# Patient Record
Sex: FEMALE | Race: WHITE | NOT HISPANIC OR LATINO | Employment: OTHER | URBAN - METROPOLITAN AREA
[De-identification: names, ages, dates, MRNs, and addresses within clinical notes are randomized per-mention and may not be internally consistent; named-entity substitution may affect disease eponyms.]

---

## 2019-12-05 ENCOUNTER — OFFICE VISIT (OUTPATIENT)
Dept: FAMILY MEDICINE | Facility: CLINIC | Age: 74
End: 2019-12-05
Payer: COMMERCIAL

## 2019-12-05 VITALS
TEMPERATURE: 98 F | SYSTOLIC BLOOD PRESSURE: 130 MMHG | OXYGEN SATURATION: 98 % | DIASTOLIC BLOOD PRESSURE: 86 MMHG | HEART RATE: 52 BPM | HEIGHT: 67 IN | WEIGHT: 147.81 LBS | BODY MASS INDEX: 23.2 KG/M2

## 2019-12-05 DIAGNOSIS — Z13.820 SCREENING FOR OSTEOPOROSIS: ICD-10-CM

## 2019-12-05 DIAGNOSIS — T84.012D FAILED TOTAL RIGHT KNEE REPLACEMENT, SUBSEQUENT ENCOUNTER: ICD-10-CM

## 2019-12-05 DIAGNOSIS — Z23 IMMUNIZATION DUE: Primary | ICD-10-CM

## 2019-12-05 DIAGNOSIS — R73.9 HYPERGLYCEMIA: ICD-10-CM

## 2019-12-05 DIAGNOSIS — I71.40 ABDOMINAL AORTIC ANEURYSM (AAA) WITHOUT RUPTURE: ICD-10-CM

## 2019-12-05 DIAGNOSIS — F02.80 DEMENTIA ASSOCIATED WITH OTHER UNDERLYING DISEASE WITHOUT BEHAVIORAL DISTURBANCE: ICD-10-CM

## 2019-12-05 DIAGNOSIS — I63.322 CEREBROVASCULAR ACCIDENT (CVA) DUE TO THROMBOSIS OF LEFT ANTERIOR CEREBRAL ARTERY: ICD-10-CM

## 2019-12-05 PROBLEM — T84.012A FAILED TOTAL RIGHT KNEE REPLACEMENT: Status: ACTIVE | Noted: 2019-12-05

## 2019-12-05 PROBLEM — I63.9 STROKE: Status: ACTIVE | Noted: 2019-12-05

## 2019-12-05 PROBLEM — F03.90 DEMENTIA: Status: ACTIVE | Noted: 2019-12-05

## 2019-12-05 PROCEDURE — 1170F FXNL STATUS ASSESSED: CPT | Mod: S$GLB,,, | Performed by: FAMILY MEDICINE

## 2019-12-05 PROCEDURE — 1126F PR PAIN SEVERITY QUANTIFIED, NO PAIN PRESENT: ICD-10-PCS | Mod: S$GLB,,, | Performed by: FAMILY MEDICINE

## 2019-12-05 PROCEDURE — 1170F PR FUNCTIONAL STATUS ASSESSED: ICD-10-PCS | Mod: S$GLB,,, | Performed by: FAMILY MEDICINE

## 2019-12-05 PROCEDURE — 1159F PR MEDICATION LIST DOCUMENTED IN MEDICAL RECORD: ICD-10-PCS | Mod: S$GLB,,, | Performed by: FAMILY MEDICINE

## 2019-12-05 PROCEDURE — 99204 PR OFFICE/OUTPT VISIT, NEW, LEVL IV, 45-59 MIN: ICD-10-PCS | Mod: S$GLB,,, | Performed by: FAMILY MEDICINE

## 2019-12-05 PROCEDURE — 1126F AMNT PAIN NOTED NONE PRSNT: CPT | Mod: S$GLB,,, | Performed by: FAMILY MEDICINE

## 2019-12-05 PROCEDURE — 1159F MED LIST DOCD IN RCRD: CPT | Mod: S$GLB,,, | Performed by: FAMILY MEDICINE

## 2019-12-05 PROCEDURE — 99204 OFFICE O/P NEW MOD 45 MIN: CPT | Mod: S$GLB,,, | Performed by: FAMILY MEDICINE

## 2019-12-05 RX ORDER — PANTOPRAZOLE SODIUM 40 MG/1
TABLET, DELAYED RELEASE ORAL
Refills: 2 | COMMUNITY
Start: 2019-11-25 | End: 2020-05-21

## 2019-12-05 RX ORDER — LOSARTAN POTASSIUM 25 MG/1
TABLET ORAL
Refills: 5 | COMMUNITY
Start: 2019-10-07 | End: 2020-04-13 | Stop reason: SDUPTHER

## 2019-12-05 RX ORDER — DONEPEZIL HYDROCHLORIDE 10 MG/1
TABLET, FILM COATED ORAL
Refills: 1 | COMMUNITY
Start: 2019-10-24 | End: 2020-09-09 | Stop reason: SDUPTHER

## 2019-12-05 RX ORDER — LAMOTRIGINE 150 MG/1
100 TABLET ORAL
Refills: 3 | COMMUNITY
Start: 2019-11-04 | End: 2020-09-25

## 2019-12-05 RX ORDER — CLOPIDOGREL BISULFATE 75 MG/1
TABLET ORAL
Refills: 2 | COMMUNITY
Start: 2019-10-28 | End: 2020-05-06 | Stop reason: SDUPTHER

## 2019-12-05 RX ORDER — LEVOTHYROXINE SODIUM 75 UG/1
TABLET ORAL
Refills: 3 | COMMUNITY
Start: 2019-11-01 | End: 2020-04-29

## 2019-12-05 RX ORDER — LOSARTAN POTASSIUM 50 MG/1
TABLET ORAL
Refills: 1 | COMMUNITY
Start: 2019-11-18 | End: 2020-05-17

## 2019-12-05 NOTE — PROGRESS NOTES
Subjective:       Patient ID: Alondra Pacheco is a 74 y.o. female.    Chief Complaint: Establish Care (patient of Dr. Mejia )      HPI    Allergies and Medications:   Review of patient's allergies indicates:  No Known Allergies  Current Outpatient Medications   Medication Sig Dispense Refill    atorvastatin (LIPITOR) 40 MG tablet TAKE 1 TABLET BY MOUTH ONCE DAILY 90 tablet 0    clopidogrel (PLAVIX) 75 mg tablet TK 1 T PO QD  2    donepezil (ARICEPT) 10 MG tablet TK 1 T PO QD  1    fenofibrate micronized (ANTARA) 130 MG capsule Take 130 mg by mouth before breakfast.      lamoTRIgine (LAMICTAL) 150 MG Tab TK 1 T PO BID  3    levothyroxine (SYNTHROID) 75 MCG tablet TK 1 T PO  D  3    losartan (COZAAR) 50 MG tablet TK 1 T PO QD  1    metoprolol tartrate (LOPRESSOR) 25 MG tablet TAKE 1 TABLET BY MOUTH TWICE DAILY 60 tablet 0    multivitamin capsule Take 1 capsule by mouth once daily.      pantoprazole (PROTONIX) 40 MG tablet TK 1 T PO  D  2    alendronate (FOSAMAX) 70 MG tablet Take 70 mg by mouth every 7 days.      amitriptyline (ELAVIL) 25 MG tablet Take 25 mg by mouth nightly as needed.      aspirin (ECOTRIN) 81 MG EC tablet Take 81 mg by mouth once daily.      co-enzyme Q-10 30 mg capsule Take 30 mg by mouth 3 (three) times daily.      fish oil-omega-3 fatty acids 300-1,000 mg capsule Take 2 g by mouth once daily.      losartan (COZAAR) 25 MG tablet TK 1 T PO QD  5    magnesium oxide (MAG-OX) 400 mg tablet Take 400 mg by mouth once daily.      metoprolol succinate (TOPROL-XL) 50 MG 24 hr tablet Take 50 mg by mouth once daily.      metoprolol tartrate (LOPRESSOR) 50 MG tablet TAKE 1 TABLET BY MOUTH TWICE DAILY (Patient not taking: Reported on 12/5/2019) 180 tablet 0    tetanus and diphther. tox, PF, (TENIVAC, PF,) 5-2 Lf unit/0.5 mL Syrg Inject 0.5 mLs into the muscle once. for 1 dose 0.5 mL 0    topiramate (TOPAMAX) 25 MG tablet TAKE 1 TABLET BY MOUTH AT BEDTIME FOR 7 DAYS THEN 1 TWICE DAILY  (Patient not taking: Reported on 2019) 180 tablet 0    varicella-zoster gE-AS01B, PF, (SHINGRIX, PF,) 50 mcg/0.5 mL injection Inject 0.5 mLs into the muscle once. for 1 dose 0.5 mL 0     No current facility-administered medications for this visit.        Family History:   Family History   Problem Relation Age of Onset    Cancer Father     Diabetes Sister     Heart attack Paternal Uncle        Social History:   Social History     Socioeconomic History    Marital status:      Spouse name: Not on file    Number of children: Not on file    Years of education: Not on file    Highest education level: Not on file   Occupational History    Not on file   Social Needs    Financial resource strain: Not on file    Food insecurity:     Worry: Not on file     Inability: Not on file    Transportation needs:     Medical: Not on file     Non-medical: Not on file   Tobacco Use    Smoking status: Former Smoker     Packs/day: 1.50     Years: 12.00     Pack years: 18.00     Last attempt to quit: 1975     Years since quittin.8    Smokeless tobacco: Never Used   Substance and Sexual Activity    Alcohol use: No    Drug use: No    Sexual activity: Not Currently   Lifestyle    Physical activity:     Days per week: Not on file     Minutes per session: Not on file    Stress: Not at all   Relationships    Social connections:     Talks on phone: Not on file     Gets together: Not on file     Attends Religion service: Not on file     Active member of club or organization: Not on file     Attends meetings of clubs or organizations: Not on file     Relationship status: Not on file   Other Topics Concern    Not on file   Social History Narrative    Not on file       Review of Systems   Respiratory:        Was a heavy smoker for 15 years(45 pack year)   Neurological:        Ocular CVA left eye in 2018, has had seizures up until February when she was found to have high blood sugar and has not had 1  since       Objective:     Vitals:    12/05/19 1414   BP: 130/86   Pulse: (!) 52   Temp: 98 °F (36.7 °C)        Physical Exam   Constitutional: She appears well-developed and well-nourished.   HENT:   Head: Normocephalic and atraumatic.   Eyes: Pupils are equal, round, and reactive to light.   Cardiovascular: Normal rate, regular rhythm, normal heart sounds and intact distal pulses. Exam reveals no gallop and no friction rub.   No murmur heard.  Pulmonary/Chest: Effort normal and breath sounds normal. No stridor. No respiratory distress. She has no wheezes. She has no rales. She exhibits no tenderness.   Psychiatric: She has a normal mood and affect. Her behavior is normal. Judgment and thought content normal.   Nursing note and vitals reviewed.      Assessment:       1. Immunization due    2. Cerebrovascular accident (CVA) due to thrombosis of left anterior cerebral artery    3. Failed total right knee replacement, subsequent encounter    4. Dementia associated with other underlying disease without behavioral disturbance    5. Abdominal aortic aneurysm (AAA) without rupture    6. Hyperglycemia    7. Screening for osteoporosis        Plan:       Alondra was seen today for establish care.    Diagnoses and all orders for this visit:    Immunization due  -     varicella-zoster gE-AS01B, PF, (SHINGRIX, PF,) 50 mcg/0.5 mL injection; Inject 0.5 mLs into the muscle once. for 1 dose  -     tetanus and diphther. tox, PF, (TENIVAC, PF,) 5-2 Lf unit/0.5 mL Syrg; Inject 0.5 mLs into the muscle once. for 1 dose    Cerebrovascular accident (CVA) due to thrombosis of left anterior cerebral artery  -     Comprehensive metabolic panel; Future    Failed total right knee replacement, subsequent encounter    Dementia associated with other underlying disease without behavioral disturbance    Abdominal aortic aneurysm (AAA) without rupture  -     US Abdominal Aorta; Future    Hyperglycemia  -     Hemoglobin A1c; Future    Screening for  osteoporosis  -     DXA Bone Density Spine And Hip         Follow up in about 3 months (around 3/5/2020).

## 2019-12-06 ENCOUNTER — TELEPHONE (OUTPATIENT)
Dept: FAMILY MEDICINE | Facility: CLINIC | Age: 74
End: 2019-12-06

## 2019-12-06 DIAGNOSIS — Z78.0 POSTMENOPAUSAL STATUS (AGE-RELATED) (NATURAL): Primary | ICD-10-CM

## 2019-12-06 NOTE — TELEPHONE ENCOUNTER
Please sign updated order for Dexa Scan with a code that is covered.  Order is pended with correct code

## 2019-12-06 NOTE — TELEPHONE ENCOUNTER
----- Message from Tammi Rodriguez sent at 12/6/2019 10:40 AM CST -----  Regarding: NON-PAYABLE DX CODE  PLEASE UPDATE DIAGNOSIS CODE ON THE DEXA ORDER.  Z13.820 ALONE IS NOT PAYABLE.    THANKS,

## 2019-12-07 ENCOUNTER — PATIENT MESSAGE (OUTPATIENT)
Dept: FAMILY MEDICINE | Facility: CLINIC | Age: 74
End: 2019-12-07

## 2019-12-15 ENCOUNTER — PATIENT MESSAGE (OUTPATIENT)
Dept: FAMILY MEDICINE | Facility: CLINIC | Age: 74
End: 2019-12-15

## 2019-12-15 DIAGNOSIS — Z23 IMMUNIZATION DUE: Primary | ICD-10-CM

## 2019-12-18 ENCOUNTER — HOSPITAL ENCOUNTER (OUTPATIENT)
Dept: RADIOLOGY | Facility: HOSPITAL | Age: 74
Discharge: HOME | End: 2019-12-18
Attending: FAMILY MEDICINE
Payer: COMMERCIAL

## 2019-12-18 DIAGNOSIS — Z78.0 POSTMENOPAUSAL STATUS (AGE-RELATED) (NATURAL): ICD-10-CM

## 2019-12-18 DIAGNOSIS — I71.40 ABDOMINAL AORTIC ANEURYSM (AAA) WITHOUT RUPTURE: ICD-10-CM

## 2019-12-18 PROCEDURE — 77080 DXA BONE DENSITY AXIAL: CPT | Mod: TC,PO

## 2019-12-18 PROCEDURE — 76775 US EXAM ABDO BACK WALL LIM: CPT | Mod: TC,PO

## 2019-12-19 ENCOUNTER — TELEPHONE (OUTPATIENT)
Dept: FAMILY MEDICINE | Facility: CLINIC | Age: 74
End: 2019-12-19

## 2019-12-19 NOTE — TELEPHONE ENCOUNTER
----- Message from Jak Park MD sent at 12/18/2019 11:32 AM CST -----  Continued osteoporosis especially at the hip.  Lumbar spine is improved continue but Fosamax weekly and calcium and vitamin-D daily, repeat scan in 2 years.

## 2020-03-12 ENCOUNTER — OFFICE VISIT (OUTPATIENT)
Dept: FAMILY MEDICINE | Facility: CLINIC | Age: 75
End: 2020-03-12
Payer: COMMERCIAL

## 2020-03-12 VITALS
DIASTOLIC BLOOD PRESSURE: 62 MMHG | HEART RATE: 60 BPM | BODY MASS INDEX: 23.23 KG/M2 | SYSTOLIC BLOOD PRESSURE: 134 MMHG | HEIGHT: 67 IN | OXYGEN SATURATION: 98 % | WEIGHT: 148 LBS

## 2020-03-12 DIAGNOSIS — E78.2 MIXED HYPERLIPIDEMIA: Primary | ICD-10-CM

## 2020-03-12 DIAGNOSIS — I63.322 CEREBROVASCULAR ACCIDENT (CVA) DUE TO THROMBOSIS OF LEFT ANTERIOR CEREBRAL ARTERY: ICD-10-CM

## 2020-03-12 DIAGNOSIS — E11.8 DIABETES MELLITUS WITH COMPLICATION IN ADULT PATIENT: ICD-10-CM

## 2020-03-12 PROCEDURE — 99213 OFFICE O/P EST LOW 20 MIN: CPT | Mod: S$GLB,,, | Performed by: FAMILY MEDICINE

## 2020-03-12 PROCEDURE — 99213 PR OFFICE/OUTPT VISIT, EST, LEVL III, 20-29 MIN: ICD-10-PCS | Mod: S$GLB,,, | Performed by: FAMILY MEDICINE

## 2020-03-12 NOTE — PROGRESS NOTES
Subjective:       Patient ID: Alondra Pacheco is a 74 y.o. female.    Chief Complaint: Results      Patient is here follow-up from last visit she had a stroke a year and half ago and was last seen in December 19th of last year.  Lab Results       Component                Value               Date                       WBC                      5.6                 02/18/2020                 HGB                      11.6 (L)            02/18/2020                 HCT                      37.1                02/18/2020                 PLT                      336                 02/18/2020                 CHOL                     150                 02/18/2020                 TRIG                     101                 02/18/2020                 HDL                      46 (L)              02/18/2020                 ALT                      16                  02/18/2020                 AST                      20                  02/18/2020                 NA                       142                 02/18/2020                 K                        4.1                 02/18/2020                 CL                       109                 02/18/2020                 CREATININE               1.31 (H)            02/18/2020                 BUN                      30 (H)              02/18/2020                 CO2                      25                  02/18/2020                 TSH                      3.93                05/02/2019            Her only residual is some some loss of peripheral vision on the left side and loss of taste on the left side    Flank Pain   This is a chronic problem. The problem occurs constantly. The pain is present in the costovertebral angle. The pain is at a severity of 4/10.       Allergies and Medications:   Review of patient's allergies indicates:  No Known Allergies  Current Outpatient Medications   Medication Sig Dispense Refill    alendronate (FOSAMAX) 70 MG tablet Take 70 mg by  mouth every 7 days.      aspirin (ECOTRIN) 81 MG EC tablet Take 81 mg by mouth once daily.      atorvastatin (LIPITOR) 80 MG tablet TAKE 1 TABLET BY MOUTH EVERY DAY 90 tablet 0    clopidogrel (PLAVIX) 75 mg tablet TK 1 T PO QD  2    co-enzyme Q-10 30 mg capsule Take 30 mg by mouth 3 (three) times daily.      donepezil (ARICEPT) 10 MG tablet TK 1 T PO QD  1    fenofibrate micronized (ANTARA) 130 MG capsule Take 130 mg by mouth before breakfast.      lamoTRIgine (LAMICTAL) 150 MG Tab TK 1 T PO BID  3    levothyroxine (SYNTHROID) 75 MCG tablet TK 1 T PO  D  3    losartan (COZAAR) 50 MG tablet TK 1 T PO QD  1    metoprolol succinate (TOPROL-XL) 50 MG 24 hr tablet Take 50 mg by mouth once daily.      multivitamin capsule Take 1 capsule by mouth once daily.      pantoprazole (PROTONIX) 40 MG tablet TK 1 T PO  D  2    amitriptyline (ELAVIL) 25 MG tablet Take 25 mg by mouth nightly as needed.      atorvastatin (LIPITOR) 40 MG tablet TAKE 1 TABLET BY MOUTH ONCE DAILY (Patient not taking: Reported on 3/12/2020) 90 tablet 0    fish oil-omega-3 fatty acids 300-1,000 mg capsule Take 2 g by mouth once daily.      losartan (COZAAR) 25 MG tablet TK 1 T PO QD  5    magnesium oxide (MAG-OX) 400 mg tablet Take 400 mg by mouth once daily.      metoprolol tartrate (LOPRESSOR) 25 MG tablet TAKE 1 TABLET BY MOUTH TWICE DAILY (Patient not taking: Reported on 3/12/2020) 60 tablet 0    metoprolol tartrate (LOPRESSOR) 50 MG tablet TAKE 1 TABLET BY MOUTH TWICE DAILY (Patient not taking: Reported on 12/5/2019) 180 tablet 0    topiramate (TOPAMAX) 25 MG tablet TAKE 1 TABLET BY MOUTH AT BEDTIME FOR 7 DAYS THEN 1 TWICE DAILY (Patient not taking: Reported on 12/5/2019) 180 tablet 0     No current facility-administered medications for this visit.        Family History:   Family History   Problem Relation Age of Onset    Cancer Father     Diabetes Sister     Heart attack Paternal Uncle        Social History:   Social History      Socioeconomic History    Marital status:      Spouse name: Not on file    Number of children: Not on file    Years of education: Not on file    Highest education level: Not on file   Occupational History    Not on file   Social Needs    Financial resource strain: Not on file    Food insecurity:     Worry: Not on file     Inability: Not on file    Transportation needs:     Medical: Not on file     Non-medical: Not on file   Tobacco Use    Smoking status: Former Smoker     Packs/day: 1.50     Years: 12.00     Pack years: 18.00     Last attempt to quit: 1975     Years since quittin.1    Smokeless tobacco: Never Used   Substance and Sexual Activity    Alcohol use: No    Drug use: No    Sexual activity: Not Currently   Lifestyle    Physical activity:     Days per week: Not on file     Minutes per session: Not on file    Stress: Not at all   Relationships    Social connections:     Talks on phone: Not on file     Gets together: Not on file     Attends [employer] service: Not on file     Active member of club or organization: Not on file     Attends meetings of clubs or organizations: Not on file     Relationship status: Not on file   Other Topics Concern    Not on file   Social History Narrative    Not on file       Review of Systems   Genitourinary: Negative for flank pain.   Neurological: Positive for seizures ( Resolved).       Objective:     Vitals:    20 1615   BP: 134/62   Pulse: 60        Physical Exam   Constitutional: She is oriented to person, place, and time. She appears well-developed and well-nourished.   HENT:   Head: Normocephalic and atraumatic.   Eyes: Pupils are equal, round, and reactive to light.   Cardiovascular: Normal rate, regular rhythm, normal heart sounds and intact distal pulses. Exam reveals no gallop and no friction rub.   No murmur heard.  Pulmonary/Chest: Effort normal and breath sounds normal. No stridor. No respiratory distress. She has no  wheezes. She has no rales. She exhibits no tenderness.   Abdominal: Soft. Bowel sounds are normal.   Neurological: She is alert and oriented to person, place, and time. She displays normal reflexes. No cranial nerve deficit or sensory deficit. She exhibits normal muscle tone. Coordination normal.   Skin: Skin is warm and dry.   Psychiatric: She has a normal mood and affect. Her behavior is normal. Judgment and thought content normal.   Nursing note and vitals reviewed.      Assessment:       1. Mixed hyperlipidemia    2. Cerebrovascular accident (CVA) due to thrombosis of left anterior cerebral artery    3. Diabetes mellitus with complication in adult patient        Plan:       Alondra was seen today for results.    Diagnoses and all orders for this visit:    Mixed hyperlipidemia    Cerebrovascular accident (CVA) due to thrombosis of left anterior cerebral artery    Diabetes mellitus with complication in adult patient  -     Hemoglobin A1c; Future         Follow up in about 3 months (around 6/12/2020).

## 2020-04-13 ENCOUNTER — PATIENT MESSAGE (OUTPATIENT)
Dept: FAMILY MEDICINE | Facility: CLINIC | Age: 75
End: 2020-04-13

## 2020-04-13 ENCOUNTER — TELEPHONE (OUTPATIENT)
Dept: FAMILY MEDICINE | Facility: CLINIC | Age: 75
End: 2020-04-13

## 2020-04-13 PROBLEM — E78.5 HYPERLIPIDEMIA: Status: ACTIVE | Noted: 2020-04-13

## 2020-04-13 PROBLEM — I10 HYPERTENSION, ESSENTIAL: Status: ACTIVE | Noted: 2020-04-13

## 2020-04-13 PROBLEM — N18.30 STAGE 3 CHRONIC KIDNEY DISEASE: Status: ACTIVE | Noted: 2020-04-13

## 2020-04-13 PROBLEM — I12.9 HYPERTENSIVE KIDNEY DISEASE: Status: ACTIVE | Noted: 2020-04-13

## 2020-04-24 ENCOUNTER — HOSPITAL ENCOUNTER (OUTPATIENT)
Dept: RADIOLOGY | Facility: HOSPITAL | Age: 75
Discharge: HOME | End: 2020-04-24
Attending: FAMILY MEDICINE
Payer: COMMERCIAL

## 2020-04-24 DIAGNOSIS — R10.32 LEFT LOWER QUADRANT PAIN: Primary | ICD-10-CM

## 2020-04-24 DIAGNOSIS — R10.32 LEFT LOWER QUADRANT PAIN: ICD-10-CM

## 2020-04-24 PROBLEM — G40.909 SEIZURE DISORDER: Status: ACTIVE | Noted: 2020-04-24

## 2020-04-24 PROCEDURE — 74176 CT ABD & PELVIS W/O CONTRAST: CPT | Mod: TC,PO

## 2020-05-06 PROBLEM — G45.9 TIA (TRANSIENT ISCHEMIC ATTACK): Status: ACTIVE | Noted: 2020-05-06

## 2020-06-11 DIAGNOSIS — I63.9 OCCIPITAL STROKE: Primary | ICD-10-CM

## 2020-06-16 DIAGNOSIS — R26.81 GAIT INSTABILITY: Primary | ICD-10-CM

## 2020-06-16 DIAGNOSIS — G43.009: ICD-10-CM

## 2020-06-17 ENCOUNTER — TELEPHONE (OUTPATIENT)
Dept: FAMILY MEDICINE | Facility: CLINIC | Age: 75
End: 2020-06-17

## 2020-06-17 DIAGNOSIS — E11.8 DIABETES MELLITUS WITH COMPLICATION IN ADULT PATIENT: Primary | ICD-10-CM

## 2020-06-17 NOTE — TELEPHONE ENCOUNTER
The patient's son called requesting a new lab order. He stated that the A1c that was ordered has . He is wanting to know if it was possible to get a new order so she can get it completed prior to her upcoming appointment. I have pended the order for approval. So please review before approving.

## 2020-06-18 ENCOUNTER — HOSPITAL ENCOUNTER (OUTPATIENT)
Dept: RADIOLOGY | Facility: HOSPITAL | Age: 75
Discharge: HOME | End: 2020-06-18
Attending: NURSE PRACTITIONER
Payer: COMMERCIAL

## 2020-06-18 DIAGNOSIS — I63.9 OCCIPITAL STROKE: ICD-10-CM

## 2020-06-18 PROCEDURE — 70551 MRI BRAIN STEM W/O DYE: CPT | Mod: TC,PO

## 2020-06-18 NOTE — TELEPHONE ENCOUNTER
Patient's son stated that they were able to get the labwork completed. He stated please ignore his phone call.

## 2020-06-19 LAB — HBA1C MFR BLD: 5.5 % OF TOTAL HGB

## 2020-06-22 ENCOUNTER — TELEPHONE (OUTPATIENT)
Dept: FAMILY MEDICINE | Facility: CLINIC | Age: 75
End: 2020-06-22

## 2020-06-22 NOTE — TELEPHONE ENCOUNTER
----- Message from KAIDEN Chen sent at 6/19/2020 12:32 PM CDT -----  Please call patient. HgbA1c looks good at 5.5. Keep appointment as scheduled.

## 2020-06-25 ENCOUNTER — OFFICE VISIT (OUTPATIENT)
Dept: FAMILY MEDICINE | Facility: CLINIC | Age: 75
End: 2020-06-25
Payer: COMMERCIAL

## 2020-06-25 VITALS
BODY MASS INDEX: 23.67 KG/M2 | TEMPERATURE: 98 F | OXYGEN SATURATION: 98 % | SYSTOLIC BLOOD PRESSURE: 130 MMHG | RESPIRATION RATE: 18 BRPM | HEIGHT: 66 IN | HEART RATE: 90 BPM | WEIGHT: 147.31 LBS | DIASTOLIC BLOOD PRESSURE: 60 MMHG

## 2020-06-25 DIAGNOSIS — N39.0 URINARY TRACT INFECTION WITH HEMATURIA, SITE UNSPECIFIED: Primary | ICD-10-CM

## 2020-06-25 DIAGNOSIS — R31.9 URINARY TRACT INFECTION WITH HEMATURIA, SITE UNSPECIFIED: Primary | ICD-10-CM

## 2020-06-25 DIAGNOSIS — B35.6 TINEA CRURIS: ICD-10-CM

## 2020-06-25 LAB
BILIRUB UR QL STRIP: NEGATIVE
GLUCOSE UR QL STRIP: NEGATIVE
KETONES UR QL STRIP: NEGATIVE
LEUKOCYTE ESTERASE UR QL STRIP: NEGATIVE
PH, POC UA: 6
POC BLOOD, URINE: NEGATIVE
POC NITRATES, URINE: NEGATIVE
PROT UR QL STRIP: NEGATIVE
SP GR UR STRIP: 1.01 (ref 1–1.03)
UROBILINOGEN UR STRIP-ACNC: NORMAL (ref 0.1–1.1)

## 2020-06-25 PROCEDURE — 99214 OFFICE O/P EST MOD 30 MIN: CPT | Mod: 25,S$GLB,, | Performed by: FAMILY MEDICINE

## 2020-06-25 PROCEDURE — 99214 PR OFFICE/OUTPT VISIT, EST, LEVL IV, 30-39 MIN: ICD-10-PCS | Mod: 25,S$GLB,, | Performed by: FAMILY MEDICINE

## 2020-06-25 PROCEDURE — 81003 URINALYSIS AUTO W/O SCOPE: CPT | Mod: QW,S$GLB,, | Performed by: FAMILY MEDICINE

## 2020-06-25 PROCEDURE — 81003 POCT URINALYSIS, DIPSTICK, AUTOMATED, W/O SCOPE: ICD-10-PCS | Mod: QW,S$GLB,, | Performed by: FAMILY MEDICINE

## 2020-06-25 RX ORDER — NYSTATIN AND TRIAMCINOLONE ACETONIDE 100000; 1 [USP'U]/G; MG/G
OINTMENT TOPICAL 2 TIMES DAILY
Qty: 60 G | Refills: 1 | Status: SHIPPED | OUTPATIENT
Start: 2020-06-25 | End: 2020-07-01 | Stop reason: SDUPTHER

## 2020-06-25 NOTE — PROGRESS NOTES
Subjective:       Patient ID: Alondra Pacheco is a 74 y.o. female.    Chief Complaint: Hyperlipidemia (follow up labs)      Patient is here for follow-up.  She had seen Dr. Mejia in May of this year with UTI.  She previously had suprapubic pain but no dysuria or odor she says this has improved.  Lab Results       Component                Value               Date                       WBC                      7.0                 06/18/2020                 HGB                      11.8                06/18/2020                 HCT                      36.2                06/18/2020                 PLT                      291                 06/18/2020                 CHOL                     150                 02/18/2020                 TRIG                     101                 02/18/2020                 HDL                      46 (L)              02/18/2020                 ALT                      13                  06/18/2020                 AST                      20                  06/18/2020                 NA                       141                 06/18/2020                 K                        4.0                 06/18/2020                 CL                       109                 06/18/2020                 CREATININE               1.15 (H)            06/18/2020                 BUN                      23                  06/18/2020                 CO2                      23                  06/18/2020                 TSH                      3.93                05/02/2019                 HGBA1C                   5.5                 06/18/2020            Lab Results       Component                Value               Date                       CHOL                     150                 02/18/2020                 CHOL                     163                 07/15/2019            Lab Results       Component                Value               Date                       HDL                       46 (L)              02/18/2020                 HDL                      50 (L)              07/15/2019            Lab Results       Component                Value               Date                       LDLCALC                  85                  02/18/2020                 LDLCALC                  92                  07/15/2019            Lab Results       Component                Value               Date                       TRIG                     101                 02/18/2020                 TRIG                     112                 07/15/2019            Lab Results       Component                Value               Date                       CHOLHDL                  3.3                 02/18/2020                 CHOLHDL                  3.3                 07/15/2019                Hyperlipidemia  This is a chronic problem. The problem is controlled. Recent lipid tests were reviewed and are normal. Pertinent negatives include no chest pain or focal sensory loss.       Allergies and Medications:   Review of patient's allergies indicates:   Allergen Reactions    Anesthesia s/i-40 (propofol) [propofol]      Current Outpatient Medications   Medication Sig Dispense Refill    alendronate (FOSAMAX) 70 MG tablet Take 70 mg by mouth every 7 days.      atorvastatin (LIPITOR) 80 MG tablet TAKE 1 TABLET BY MOUTH EVERY DAY 90 tablet 0    clopidogreL (PLAVIX) 75 mg tablet TK 1 T PO QD 90 tablet 1    donepezil (ARICEPT) 10 MG tablet TK 1 T PO QD  1    fenofibrate 160 MG Tab TAKE 1 TABLET BY MOUTH EVERY DAY 90 tablet 1    lamoTRIgine (LAMICTAL) 150 MG Tab 100 mg.   3    levothyroxine (SYNTHROID) 75 MCG tablet TAKE 1 TABLET BY MOUTH DAILY 90 tablet 1    losartan (COZAAR) 50 MG tablet TAKE 1 TABLET BY MOUTH EVERY DAY 90 tablet 0    metoprolol tartrate (LOPRESSOR) 25 MG tablet TAKE 1 TABLET BY MOUTH TWICE DAILY 180 tablet 0    multivitamin capsule Take 1 capsule by mouth once daily.      pantoprazole  (PROTONIX) 40 MG tablet TAKE 1 TABLET BY MOUTH DAILY 90 tablet 1    topiramate (TOPAMAX) 50 MG tablet Take 1 tablet (50 mg total) by mouth 2 (two) times daily. 180 tablet 1    cefUROXime (CEFTIN) 500 MG tablet Take 1 tablet (500 mg total) by mouth 2 (two) times daily. (Patient not taking: Reported on 2020) 20 tablet 0    ciprofloxacin HCl (CIPRO) 500 MG tablet Take 1 tablet (500 mg total) by mouth 2 (two) times daily. (Patient not taking: Reported on 2020) 10 tablet 0    metroNIDAZOLE (FLAGYL) 250 MG tablet Take 1 tablet (250 mg total) by mouth 3 (three) times daily. (Patient not taking: Reported on 2020) 15 tablet 0    nystatin-triamcinolone (MYCOLOG) ointment Apply topically 2 (two) times daily. for 10 days 60 g 1     No current facility-administered medications for this visit.        Family History:   Family History   Problem Relation Age of Onset    Cancer Father     Diabetes Sister     Heart attack Paternal Uncle        Social History:   Social History     Socioeconomic History    Marital status:      Spouse name: Not on file    Number of children: Not on file    Years of education: Not on file    Highest education level: Not on file   Occupational History    Not on file   Social Needs    Financial resource strain: Not on file    Food insecurity     Worry: Not on file     Inability: Not on file    Transportation needs     Medical: Not on file     Non-medical: Not on file   Tobacco Use    Smoking status: Former Smoker     Packs/day: 1.50     Years: 12.00     Pack years: 18.00     Quit date: 1975     Years since quittin.4    Smokeless tobacco: Never Used   Substance and Sexual Activity    Alcohol use: No    Drug use: No    Sexual activity: Not Currently   Lifestyle    Physical activity     Days per week: Not on file     Minutes per session: Not on file    Stress: Not at all   Relationships    Social connections     Talks on phone: Not on file     Gets  together: Not on file     Attends Sabianist service: Not on file     Active member of club or organization: Not on file     Attends meetings of clubs or organizations: Not on file     Relationship status: Not on file   Other Topics Concern    Not on file   Social History Narrative    Not on file       Review of Systems   Cardiovascular: Negative for chest pain.       Objective:     Vitals:    06/25/20 1530   BP: 130/60   Pulse: 90   Resp: 18   Temp: 97.5 °F (36.4 °C)        Physical Exam  Vitals signs and nursing note reviewed.   Constitutional:       Appearance: She is well-developed and normal weight.   HENT:      Head: Normocephalic and atraumatic.   Eyes:      Pupils: Pupils are equal, round, and reactive to light.   Cardiovascular:      Rate and Rhythm: Normal rate and regular rhythm.      Heart sounds: Normal heart sounds. No murmur. No friction rub. No gallop.    Pulmonary:      Effort: Pulmonary effort is normal. No respiratory distress.      Breath sounds: Normal breath sounds. No stridor. No wheezing or rales.   Chest:      Chest wall: No tenderness.   Genitourinary:      Neurological:      Mental Status: She is alert.   Psychiatric:         Behavior: Behavior normal.         Thought Content: Thought content normal.         Judgment: Judgment normal.       urinalysis was clear negative ketones leukocytes nitrites and blood.  Assessment:       1. Urinary tract infection with hematuria, site unspecified - resolved   2. Tinea cruris        Plan:       Alondra was seen today for hyperlipidemia.    Diagnoses and all orders for this visit:    Urinary tract infection with hematuria, site unspecified  -     POCT URINALYSIS  -     POCT Urinalysis, Dipstick, Automated, W/O Scope    Tinea cruris  -     nystatin-triamcinolone (MYCOLOG) ointment; Apply topically 2 (two) times daily. for 10 days         Follow up in about 3 months (around 9/25/2020) for follow up HTN, follow up stroke, annual.

## 2020-07-01 DIAGNOSIS — B35.6 TINEA CRURIS: ICD-10-CM

## 2020-07-01 RX ORDER — NYSTATIN AND TRIAMCINOLONE ACETONIDE 100000; 1 [USP'U]/G; MG/G
OINTMENT TOPICAL 2 TIMES DAILY
Qty: 60 G | Refills: 0 | Status: SHIPPED | OUTPATIENT
Start: 2020-07-01 | End: 2020-07-02 | Stop reason: SDUPTHER

## 2020-07-02 DIAGNOSIS — B35.6 TINEA CRURIS: ICD-10-CM

## 2020-07-02 RX ORDER — NYSTATIN AND TRIAMCINOLONE ACETONIDE 100000; 1 [USP'U]/G; MG/G
OINTMENT TOPICAL 2 TIMES DAILY
Qty: 60 G | Refills: 0 | Status: SHIPPED | OUTPATIENT
Start: 2020-07-02 | End: 2020-09-25

## 2020-07-09 DIAGNOSIS — I63.9 IMPENDING CEREBROVASCULAR ACCIDENT: ICD-10-CM

## 2020-07-09 DIAGNOSIS — G43.009 COMMON MIGRAINE: ICD-10-CM

## 2020-07-09 DIAGNOSIS — R42 DIZZINESS: ICD-10-CM

## 2020-07-09 DIAGNOSIS — R26.81 UNSTEADY: Primary | ICD-10-CM

## 2020-07-09 DIAGNOSIS — R56.9 SEIZURE: Primary | ICD-10-CM

## 2020-07-09 DIAGNOSIS — G43.009: ICD-10-CM

## 2020-07-09 DIAGNOSIS — I63.9 PROGRESSING STROKE: ICD-10-CM

## 2020-09-03 RX ORDER — DONEPEZIL HYDROCHLORIDE 10 MG/1
TABLET, FILM COATED ORAL
Qty: 90 TABLET | Refills: 0 | OUTPATIENT
Start: 2020-09-03

## 2020-09-03 NOTE — TELEPHONE ENCOUNTER
----- Message from Gómez Gastelum sent at 9/3/2020 11:10 AM CDT -----  Regarding: med refill  ?denepozil? 10MG     Now uses [facility] in [place] [address]

## 2020-09-09 RX ORDER — DONEPEZIL HYDROCHLORIDE 10 MG/1
TABLET, FILM COATED ORAL
Qty: 30 TABLET | Refills: 1 | Status: SHIPPED | OUTPATIENT
Start: 2020-09-09 | End: 2020-09-09

## 2020-09-17 DIAGNOSIS — I63.9 OCCIPITAL STROKE: ICD-10-CM

## 2020-09-17 DIAGNOSIS — I63.9 IMPENDING CEREBROVASCULAR ACCIDENT: Primary | ICD-10-CM

## 2020-09-17 DIAGNOSIS — G43.009 MIGRAINE WITHOUT AURA AND WITHOUT STATUS MIGRAINOSUS, NOT INTRACTABLE: ICD-10-CM

## 2020-09-17 DIAGNOSIS — I63.89 ACUTE ARTERIAL ISCHEMIC STROKE, MULTIFOCAL, MULT VASCULAR TERRITORIES: Primary | ICD-10-CM

## 2020-09-17 DIAGNOSIS — R56.9 SEIZURE: ICD-10-CM

## 2020-09-17 DIAGNOSIS — R42 DIZZINESS: ICD-10-CM

## 2020-09-17 DIAGNOSIS — I63.9 STROKE: ICD-10-CM

## 2020-09-25 ENCOUNTER — OFFICE VISIT (OUTPATIENT)
Dept: FAMILY MEDICINE | Facility: CLINIC | Age: 75
End: 2020-09-25
Payer: COMMERCIAL

## 2020-09-25 VITALS
HEART RATE: 60 BPM | TEMPERATURE: 56 F | OXYGEN SATURATION: 98 % | BODY MASS INDEX: 24.11 KG/M2 | DIASTOLIC BLOOD PRESSURE: 90 MMHG | SYSTOLIC BLOOD PRESSURE: 158 MMHG | HEIGHT: 66 IN | WEIGHT: 150 LBS

## 2020-09-25 DIAGNOSIS — I12.9 HYPERTENSIVE KIDNEY DISEASE: ICD-10-CM

## 2020-09-25 DIAGNOSIS — Z79.899 ENCOUNTER FOR LONG-TERM (CURRENT) USE OF OTHER MEDICATIONS: ICD-10-CM

## 2020-09-25 DIAGNOSIS — N18.30 STAGE 3 CHRONIC KIDNEY DISEASE: ICD-10-CM

## 2020-09-25 DIAGNOSIS — E78.5 HYPERLIPIDEMIA, UNSPECIFIED HYPERLIPIDEMIA TYPE: ICD-10-CM

## 2020-09-25 DIAGNOSIS — E53.8 VITAMIN B12 DEFICIENCY: ICD-10-CM

## 2020-09-25 DIAGNOSIS — I63.322 CEREBROVASCULAR ACCIDENT (CVA) DUE TO THROMBOSIS OF LEFT ANTERIOR CEREBRAL ARTERY: ICD-10-CM

## 2020-09-25 DIAGNOSIS — G40.909 SEIZURE DISORDER: ICD-10-CM

## 2020-09-25 DIAGNOSIS — G44.1 OTHER VASCULAR HEADACHE: ICD-10-CM

## 2020-09-25 DIAGNOSIS — G45.9 TIA (TRANSIENT ISCHEMIC ATTACK): ICD-10-CM

## 2020-09-25 DIAGNOSIS — F02.80 DEMENTIA ASSOCIATED WITH OTHER UNDERLYING DISEASE WITHOUT BEHAVIORAL DISTURBANCE: ICD-10-CM

## 2020-09-25 DIAGNOSIS — I10 HYPERTENSION, ESSENTIAL: Primary | ICD-10-CM

## 2020-09-25 PROCEDURE — 99214 PR OFFICE/OUTPT VISIT, EST, LEVL IV, 30-39 MIN: ICD-10-PCS | Mod: 25,S$GLB,, | Performed by: FAMILY MEDICINE

## 2020-09-25 PROCEDURE — 99214 OFFICE O/P EST MOD 30 MIN: CPT | Mod: 25,S$GLB,, | Performed by: FAMILY MEDICINE

## 2020-09-25 PROCEDURE — G0008 ADMIN INFLUENZA VIRUS VAC: HCPCS | Mod: S$GLB,,, | Performed by: FAMILY MEDICINE

## 2020-09-25 PROCEDURE — 90694 FLU VACCINE - QUADRIVALENT - ADJUVANTED: ICD-10-PCS | Mod: S$GLB,,, | Performed by: FAMILY MEDICINE

## 2020-09-25 PROCEDURE — 90694 VACC AIIV4 NO PRSRV 0.5ML IM: CPT | Mod: S$GLB,,, | Performed by: FAMILY MEDICINE

## 2020-09-25 PROCEDURE — G0008 PR ADMIN INFLUENZA VIRUS VAC: ICD-10-PCS | Mod: S$GLB,,, | Performed by: FAMILY MEDICINE

## 2020-09-25 RX ORDER — PANTOPRAZOLE SODIUM 40 MG/1
40 TABLET, DELAYED RELEASE ORAL DAILY
Qty: 90 TABLET | Refills: 1 | Status: SHIPPED | OUTPATIENT
Start: 2020-09-25 | End: 2021-03-01 | Stop reason: SDUPTHER

## 2020-09-25 RX ORDER — ATORVASTATIN CALCIUM 80 MG/1
80 TABLET, FILM COATED ORAL DAILY
Qty: 90 TABLET | Refills: 1 | Status: SHIPPED | OUTPATIENT
Start: 2020-09-25 | End: 2021-04-09 | Stop reason: SDUPTHER

## 2020-09-25 RX ORDER — DONEPEZIL HYDROCHLORIDE 10 MG/1
TABLET, FILM COATED ORAL
Qty: 90 TABLET | Refills: 1 | Status: SHIPPED | OUTPATIENT
Start: 2020-09-25 | End: 2021-03-11 | Stop reason: SDUPTHER

## 2020-09-25 RX ORDER — LOSARTAN POTASSIUM 100 MG/1
50 TABLET ORAL DAILY
Qty: 90 TABLET | Refills: 1 | Status: SHIPPED | OUTPATIENT
Start: 2020-09-25 | End: 2021-02-04 | Stop reason: SDUPTHER

## 2020-09-25 RX ORDER — METOPROLOL TARTRATE 25 MG/1
25 TABLET, FILM COATED ORAL 2 TIMES DAILY
Qty: 180 TABLET | Refills: 1 | Status: SHIPPED | OUTPATIENT
Start: 2020-09-25 | End: 2021-02-22 | Stop reason: SDUPTHER

## 2020-09-25 RX ORDER — LAMOTRIGINE 100 MG/1
100 TABLET ORAL 2 TIMES DAILY
COMMUNITY
Start: 2020-09-09

## 2020-09-25 RX ORDER — CLOPIDOGREL BISULFATE 75 MG/1
TABLET ORAL
Qty: 90 TABLET | Refills: 1 | Status: SHIPPED | OUTPATIENT
Start: 2020-09-25 | End: 2021-02-04 | Stop reason: SDUPTHER

## 2020-09-25 RX ORDER — CALCIUM CARBONATE 600 MG
600 TABLET ORAL NIGHTLY
COMMUNITY
End: 2023-07-28

## 2020-09-25 NOTE — PROGRESS NOTES
Here for her regular follow-up.  She has been seeing Dr. aguilar for also.  She needs medication refills today.  Needs her high-dose flu shot.  Unfortunately we are not going to be on her insurance and she will have to either switching insurances or switch doctors she plans to switch doctors and see Dr. Lockett.  Continues to have left eye pain frontal and behind the eye ever since her stroke.  Stroke was right occipital.  She has had no falls since she is using her cane.  Her urologic problem cleared up with some over-the-counter cortisone 10.  Pain behind and around the eyes is not daily.  But frequent she mentions it to the son least a couple of times a month.  No scotoma.  Pain increases and decreases.  Uses Tylenol.  Her Lamictal has been decreased.  She is on the Topamax still.  Says it hurts sometimes to look down.  In light does bother her eyes.  Saw an unknown eye doctor no problem the itself.  Reviewed her old MRI of the brain.  She is to have a carotid ultrasound this was ordered by.  Her GFR is 46 blood pressure is elevated no home blood pressure checks.  Will increase her blood pressure medicine.  Memory is very poor.    Physical examination elderly female.  No acute distress.  Alert and oriented.  Pupils are equal round regular.  Extraocular muscles are intact possibly some pain leftward gaze.  There is no temporal tenderness.  Neck is supple there is no bruit no adenopathy no thyromegaly.  Chest clear to auscultation wheezes or crackles no dyspnea.  Heart regular rate rhythm murmur gallop or rub.  Extremities are without edema positive pulses.    Impression left-sided headache.  Prior right occipital CVA.  Dementia.  Hypertension.  Hyperlipidemia.  History of B12 deficiency.    Plan will increase her losartan to 100 mg daily 90 with a refill.  Flu shot administered in the left arm me.  Possibly increase the Topamax labs are.  Ordered my labs labs that neurology would like often [facility].  Sed rate CRP CMP  lipids B12 folate B1 RPR ammonia level Lamictal level.  Follow-up with new PMD regarding hypertension.  blood pressure check here in the next week or so.

## 2020-09-28 ENCOUNTER — HOSPITAL ENCOUNTER (OUTPATIENT)
Dept: RADIOLOGY | Facility: HOSPITAL | Age: 75
Discharge: HOME | End: 2020-09-28
Attending: NURSE PRACTITIONER
Payer: COMMERCIAL

## 2020-09-28 DIAGNOSIS — G43.009 MIGRAINE WITHOUT AURA AND WITHOUT STATUS MIGRAINOSUS, NOT INTRACTABLE: ICD-10-CM

## 2020-09-28 DIAGNOSIS — I63.9 STROKE: ICD-10-CM

## 2020-09-28 DIAGNOSIS — R42 DIZZINESS: ICD-10-CM

## 2020-09-28 DIAGNOSIS — I63.89 ACUTE ARTERIAL ISCHEMIC STROKE, MULTIFOCAL, MULT VASCULAR TERRITORIES: ICD-10-CM

## 2020-09-28 DIAGNOSIS — R56.9 SEIZURE: ICD-10-CM

## 2020-09-28 PROCEDURE — 93880 EXTRACRANIAL BILAT STUDY: CPT | Mod: TC,PO

## 2020-10-01 LAB
ALBUMIN SERPL-MCNC: 4.2 G/DL (ref 3.6–5.1)
ALBUMIN/GLOB SERPL: 1.4 (CALC) (ref 1–2.5)
ALP SERPL-CCNC: 26 U/L (ref 37–153)
ALT SERPL-CCNC: 13 U/L (ref 6–29)
AST SERPL-CCNC: 18 U/L (ref 10–35)
BILIRUB SERPL-MCNC: 0.4 MG/DL (ref 0.2–1.2)
BUN SERPL-MCNC: 27 MG/DL (ref 7–25)
BUN/CREAT SERPL: 18 (CALC) (ref 6–22)
CALCIUM SERPL-MCNC: 9.9 MG/DL (ref 8.6–10.4)
CHLORIDE SERPL-SCNC: 110 MMOL/L (ref 98–110)
CHOLEST SERPL-MCNC: 175 MG/DL
CHOLEST/HDLC SERPL: 3.2 (CALC)
CO2 SERPL-SCNC: 26 MMOL/L (ref 20–32)
CREAT SERPL-MCNC: 1.5 MG/DL (ref 0.6–0.93)
CRP SERPL-MCNC: 0.6 MG/L
ERYTHROCYTE [SEDIMENTATION RATE] IN BLOOD BY WESTERGREN METHOD: 2 MM/H
FOLATE SERPL-MCNC: 18.7 NG/ML
GFRSERPLBLD MDRD-ARVRAT: 33 ML/MIN/1.73M2
GLOBULIN SER CALC-MCNC: 2.9 G/DL (CALC) (ref 1.9–3.7)
GLUCOSE SERPL-MCNC: 103 MG/DL (ref 65–99)
HDLC SERPL-MCNC: 54 MG/DL
LDLC SERPL CALC-MCNC: 100 MG/DL (CALC)
NONHDLC SERPL-MCNC: 121 MG/DL (CALC)
POTASSIUM SERPL-SCNC: 4.3 MMOL/L (ref 3.5–5.3)
PROT SERPL-MCNC: 7.1 G/DL (ref 6.1–8.1)
SODIUM SERPL-SCNC: 143 MMOL/L (ref 135–146)
TRIGL SERPL-MCNC: 114 MG/DL
VIT B12 SERPL-MCNC: 516 PG/ML (ref 200–1100)

## 2021-01-08 ENCOUNTER — OFFICE VISIT (OUTPATIENT)
Dept: FAMILY MEDICINE | Facility: CLINIC | Age: 76
End: 2021-01-08
Payer: COMMERCIAL

## 2021-01-08 VITALS
SYSTOLIC BLOOD PRESSURE: 130 MMHG | OXYGEN SATURATION: 98 % | WEIGHT: 150 LBS | HEIGHT: 66 IN | HEART RATE: 60 BPM | BODY MASS INDEX: 24.11 KG/M2 | DIASTOLIC BLOOD PRESSURE: 80 MMHG

## 2021-01-08 DIAGNOSIS — N18.32 ANEMIA DUE TO STAGE 3B CHRONIC KIDNEY DISEASE: ICD-10-CM

## 2021-01-08 DIAGNOSIS — I12.9 HYPERTENSIVE KIDNEY DISEASE: ICD-10-CM

## 2021-01-08 DIAGNOSIS — F02.80 DEMENTIA ASSOCIATED WITH OTHER UNDERLYING DISEASE WITHOUT BEHAVIORAL DISTURBANCE: ICD-10-CM

## 2021-01-08 DIAGNOSIS — D63.1 ANEMIA DUE TO STAGE 3B CHRONIC KIDNEY DISEASE: ICD-10-CM

## 2021-01-08 DIAGNOSIS — Z00.00 PREVENTATIVE HEALTH CARE: ICD-10-CM

## 2021-01-08 DIAGNOSIS — N18.31 STAGE 3A CHRONIC KIDNEY DISEASE: Primary | ICD-10-CM

## 2021-01-08 PROCEDURE — 99214 OFFICE O/P EST MOD 30 MIN: CPT | Mod: S$GLB,,, | Performed by: FAMILY MEDICINE

## 2021-01-08 PROCEDURE — 99214 PR OFFICE/OUTPT VISIT, EST, LEVL IV, 30-39 MIN: ICD-10-PCS | Mod: S$GLB,,, | Performed by: FAMILY MEDICINE

## 2021-01-15 ENCOUNTER — LAB VISIT (OUTPATIENT)
Dept: LAB | Facility: HOSPITAL | Age: 76
End: 2021-01-15
Attending: FAMILY MEDICINE
Payer: COMMERCIAL

## 2021-01-15 ENCOUNTER — TELEPHONE (OUTPATIENT)
Dept: FAMILY MEDICINE | Facility: CLINIC | Age: 76
End: 2021-01-15

## 2021-01-15 DIAGNOSIS — F02.80 DEMENTIA ASSOCIATED WITH OTHER UNDERLYING DISEASE WITHOUT BEHAVIORAL DISTURBANCE: ICD-10-CM

## 2021-01-15 DIAGNOSIS — N18.32 ANEMIA DUE TO STAGE 3B CHRONIC KIDNEY DISEASE: ICD-10-CM

## 2021-01-15 DIAGNOSIS — I12.9 HYPERTENSIVE KIDNEY DISEASE: ICD-10-CM

## 2021-01-15 DIAGNOSIS — D63.1 ANEMIA DUE TO STAGE 3B CHRONIC KIDNEY DISEASE: ICD-10-CM

## 2021-01-15 DIAGNOSIS — Z00.00 PREVENTATIVE HEALTH CARE: ICD-10-CM

## 2021-01-15 LAB
ALBUMIN SERPL BCP-MCNC: 4.1 G/DL (ref 3.5–5.2)
ALP SERPL-CCNC: 26 U/L (ref 55–135)
ALT SERPL W/O P-5'-P-CCNC: 18 U/L (ref 10–44)
ANION GAP SERPL CALC-SCNC: 7 MMOL/L (ref 8–16)
AST SERPL-CCNC: 24 U/L (ref 10–40)
BASOPHILS # BLD AUTO: 0.07 K/UL (ref 0–0.2)
BASOPHILS NFR BLD: 1.1 % (ref 0–1.9)
BILIRUB SERPL-MCNC: 1.3 MG/DL (ref 0.1–1)
BUN SERPL-MCNC: 23 MG/DL (ref 8–23)
CALCIUM SERPL-MCNC: 9.4 MG/DL (ref 8.7–10.5)
CHLORIDE SERPL-SCNC: 112 MMOL/L (ref 95–110)
CO2 SERPL-SCNC: 24 MMOL/L (ref 23–29)
CREAT SERPL-MCNC: 1.3 MG/DL (ref 0.5–1.4)
DIFFERENTIAL METHOD: ABNORMAL
EOSINOPHIL # BLD AUTO: 0.2 K/UL (ref 0–0.5)
EOSINOPHIL NFR BLD: 2.5 % (ref 0–8)
ERYTHROCYTE [DISTWIDTH] IN BLOOD BY AUTOMATED COUNT: 14.3 % (ref 11.5–14.5)
EST. GFR  (AFRICAN AMERICAN): 45.7 ML/MIN/1.73 M^2
EST. GFR  (NON AFRICAN AMERICAN): 39.7 ML/MIN/1.73 M^2
GLUCOSE SERPL-MCNC: 97 MG/DL (ref 70–110)
HCT VFR BLD AUTO: 39.4 % (ref 37–48.5)
HGB BLD-MCNC: 12.2 G/DL (ref 12–16)
IMM GRANULOCYTES # BLD AUTO: 0.03 K/UL (ref 0–0.04)
IMM GRANULOCYTES NFR BLD AUTO: 0.5 % (ref 0–0.5)
LYMPHOCYTES # BLD AUTO: 2 K/UL (ref 1–4.8)
LYMPHOCYTES NFR BLD: 30.8 % (ref 18–48)
MCH RBC QN AUTO: 28.1 PG (ref 27–31)
MCHC RBC AUTO-ENTMCNC: 31 G/DL (ref 32–36)
MCV RBC AUTO: 91 FL (ref 82–98)
MONOCYTES # BLD AUTO: 0.4 K/UL (ref 0.3–1)
MONOCYTES NFR BLD: 6.7 % (ref 4–15)
NEUTROPHILS # BLD AUTO: 3.7 K/UL (ref 1.8–7.7)
NEUTROPHILS NFR BLD: 58.4 % (ref 38–73)
NRBC BLD-RTO: 0 /100 WBC
PLATELET # BLD AUTO: 279 K/UL (ref 150–350)
PMV BLD AUTO: 10.8 FL (ref 9.2–12.9)
POTASSIUM SERPL-SCNC: 3.9 MMOL/L (ref 3.5–5.1)
PROT SERPL-MCNC: 7.6 G/DL (ref 6–8.4)
RBC # BLD AUTO: 4.34 M/UL (ref 4–5.4)
SODIUM SERPL-SCNC: 143 MMOL/L (ref 136–145)
WBC # BLD AUTO: 6.39 K/UL (ref 3.9–12.7)

## 2021-01-15 PROCEDURE — 85025 COMPLETE CBC W/AUTO DIFF WBC: CPT

## 2021-01-15 PROCEDURE — 36415 COLL VENOUS BLD VENIPUNCTURE: CPT

## 2021-01-15 PROCEDURE — 86803 HEPATITIS C AB TEST: CPT

## 2021-01-15 PROCEDURE — 80053 COMPREHEN METABOLIC PANEL: CPT

## 2021-01-16 LAB — HCV AB S/CO SERPL IA: <0.1 S/CO RATIO (ref 0–0.9)

## 2021-01-20 ENCOUNTER — TELEPHONE (OUTPATIENT)
Dept: FAMILY MEDICINE | Facility: CLINIC | Age: 76
End: 2021-01-20

## 2021-01-21 ENCOUNTER — PATIENT MESSAGE (OUTPATIENT)
Dept: FAMILY MEDICINE | Facility: CLINIC | Age: 76
End: 2021-01-21

## 2021-01-22 ENCOUNTER — PATIENT MESSAGE (OUTPATIENT)
Dept: FAMILY MEDICINE | Facility: CLINIC | Age: 76
End: 2021-01-22

## 2021-01-22 RX ORDER — TOPIRAMATE 50 MG/1
50 TABLET, FILM COATED ORAL 2 TIMES DAILY
Qty: 180 TABLET | Refills: 1
Start: 2021-01-22 | End: 2021-01-26 | Stop reason: SDUPTHER

## 2021-01-25 ENCOUNTER — PATIENT MESSAGE (OUTPATIENT)
Dept: FAMILY MEDICINE | Facility: CLINIC | Age: 76
End: 2021-01-25

## 2021-01-26 RX ORDER — TOPIRAMATE 50 MG/1
50 TABLET, FILM COATED ORAL 2 TIMES DAILY
Qty: 180 TABLET | Refills: 1 | Status: SHIPPED | OUTPATIENT
Start: 2021-01-26 | End: 2022-07-20

## 2021-02-03 ENCOUNTER — PATIENT MESSAGE (OUTPATIENT)
Dept: FAMILY MEDICINE | Facility: CLINIC | Age: 76
End: 2021-02-03

## 2021-02-04 RX ORDER — LOSARTAN POTASSIUM 50 MG/1
50 TABLET ORAL DAILY
Qty: 90 TABLET | Refills: 1 | Status: SHIPPED | OUTPATIENT
Start: 2021-02-04 | End: 2021-07-29

## 2021-02-04 RX ORDER — CLOPIDOGREL BISULFATE 75 MG/1
TABLET ORAL
Qty: 90 TABLET | Refills: 1 | Status: SHIPPED | OUTPATIENT
Start: 2021-02-04 | End: 2021-07-29

## 2021-02-09 ENCOUNTER — PATIENT MESSAGE (OUTPATIENT)
Dept: FAMILY MEDICINE | Facility: CLINIC | Age: 76
End: 2021-02-09

## 2021-02-10 RX ORDER — LEVOTHYROXINE SODIUM 75 UG/1
75 TABLET ORAL DAILY
Qty: 90 TABLET | Refills: 1 | Status: SHIPPED | OUTPATIENT
Start: 2021-02-10 | End: 2021-07-29

## 2021-02-21 ENCOUNTER — PATIENT MESSAGE (OUTPATIENT)
Dept: FAMILY MEDICINE | Facility: CLINIC | Age: 76
End: 2021-02-21

## 2021-02-22 RX ORDER — METOPROLOL TARTRATE 25 MG/1
25 TABLET, FILM COATED ORAL 2 TIMES DAILY
Qty: 180 TABLET | Refills: 1 | Status: SHIPPED | OUTPATIENT
Start: 2021-02-22

## 2021-02-28 ENCOUNTER — PATIENT MESSAGE (OUTPATIENT)
Dept: FAMILY MEDICINE | Facility: CLINIC | Age: 76
End: 2021-02-28

## 2021-02-28 DIAGNOSIS — K21.9 GASTROESOPHAGEAL REFLUX DISEASE, UNSPECIFIED WHETHER ESOPHAGITIS PRESENT: Primary | ICD-10-CM

## 2021-03-01 RX ORDER — PANTOPRAZOLE SODIUM 40 MG/1
40 TABLET, DELAYED RELEASE ORAL DAILY
Qty: 90 TABLET | Refills: 1 | Status: SHIPPED | OUTPATIENT
Start: 2021-03-01

## 2021-03-10 ENCOUNTER — PATIENT MESSAGE (OUTPATIENT)
Dept: FAMILY MEDICINE | Facility: CLINIC | Age: 76
End: 2021-03-10

## 2021-03-10 DIAGNOSIS — F02.80 DEMENTIA ASSOCIATED WITH OTHER UNDERLYING DISEASE WITHOUT BEHAVIORAL DISTURBANCE: Primary | ICD-10-CM

## 2021-03-10 DIAGNOSIS — M81.0 OSTEOPOROSIS, UNSPECIFIED OSTEOPOROSIS TYPE, UNSPECIFIED PATHOLOGICAL FRACTURE PRESENCE: ICD-10-CM

## 2021-03-11 RX ORDER — DONEPEZIL HYDROCHLORIDE 10 MG/1
TABLET, FILM COATED ORAL
Qty: 90 TABLET | Refills: 1 | Status: SHIPPED | OUTPATIENT
Start: 2021-03-11 | End: 2021-09-15

## 2021-03-11 RX ORDER — ALENDRONATE SODIUM 70 MG/1
70 TABLET ORAL
Qty: 12 TABLET | Refills: 1 | Status: SHIPPED | OUTPATIENT
Start: 2021-03-11 | End: 2021-10-04

## 2021-03-24 ENCOUNTER — PATIENT MESSAGE (OUTPATIENT)
Dept: FAMILY MEDICINE | Facility: CLINIC | Age: 76
End: 2021-03-24

## 2021-03-24 DIAGNOSIS — E78.5 HYPERLIPIDEMIA, UNSPECIFIED HYPERLIPIDEMIA TYPE: Primary | ICD-10-CM

## 2021-03-25 RX ORDER — FENOFIBRATE 160 MG/1
160 TABLET ORAL DAILY
Qty: 90 TABLET | Refills: 0 | OUTPATIENT
Start: 2021-03-25

## 2021-03-28 ENCOUNTER — PATIENT MESSAGE (OUTPATIENT)
Dept: FAMILY MEDICINE | Facility: CLINIC | Age: 76
End: 2021-03-28

## 2021-03-28 DIAGNOSIS — E78.5 HYPERLIPIDEMIA, UNSPECIFIED HYPERLIPIDEMIA TYPE: Primary | ICD-10-CM

## 2021-03-29 RX ORDER — FENOFIBRATE 160 MG/1
160 TABLET ORAL DAILY
Qty: 90 TABLET | Refills: 1 | OUTPATIENT
Start: 2021-03-29

## 2021-03-29 RX ORDER — FENOFIBRATE 160 MG/1
160 TABLET ORAL DAILY
Qty: 90 TABLET | Refills: 1 | Status: SHIPPED | OUTPATIENT
Start: 2021-03-29 | End: 2021-06-30

## 2021-04-09 ENCOUNTER — OFFICE VISIT (OUTPATIENT)
Dept: FAMILY MEDICINE | Facility: CLINIC | Age: 76
End: 2021-04-09
Payer: COMMERCIAL

## 2021-04-09 VITALS
OXYGEN SATURATION: 98 % | DIASTOLIC BLOOD PRESSURE: 71 MMHG | TEMPERATURE: 98 F | WEIGHT: 138.31 LBS | RESPIRATION RATE: 20 BRPM | HEIGHT: 66 IN | SYSTOLIC BLOOD PRESSURE: 151 MMHG | HEART RATE: 58 BPM | BODY MASS INDEX: 22.23 KG/M2

## 2021-04-09 DIAGNOSIS — I63.322 CEREBROVASCULAR ACCIDENT (CVA) DUE TO THROMBOSIS OF LEFT ANTERIOR CEREBRAL ARTERY: ICD-10-CM

## 2021-04-09 DIAGNOSIS — I25.10 CORONARY ARTERY DISEASE, ANGINA PRESENCE UNSPECIFIED, UNSPECIFIED VESSEL OR LESION TYPE, UNSPECIFIED WHETHER NATIVE OR TRANSPLANTED HEART: ICD-10-CM

## 2021-04-09 DIAGNOSIS — E78.5 HYPERLIPIDEMIA, UNSPECIFIED HYPERLIPIDEMIA TYPE: Primary | ICD-10-CM

## 2021-04-09 PROCEDURE — 99214 OFFICE O/P EST MOD 30 MIN: CPT | Mod: S$GLB,,, | Performed by: FAMILY MEDICINE

## 2021-04-09 PROCEDURE — 99214 PR OFFICE/OUTPT VISIT, EST, LEVL IV, 30-39 MIN: ICD-10-PCS | Mod: S$GLB,,, | Performed by: FAMILY MEDICINE

## 2021-04-09 RX ORDER — ATORVASTATIN CALCIUM 80 MG/1
80 TABLET, FILM COATED ORAL DAILY
Qty: 90 TABLET | Refills: 1 | Status: SHIPPED | OUTPATIENT
Start: 2021-04-09 | End: 2021-10-15

## 2021-04-13 ENCOUNTER — LAB VISIT (OUTPATIENT)
Dept: LAB | Facility: HOSPITAL | Age: 76
End: 2021-04-13
Attending: FAMILY MEDICINE
Payer: COMMERCIAL

## 2021-04-13 DIAGNOSIS — E78.5 HYPERLIPIDEMIA, UNSPECIFIED HYPERLIPIDEMIA TYPE: ICD-10-CM

## 2021-04-13 LAB
ALBUMIN SERPL BCP-MCNC: 4.1 G/DL (ref 3.5–5.2)
ALP SERPL-CCNC: 28 U/L (ref 55–135)
ALT SERPL W/O P-5'-P-CCNC: 23 U/L (ref 10–44)
ANION GAP SERPL CALC-SCNC: 7 MMOL/L (ref 8–16)
AST SERPL-CCNC: 30 U/L (ref 10–40)
BILIRUB SERPL-MCNC: 0.8 MG/DL (ref 0.1–1)
BUN SERPL-MCNC: 21 MG/DL (ref 8–23)
CALCIUM SERPL-MCNC: 9.1 MG/DL (ref 8.7–10.5)
CHLORIDE SERPL-SCNC: 108 MMOL/L (ref 95–110)
CHOLEST SERPL-MCNC: 159 MG/DL (ref 120–199)
CHOLEST/HDLC SERPL: 3.3 {RATIO} (ref 2–5)
CO2 SERPL-SCNC: 24 MMOL/L (ref 23–29)
CREAT SERPL-MCNC: 1.2 MG/DL (ref 0.5–1.4)
EST. GFR  (AFRICAN AMERICAN): 50.4 ML/MIN/1.73 M^2
EST. GFR  (NON AFRICAN AMERICAN): 43.7 ML/MIN/1.73 M^2
GLUCOSE SERPL-MCNC: 99 MG/DL (ref 70–110)
HDLC SERPL-MCNC: 48 MG/DL (ref 40–75)
HDLC SERPL: 30.2 % (ref 20–50)
LDLC SERPL CALC-MCNC: 93.8 MG/DL (ref 63–159)
NONHDLC SERPL-MCNC: 111 MG/DL
POTASSIUM SERPL-SCNC: 3.5 MMOL/L (ref 3.5–5.1)
PROT SERPL-MCNC: 7.5 G/DL (ref 6–8.4)
SODIUM SERPL-SCNC: 139 MMOL/L (ref 136–145)
TRIGL SERPL-MCNC: 86 MG/DL (ref 30–150)

## 2021-04-13 PROCEDURE — 36415 COLL VENOUS BLD VENIPUNCTURE: CPT | Performed by: FAMILY MEDICINE

## 2021-04-13 PROCEDURE — 80053 COMPREHEN METABOLIC PANEL: CPT | Performed by: FAMILY MEDICINE

## 2021-04-13 PROCEDURE — 80061 LIPID PANEL: CPT | Performed by: FAMILY MEDICINE

## 2021-04-14 ENCOUNTER — TELEPHONE (OUTPATIENT)
Dept: FAMILY MEDICINE | Facility: CLINIC | Age: 76
End: 2021-04-14

## 2021-04-30 ENCOUNTER — HOSPITAL ENCOUNTER (INPATIENT)
Facility: HOSPITAL | Age: 76
LOS: 3 days | Discharge: HOME | DRG: 101 | End: 2021-05-03
Attending: EMERGENCY MEDICINE | Admitting: INTERNAL MEDICINE
Payer: COMMERCIAL

## 2021-04-30 DIAGNOSIS — G40.901 STATUS EPILEPTICUS: Primary | ICD-10-CM

## 2021-04-30 LAB
ALBUMIN SERPL BCP-MCNC: 3.5 G/DL (ref 3.5–5.2)
ALBUMIN SERPL BCP-MCNC: 4.1 G/DL (ref 3.5–5.2)
ALP SERPL-CCNC: 22 U/L (ref 55–135)
ALP SERPL-CCNC: 27 U/L (ref 55–135)
ALT SERPL W/O P-5'-P-CCNC: 17 U/L (ref 10–44)
ALT SERPL W/O P-5'-P-CCNC: 21 U/L (ref 10–44)
ANION GAP SERPL CALC-SCNC: 11 MMOL/L (ref 8–16)
ANION GAP SERPL CALC-SCNC: 22 MMOL/L (ref 8–16)
AST SERPL-CCNC: 32 U/L (ref 10–40)
AST SERPL-CCNC: 36 U/L (ref 10–40)
BASOPHILS # BLD AUTO: 0.15 K/UL (ref 0–0.2)
BASOPHILS NFR BLD: 1.1 % (ref 0–1.9)
BILIRUB SERPL-MCNC: 0.8 MG/DL (ref 0.1–1)
BILIRUB SERPL-MCNC: 0.8 MG/DL (ref 0.1–1)
BUN SERPL-MCNC: 20 MG/DL (ref 8–23)
BUN SERPL-MCNC: 27 MG/DL (ref 8–23)
CALCIUM SERPL-MCNC: 5.9 MG/DL (ref 8.7–10.5)
CALCIUM SERPL-MCNC: 8.9 MG/DL (ref 8.7–10.5)
CHLORIDE SERPL-SCNC: 108 MMOL/L (ref 95–110)
CHLORIDE SERPL-SCNC: 111 MMOL/L (ref 95–110)
CHOLEST SERPL-MCNC: 174 MG/DL (ref 120–199)
CHOLEST/HDLC SERPL: 3.3 {RATIO} (ref 2–5)
CO2 SERPL-SCNC: 13 MMOL/L (ref 23–29)
CO2 SERPL-SCNC: 19 MMOL/L (ref 23–29)
CREAT SERPL-MCNC: 1 MG/DL (ref 0.5–1.4)
CREAT SERPL-MCNC: 1.3 MG/DL (ref 0.5–1.4)
CREAT SERPL-MCNC: 1.4 MG/DL (ref 0.5–1.4)
DIFFERENTIAL METHOD: ABNORMAL
EOSINOPHIL # BLD AUTO: 0.2 K/UL (ref 0–0.5)
EOSINOPHIL NFR BLD: 1.7 % (ref 0–8)
ERYTHROCYTE [DISTWIDTH] IN BLOOD BY AUTOMATED COUNT: 14.6 % (ref 11.5–14.5)
EST. GFR  (AFRICAN AMERICAN): 41.8 ML/MIN/1.73 M^2
EST. GFR  (AFRICAN AMERICAN): >60 ML/MIN/1.73 M^2
EST. GFR  (NON AFRICAN AMERICAN): 36.3 ML/MIN/1.73 M^2
EST. GFR  (NON AFRICAN AMERICAN): 54.5 ML/MIN/1.73 M^2
GLUCOSE SERPL-MCNC: 121 MG/DL (ref 70–110)
GLUCOSE SERPL-MCNC: 130 MG/DL (ref 70–110)
GLUCOSE SERPL-MCNC: 80 MG/DL (ref 70–110)
HCT VFR BLD AUTO: 41.6 % (ref 37–48.5)
HDLC SERPL-MCNC: 53 MG/DL (ref 40–75)
HDLC SERPL: 30.5 % (ref 20–50)
HGB BLD-MCNC: 12.7 G/DL (ref 12–16)
IMM GRANULOCYTES # BLD AUTO: 0.08 K/UL (ref 0–0.04)
IMM GRANULOCYTES NFR BLD AUTO: 0.6 % (ref 0–0.5)
INR PPP: 1.1
LDLC SERPL CALC-MCNC: 101.4 MG/DL (ref 63–159)
LYMPHOCYTES # BLD AUTO: 7.1 K/UL (ref 1–4.8)
LYMPHOCYTES NFR BLD: 52.1 % (ref 18–48)
MCH RBC QN AUTO: 27.9 PG (ref 27–31)
MCHC RBC AUTO-ENTMCNC: 30.5 G/DL (ref 32–36)
MCV RBC AUTO: 91 FL (ref 82–98)
MONOCYTES # BLD AUTO: 0.9 K/UL (ref 0.3–1)
MONOCYTES NFR BLD: 6.9 % (ref 4–15)
NEUTROPHILS # BLD AUTO: 5.1 K/UL (ref 1.8–7.7)
NEUTROPHILS NFR BLD: 37.6 % (ref 38–73)
NONHDLC SERPL-MCNC: 121 MG/DL
NRBC BLD-RTO: 0 /100 WBC
PLATELET # BLD AUTO: 340 K/UL (ref 150–450)
PMV BLD AUTO: 11.8 FL (ref 9.2–12.9)
POTASSIUM SERPL-SCNC: 3.5 MMOL/L (ref 3.5–5.1)
POTASSIUM SERPL-SCNC: 5.6 MMOL/L (ref 3.5–5.1)
PROT SERPL-MCNC: 6.5 G/DL (ref 6–8.4)
PROT SERPL-MCNC: 8 G/DL (ref 6–8.4)
PROTHROMBIN TIME: 14 SEC (ref 11.8–14.3)
RBC # BLD AUTO: 4.56 M/UL (ref 4–5.4)
SAMPLE: NORMAL
SARS-COV-2 RDRP RESP QL NAA+PROBE: NEGATIVE
SODIUM SERPL-SCNC: 141 MMOL/L (ref 136–145)
SODIUM SERPL-SCNC: 143 MMOL/L (ref 136–145)
TRIGL SERPL-MCNC: 98 MG/DL (ref 30–150)
TROPONIN I SERPL DL<=0.01 NG/ML-MCNC: <0.03 NG/ML
TSH SERPL DL<=0.005 MIU/L-ACNC: 1.86 UIU/ML (ref 0.34–5.6)
WBC # BLD AUTO: 13.58 K/UL (ref 3.9–12.7)

## 2021-04-30 PROCEDURE — 96375 TX/PRO/DX INJ NEW DRUG ADDON: CPT

## 2021-04-30 PROCEDURE — 20000000 HC ICU ROOM

## 2021-04-30 PROCEDURE — 25000003 PHARM REV CODE 250: Performed by: EMERGENCY MEDICINE

## 2021-04-30 PROCEDURE — 85610 PROTHROMBIN TIME: CPT | Performed by: EMERGENCY MEDICINE

## 2021-04-30 PROCEDURE — 96366 THER/PROPH/DIAG IV INF ADDON: CPT

## 2021-04-30 PROCEDURE — 93005 ELECTROCARDIOGRAM TRACING: CPT | Performed by: GENERAL PRACTICE

## 2021-04-30 PROCEDURE — 80175 DRUG SCREEN QUAN LAMOTRIGINE: CPT | Performed by: INTERNAL MEDICINE

## 2021-04-30 PROCEDURE — 84484 ASSAY OF TROPONIN QUANT: CPT | Performed by: EMERGENCY MEDICINE

## 2021-04-30 PROCEDURE — 25000003 PHARM REV CODE 250: Performed by: INTERNAL MEDICINE

## 2021-04-30 PROCEDURE — 84443 ASSAY THYROID STIM HORMONE: CPT | Performed by: EMERGENCY MEDICINE

## 2021-04-30 PROCEDURE — 36415 COLL VENOUS BLD VENIPUNCTURE: CPT | Performed by: INTERNAL MEDICINE

## 2021-04-30 PROCEDURE — 80061 LIPID PANEL: CPT | Performed by: EMERGENCY MEDICINE

## 2021-04-30 PROCEDURE — C9113 INJ PANTOPRAZOLE SODIUM, VIA: HCPCS | Performed by: INTERNAL MEDICINE

## 2021-04-30 PROCEDURE — 82962 GLUCOSE BLOOD TEST: CPT

## 2021-04-30 PROCEDURE — 99900035 HC TECH TIME PER 15 MIN (STAT)

## 2021-04-30 PROCEDURE — U0002 COVID-19 LAB TEST NON-CDC: HCPCS | Performed by: EMERGENCY MEDICINE

## 2021-04-30 PROCEDURE — 85025 COMPLETE CBC W/AUTO DIFF WBC: CPT | Performed by: EMERGENCY MEDICINE

## 2021-04-30 PROCEDURE — 63600175 PHARM REV CODE 636 W HCPCS: Performed by: EMERGENCY MEDICINE

## 2021-04-30 PROCEDURE — 94761 N-INVAS EAR/PLS OXIMETRY MLT: CPT

## 2021-04-30 PROCEDURE — 80053 COMPREHEN METABOLIC PANEL: CPT | Mod: 91 | Performed by: INTERNAL MEDICINE

## 2021-04-30 PROCEDURE — 95819 EEG AWAKE AND ASLEEP: CPT

## 2021-04-30 PROCEDURE — 96365 THER/PROPH/DIAG IV INF INIT: CPT

## 2021-04-30 PROCEDURE — 80053 COMPREHEN METABOLIC PANEL: CPT | Performed by: EMERGENCY MEDICINE

## 2021-04-30 PROCEDURE — 99291 CRITICAL CARE FIRST HOUR: CPT

## 2021-04-30 PROCEDURE — 25500020 PHARM REV CODE 255: Performed by: EMERGENCY MEDICINE

## 2021-04-30 PROCEDURE — 63600175 PHARM REV CODE 636 W HCPCS: Performed by: INTERNAL MEDICINE

## 2021-04-30 PROCEDURE — 36415 COLL VENOUS BLD VENIPUNCTURE: CPT | Performed by: EMERGENCY MEDICINE

## 2021-04-30 RX ORDER — LORAZEPAM 2 MG/ML
2 INJECTION INTRAMUSCULAR EVERY 5 MIN PRN
Status: DISCONTINUED | OUTPATIENT
Start: 2021-04-30 | End: 2021-05-03 | Stop reason: HOSPADM

## 2021-04-30 RX ORDER — SODIUM CHLORIDE 9 MG/ML
INJECTION, SOLUTION INTRAVENOUS CONTINUOUS
Status: DISCONTINUED | OUTPATIENT
Start: 2021-04-30 | End: 2021-05-03 | Stop reason: HOSPADM

## 2021-04-30 RX ORDER — LEVOTHYROXINE SODIUM ANHYDROUS 100 UG/5ML
37.5 INJECTION, POWDER, LYOPHILIZED, FOR SOLUTION INTRAVENOUS DAILY
Status: DISCONTINUED | OUTPATIENT
Start: 2021-05-01 | End: 2021-05-03 | Stop reason: HOSPADM

## 2021-04-30 RX ORDER — MORPHINE SULFATE 2 MG/ML
2 INJECTION, SOLUTION INTRAMUSCULAR; INTRAVENOUS EVERY 4 HOURS PRN
Status: DISCONTINUED | OUTPATIENT
Start: 2021-04-30 | End: 2021-05-03 | Stop reason: HOSPADM

## 2021-04-30 RX ORDER — LEVETIRACETAM 5 MG/ML
500 INJECTION INTRAVASCULAR EVERY 12 HOURS
Status: DISCONTINUED | OUTPATIENT
Start: 2021-04-30 | End: 2021-05-03 | Stop reason: HOSPADM

## 2021-04-30 RX ORDER — LEVETIRACETAM 10 MG/ML
1000 INJECTION INTRAVASCULAR EVERY 12 HOURS
Status: DISCONTINUED | OUTPATIENT
Start: 2021-04-30 | End: 2021-04-30

## 2021-04-30 RX ORDER — PANTOPRAZOLE SODIUM 40 MG/10ML
40 INJECTION, POWDER, LYOPHILIZED, FOR SOLUTION INTRAVENOUS DAILY
Status: DISCONTINUED | OUTPATIENT
Start: 2021-04-30 | End: 2021-05-03 | Stop reason: HOSPADM

## 2021-04-30 RX ORDER — POTASSIUM CHLORIDE 7.45 MG/ML
60 INJECTION INTRAVENOUS
Status: DISCONTINUED | OUTPATIENT
Start: 2021-04-30 | End: 2021-05-03 | Stop reason: HOSPADM

## 2021-04-30 RX ORDER — POTASSIUM CHLORIDE 7.45 MG/ML
80 INJECTION INTRAVENOUS
Status: DISCONTINUED | OUTPATIENT
Start: 2021-04-30 | End: 2021-05-03 | Stop reason: HOSPADM

## 2021-04-30 RX ORDER — ENOXAPARIN SODIUM 100 MG/ML
40 INJECTION SUBCUTANEOUS EVERY 24 HOURS
Status: DISCONTINUED | OUTPATIENT
Start: 2021-04-30 | End: 2021-05-03 | Stop reason: HOSPADM

## 2021-04-30 RX ORDER — HYDRALAZINE HYDROCHLORIDE 20 MG/ML
10 INJECTION INTRAMUSCULAR; INTRAVENOUS EVERY 4 HOURS PRN
Status: DISCONTINUED | OUTPATIENT
Start: 2021-04-30 | End: 2021-05-03 | Stop reason: HOSPADM

## 2021-04-30 RX ORDER — LORAZEPAM 2 MG/ML
2 INJECTION INTRAMUSCULAR
Status: COMPLETED | OUTPATIENT
Start: 2021-04-30 | End: 2021-04-30

## 2021-04-30 RX ORDER — METOPROLOL TARTRATE 1 MG/ML
5 INJECTION, SOLUTION INTRAVENOUS EVERY 5 MIN PRN
Status: DISCONTINUED | OUTPATIENT
Start: 2021-04-30 | End: 2021-05-03 | Stop reason: HOSPADM

## 2021-04-30 RX ORDER — MAGNESIUM SULFATE HEPTAHYDRATE 40 MG/ML
4 INJECTION, SOLUTION INTRAVENOUS
Status: DISCONTINUED | OUTPATIENT
Start: 2021-04-30 | End: 2021-05-03 | Stop reason: HOSPADM

## 2021-04-30 RX ORDER — MAGNESIUM SULFATE HEPTAHYDRATE 40 MG/ML
2 INJECTION, SOLUTION INTRAVENOUS
Status: DISCONTINUED | OUTPATIENT
Start: 2021-04-30 | End: 2021-05-03 | Stop reason: HOSPADM

## 2021-04-30 RX ORDER — POTASSIUM CHLORIDE 7.45 MG/ML
40 INJECTION INTRAVENOUS
Status: DISCONTINUED | OUTPATIENT
Start: 2021-04-30 | End: 2021-05-03 | Stop reason: HOSPADM

## 2021-04-30 RX ADMIN — ENOXAPARIN SODIUM 40 MG: 40 INJECTION SUBCUTANEOUS at 08:04

## 2021-04-30 RX ADMIN — SODIUM CHLORIDE: 0.9 INJECTION, SOLUTION INTRAVENOUS at 06:04

## 2021-04-30 RX ADMIN — DEXTROSE 2000 MG: 5 SOLUTION INTRAVENOUS at 10:04

## 2021-04-30 RX ADMIN — LORAZEPAM 1 MG: 2 INJECTION INTRAMUSCULAR; INTRAVENOUS at 09:04

## 2021-04-30 RX ADMIN — PANTOPRAZOLE SODIUM 40 MG: 40 INJECTION, POWDER, FOR SOLUTION INTRAVENOUS at 04:04

## 2021-04-30 RX ADMIN — LEVETIRACETAM 500 MG: 5 INJECTION INTRAVENOUS at 08:04

## 2021-04-30 RX ADMIN — LORAZEPAM 2 MG: 2 INJECTION INTRAMUSCULAR; INTRAVENOUS at 03:04

## 2021-04-30 RX ADMIN — IOHEXOL 100 ML: 350 INJECTION, SOLUTION INTRAVENOUS at 09:04

## 2021-05-01 LAB
ALBUMIN SERPL BCP-MCNC: 3.4 G/DL (ref 3.5–5.2)
ALP SERPL-CCNC: 22 U/L (ref 55–135)
ALT SERPL W/O P-5'-P-CCNC: 20 U/L (ref 10–44)
ANION GAP SERPL CALC-SCNC: 9 MMOL/L (ref 8–16)
AST SERPL-CCNC: 31 U/L (ref 10–40)
BASOPHILS # BLD AUTO: 0.06 K/UL (ref 0–0.2)
BASOPHILS NFR BLD: 0.8 % (ref 0–1.9)
BILIRUB SERPL-MCNC: 1 MG/DL (ref 0.1–1)
BUN SERPL-MCNC: 18 MG/DL (ref 8–23)
CALCIUM SERPL-MCNC: 8.4 MG/DL (ref 8.7–10.5)
CHLORIDE SERPL-SCNC: 113 MMOL/L (ref 95–110)
CO2 SERPL-SCNC: 20 MMOL/L (ref 23–29)
CREAT SERPL-MCNC: 1 MG/DL (ref 0.5–1.4)
DIFFERENTIAL METHOD: ABNORMAL
EOSINOPHIL # BLD AUTO: 0.1 K/UL (ref 0–0.5)
EOSINOPHIL NFR BLD: 1.4 % (ref 0–8)
ERYTHROCYTE [DISTWIDTH] IN BLOOD BY AUTOMATED COUNT: 14.6 % (ref 11.5–14.5)
EST. GFR  (AFRICAN AMERICAN): >60 ML/MIN/1.73 M^2
EST. GFR  (NON AFRICAN AMERICAN): 54.5 ML/MIN/1.73 M^2
GLUCOSE SERPL-MCNC: 84 MG/DL (ref 70–110)
HCT VFR BLD AUTO: 36 % (ref 37–48.5)
HGB BLD-MCNC: 11.2 G/DL (ref 12–16)
IMM GRANULOCYTES # BLD AUTO: 0.02 K/UL (ref 0–0.04)
IMM GRANULOCYTES NFR BLD AUTO: 0.3 % (ref 0–0.5)
LYMPHOCYTES # BLD AUTO: 2.4 K/UL (ref 1–4.8)
LYMPHOCYTES NFR BLD: 31.9 % (ref 18–48)
MAGNESIUM SERPL-MCNC: 1.8 MG/DL (ref 1.6–2.6)
MCH RBC QN AUTO: 27.6 PG (ref 27–31)
MCHC RBC AUTO-ENTMCNC: 31.1 G/DL (ref 32–36)
MCV RBC AUTO: 89 FL (ref 82–98)
MONOCYTES # BLD AUTO: 0.5 K/UL (ref 0.3–1)
MONOCYTES NFR BLD: 6.7 % (ref 4–15)
NEUTROPHILS # BLD AUTO: 4.5 K/UL (ref 1.8–7.7)
NEUTROPHILS NFR BLD: 58.9 % (ref 38–73)
NRBC BLD-RTO: 0 /100 WBC
PLATELET # BLD AUTO: 237 K/UL (ref 150–450)
PMV BLD AUTO: 11.2 FL (ref 9.2–12.9)
POTASSIUM SERPL-SCNC: 3 MMOL/L (ref 3.5–5.1)
PROT SERPL-MCNC: 6.4 G/DL (ref 6–8.4)
RBC # BLD AUTO: 4.06 M/UL (ref 4–5.4)
SODIUM SERPL-SCNC: 142 MMOL/L (ref 136–145)
WBC # BLD AUTO: 7.59 K/UL (ref 3.9–12.7)

## 2021-05-01 PROCEDURE — 36415 COLL VENOUS BLD VENIPUNCTURE: CPT | Performed by: INTERNAL MEDICINE

## 2021-05-01 PROCEDURE — 25000003 PHARM REV CODE 250

## 2021-05-01 PROCEDURE — 63600175 PHARM REV CODE 636 W HCPCS: Performed by: INTERNAL MEDICINE

## 2021-05-01 PROCEDURE — 80053 COMPREHEN METABOLIC PANEL: CPT | Performed by: INTERNAL MEDICINE

## 2021-05-01 PROCEDURE — 85025 COMPLETE CBC W/AUTO DIFF WBC: CPT | Performed by: INTERNAL MEDICINE

## 2021-05-01 PROCEDURE — 25000003 PHARM REV CODE 250: Performed by: INTERNAL MEDICINE

## 2021-05-01 PROCEDURE — 99900035 HC TECH TIME PER 15 MIN (STAT)

## 2021-05-01 PROCEDURE — 94761 N-INVAS EAR/PLS OXIMETRY MLT: CPT

## 2021-05-01 PROCEDURE — 20000000 HC ICU ROOM

## 2021-05-01 PROCEDURE — 83735 ASSAY OF MAGNESIUM: CPT | Performed by: INTERNAL MEDICINE

## 2021-05-01 PROCEDURE — C9113 INJ PANTOPRAZOLE SODIUM, VIA: HCPCS | Performed by: INTERNAL MEDICINE

## 2021-05-01 RX ORDER — MUPIROCIN 20 MG/G
OINTMENT TOPICAL 2 TIMES DAILY
Status: DISCONTINUED | OUTPATIENT
Start: 2021-05-01 | End: 2021-05-03 | Stop reason: HOSPADM

## 2021-05-01 RX ORDER — CHLORHEXIDINE GLUCONATE ORAL RINSE 1.2 MG/ML
15 SOLUTION DENTAL 2 TIMES DAILY
Status: DISCONTINUED | OUTPATIENT
Start: 2021-05-01 | End: 2021-05-03 | Stop reason: HOSPADM

## 2021-05-01 RX ORDER — POTASSIUM CHLORIDE 20 MEQ/1
40 TABLET, EXTENDED RELEASE ORAL
Status: DISCONTINUED | OUTPATIENT
Start: 2021-05-01 | End: 2021-05-03 | Stop reason: HOSPADM

## 2021-05-01 RX ORDER — POTASSIUM CHLORIDE 20 MEQ/1
20 TABLET, EXTENDED RELEASE ORAL
Status: DISCONTINUED | OUTPATIENT
Start: 2021-05-01 | End: 2021-05-03 | Stop reason: HOSPADM

## 2021-05-01 RX ADMIN — ENOXAPARIN SODIUM 40 MG: 40 INJECTION SUBCUTANEOUS at 05:05

## 2021-05-01 RX ADMIN — LORAZEPAM 1 MG: 2 INJECTION INTRAMUSCULAR; INTRAVENOUS at 08:05

## 2021-05-01 RX ADMIN — LEVETIRACETAM 500 MG: 5 INJECTION INTRAVENOUS at 08:05

## 2021-05-01 RX ADMIN — PANTOPRAZOLE SODIUM 40 MG: 40 INJECTION, POWDER, FOR SOLUTION INTRAVENOUS at 08:05

## 2021-05-01 RX ADMIN — POTASSIUM CHLORIDE 40 MEQ: 20 TABLET, EXTENDED RELEASE ORAL at 08:05

## 2021-05-01 RX ADMIN — LEVETIRACETAM 500 MG: 5 INJECTION INTRAVENOUS at 10:05

## 2021-05-01 RX ADMIN — LORAZEPAM 2 MG: 2 INJECTION INTRAMUSCULAR; INTRAVENOUS at 10:05

## 2021-05-01 RX ADMIN — MUPIROCIN: 20 OINTMENT TOPICAL at 10:05

## 2021-05-01 RX ADMIN — POTASSIUM CHLORIDE 40 MEQ: 7.46 INJECTION, SOLUTION INTRAVENOUS at 06:05

## 2021-05-01 RX ADMIN — SODIUM CHLORIDE 75 ML/HR: 0.9 INJECTION, SOLUTION INTRAVENOUS at 06:05

## 2021-05-01 RX ADMIN — SODIUM CHLORIDE: 0.9 INJECTION, SOLUTION INTRAVENOUS at 09:05

## 2021-05-01 RX ADMIN — CHLORHEXIDINE GLUCONATE 15 ML: 1.2 RINSE ORAL at 10:05

## 2021-05-01 RX ADMIN — LEVOTHYROXINE SODIUM ANHYDROUS 37.5 MCG: 100 INJECTION, POWDER, LYOPHILIZED, FOR SOLUTION INTRAVENOUS at 08:05

## 2021-05-01 RX ADMIN — LORAZEPAM 1 MG: 2 INJECTION INTRAMUSCULAR; INTRAVENOUS at 10:05

## 2021-05-02 LAB
ALBUMIN SERPL BCP-MCNC: 3.7 G/DL (ref 3.5–5.2)
ALP SERPL-CCNC: 24 U/L (ref 55–135)
ALT SERPL W/O P-5'-P-CCNC: 23 U/L (ref 10–44)
ANION GAP SERPL CALC-SCNC: 10 MMOL/L (ref 8–16)
AST SERPL-CCNC: 39 U/L (ref 10–40)
BASOPHILS # BLD AUTO: 0.06 K/UL (ref 0–0.2)
BASOPHILS NFR BLD: 1.1 % (ref 0–1.9)
BILIRUB SERPL-MCNC: 1.2 MG/DL (ref 0.1–1)
BUN SERPL-MCNC: 13 MG/DL (ref 8–23)
CALCIUM SERPL-MCNC: 8.6 MG/DL (ref 8.7–10.5)
CHLORIDE SERPL-SCNC: 111 MMOL/L (ref 95–110)
CO2 SERPL-SCNC: 20 MMOL/L (ref 23–29)
CREAT SERPL-MCNC: 0.8 MG/DL (ref 0.5–1.4)
DIFFERENTIAL METHOD: ABNORMAL
EOSINOPHIL # BLD AUTO: 0.1 K/UL (ref 0–0.5)
EOSINOPHIL NFR BLD: 1.8 % (ref 0–8)
ERYTHROCYTE [DISTWIDTH] IN BLOOD BY AUTOMATED COUNT: 14.4 % (ref 11.5–14.5)
EST. GFR  (AFRICAN AMERICAN): >60 ML/MIN/1.73 M^2
EST. GFR  (NON AFRICAN AMERICAN): >60 ML/MIN/1.73 M^2
GLUCOSE SERPL-MCNC: 101 MG/DL (ref 70–110)
GLUCOSE SERPL-MCNC: 65 MG/DL (ref 70–110)
GLUCOSE SERPL-MCNC: 71 MG/DL (ref 70–110)
GLUCOSE SERPL-MCNC: 81 MG/DL (ref 70–110)
GLUCOSE SERPL-MCNC: 89 MG/DL (ref 70–110)
HCT VFR BLD AUTO: 38.3 % (ref 37–48.5)
HGB BLD-MCNC: 12 G/DL (ref 12–16)
IMM GRANULOCYTES # BLD AUTO: 0.01 K/UL (ref 0–0.04)
IMM GRANULOCYTES NFR BLD AUTO: 0.2 % (ref 0–0.5)
LYMPHOCYTES # BLD AUTO: 1.6 K/UL (ref 1–4.8)
LYMPHOCYTES NFR BLD: 27.9 % (ref 18–48)
MAGNESIUM SERPL-MCNC: 1.7 MG/DL (ref 1.6–2.6)
MCH RBC QN AUTO: 28 PG (ref 27–31)
MCHC RBC AUTO-ENTMCNC: 31.3 G/DL (ref 32–36)
MCV RBC AUTO: 89 FL (ref 82–98)
MONOCYTES # BLD AUTO: 0.4 K/UL (ref 0.3–1)
MONOCYTES NFR BLD: 7.4 % (ref 4–15)
NEUTROPHILS # BLD AUTO: 3.4 K/UL (ref 1.8–7.7)
NEUTROPHILS NFR BLD: 61.6 % (ref 38–73)
NRBC BLD-RTO: 0 /100 WBC
PLATELET # BLD AUTO: 231 K/UL (ref 150–450)
PMV BLD AUTO: 11.3 FL (ref 9.2–12.9)
POTASSIUM SERPL-SCNC: 3.3 MMOL/L (ref 3.5–5.1)
PROT SERPL-MCNC: 6.9 G/DL (ref 6–8.4)
RBC # BLD AUTO: 4.29 M/UL (ref 4–5.4)
SODIUM SERPL-SCNC: 141 MMOL/L (ref 136–145)
WBC # BLD AUTO: 5.56 K/UL (ref 3.9–12.7)

## 2021-05-02 PROCEDURE — 25000003 PHARM REV CODE 250

## 2021-05-02 PROCEDURE — 63600175 PHARM REV CODE 636 W HCPCS: Performed by: INTERNAL MEDICINE

## 2021-05-02 PROCEDURE — 25000003 PHARM REV CODE 250: Performed by: INTERNAL MEDICINE

## 2021-05-02 PROCEDURE — 25000003 PHARM REV CODE 250: Performed by: STUDENT IN AN ORGANIZED HEALTH CARE EDUCATION/TRAINING PROGRAM

## 2021-05-02 PROCEDURE — 80053 COMPREHEN METABOLIC PANEL: CPT | Performed by: INTERNAL MEDICINE

## 2021-05-02 PROCEDURE — 97535 SELF CARE MNGMENT TRAINING: CPT

## 2021-05-02 PROCEDURE — 36415 COLL VENOUS BLD VENIPUNCTURE: CPT | Performed by: INTERNAL MEDICINE

## 2021-05-02 PROCEDURE — 97161 PT EVAL LOW COMPLEX 20 MIN: CPT

## 2021-05-02 PROCEDURE — C9113 INJ PANTOPRAZOLE SODIUM, VIA: HCPCS | Performed by: INTERNAL MEDICINE

## 2021-05-02 PROCEDURE — 83735 ASSAY OF MAGNESIUM: CPT | Performed by: INTERNAL MEDICINE

## 2021-05-02 PROCEDURE — 21400001 HC TELEMETRY ROOM

## 2021-05-02 PROCEDURE — 97166 OT EVAL MOD COMPLEX 45 MIN: CPT

## 2021-05-02 PROCEDURE — 97530 THERAPEUTIC ACTIVITIES: CPT

## 2021-05-02 PROCEDURE — 85025 COMPLETE CBC W/AUTO DIFF WBC: CPT | Performed by: INTERNAL MEDICINE

## 2021-05-02 RX ORDER — ATORVASTATIN CALCIUM 40 MG/1
40 TABLET, FILM COATED ORAL NIGHTLY
Status: DISCONTINUED | OUTPATIENT
Start: 2021-05-02 | End: 2021-05-03 | Stop reason: HOSPADM

## 2021-05-02 RX ORDER — ASPIRIN 81 MG/1
81 TABLET ORAL DAILY
Status: DISCONTINUED | OUTPATIENT
Start: 2021-05-03 | End: 2021-05-03 | Stop reason: HOSPADM

## 2021-05-02 RX ADMIN — MAGNESIUM SULFATE 2 G: 2 INJECTION INTRAVENOUS at 08:05

## 2021-05-02 RX ADMIN — CHLORHEXIDINE GLUCONATE 15 ML: 1.2 RINSE ORAL at 09:05

## 2021-05-02 RX ADMIN — MUPIROCIN: 20 OINTMENT TOPICAL at 09:05

## 2021-05-02 RX ADMIN — CHLORHEXIDINE GLUCONATE 15 ML: 1.2 RINSE ORAL at 08:05

## 2021-05-02 RX ADMIN — LEVOTHYROXINE SODIUM ANHYDROUS 37.5 MCG: 100 INJECTION, POWDER, LYOPHILIZED, FOR SOLUTION INTRAVENOUS at 10:05

## 2021-05-02 RX ADMIN — LEVETIRACETAM 500 MG: 5 INJECTION INTRAVENOUS at 10:05

## 2021-05-02 RX ADMIN — PANTOPRAZOLE SODIUM 40 MG: 40 INJECTION, POWDER, FOR SOLUTION INTRAVENOUS at 08:05

## 2021-05-02 RX ADMIN — LEVETIRACETAM 500 MG: 5 INJECTION INTRAVENOUS at 09:05

## 2021-05-02 RX ADMIN — POTASSIUM CHLORIDE 40 MEQ: 20 TABLET, EXTENDED RELEASE ORAL at 08:05

## 2021-05-02 RX ADMIN — MUPIROCIN: 20 OINTMENT TOPICAL at 08:05

## 2021-05-02 RX ADMIN — ATORVASTATIN CALCIUM 40 MG: 40 TABLET, FILM COATED ORAL at 09:05

## 2021-05-03 VITALS
TEMPERATURE: 98 F | RESPIRATION RATE: 18 BRPM | BODY MASS INDEX: 22.64 KG/M2 | SYSTOLIC BLOOD PRESSURE: 156 MMHG | OXYGEN SATURATION: 96 % | HEIGHT: 66 IN | DIASTOLIC BLOOD PRESSURE: 101 MMHG | WEIGHT: 140.88 LBS | HEART RATE: 88 BPM

## 2021-05-03 LAB
ALBUMIN SERPL BCP-MCNC: 3.5 G/DL (ref 3.5–5.2)
ALP SERPL-CCNC: 25 U/L (ref 55–135)
ALT SERPL W/O P-5'-P-CCNC: 20 U/L (ref 10–44)
ANION GAP SERPL CALC-SCNC: 11 MMOL/L (ref 8–16)
AST SERPL-CCNC: 38 U/L (ref 10–40)
BASOPHILS # BLD AUTO: 0.05 K/UL (ref 0–0.2)
BASOPHILS NFR BLD: 0.9 % (ref 0–1.9)
BILIRUB SERPL-MCNC: 1.1 MG/DL (ref 0.1–1)
BUN SERPL-MCNC: 10 MG/DL (ref 8–23)
CALCIUM SERPL-MCNC: 8.8 MG/DL (ref 8.7–10.5)
CHLORIDE SERPL-SCNC: 109 MMOL/L (ref 95–110)
CO2 SERPL-SCNC: 21 MMOL/L (ref 23–29)
CREAT SERPL-MCNC: 0.8 MG/DL (ref 0.5–1.4)
DIFFERENTIAL METHOD: ABNORMAL
EOSINOPHIL # BLD AUTO: 0.2 K/UL (ref 0–0.5)
EOSINOPHIL NFR BLD: 2.9 % (ref 0–8)
ERYTHROCYTE [DISTWIDTH] IN BLOOD BY AUTOMATED COUNT: 14 % (ref 11.5–14.5)
EST. GFR  (AFRICAN AMERICAN): >60 ML/MIN/1.73 M^2
EST. GFR  (NON AFRICAN AMERICAN): >60 ML/MIN/1.73 M^2
GLUCOSE SERPL-MCNC: 112 MG/DL (ref 70–110)
GLUCOSE SERPL-MCNC: 74 MG/DL (ref 70–110)
GLUCOSE SERPL-MCNC: 82 MG/DL (ref 70–110)
GLUCOSE SERPL-MCNC: 92 MG/DL (ref 70–110)
HCT VFR BLD AUTO: 34.4 % (ref 37–48.5)
HGB BLD-MCNC: 11.2 G/DL (ref 12–16)
IMM GRANULOCYTES # BLD AUTO: 0.03 K/UL (ref 0–0.04)
IMM GRANULOCYTES NFR BLD AUTO: 0.5 % (ref 0–0.5)
LAMOTRIGINE SERPL-MCNC: 4 UG/ML (ref 2–20)
LYMPHOCYTES # BLD AUTO: 1.8 K/UL (ref 1–4.8)
LYMPHOCYTES NFR BLD: 29.8 % (ref 18–48)
MAGNESIUM SERPL-MCNC: 1.8 MG/DL (ref 1.6–2.6)
MCH RBC QN AUTO: 28.4 PG (ref 27–31)
MCHC RBC AUTO-ENTMCNC: 32.6 G/DL (ref 32–36)
MCV RBC AUTO: 87 FL (ref 82–98)
MONOCYTES # BLD AUTO: 0.5 K/UL (ref 0.3–1)
MONOCYTES NFR BLD: 8.2 % (ref 4–15)
NEUTROPHILS # BLD AUTO: 3.4 K/UL (ref 1.8–7.7)
NEUTROPHILS NFR BLD: 57.7 % (ref 38–73)
NRBC BLD-RTO: 0 /100 WBC
PLATELET # BLD AUTO: 235 K/UL (ref 150–450)
PMV BLD AUTO: 11.4 FL (ref 9.2–12.9)
POTASSIUM SERPL-SCNC: 3.3 MMOL/L (ref 3.5–5.1)
PROT SERPL-MCNC: 6.5 G/DL (ref 6–8.4)
RBC # BLD AUTO: 3.94 M/UL (ref 4–5.4)
SODIUM SERPL-SCNC: 141 MMOL/L (ref 136–145)
WBC # BLD AUTO: 5.88 K/UL (ref 3.9–12.7)

## 2021-05-03 PROCEDURE — 83735 ASSAY OF MAGNESIUM: CPT | Performed by: INTERNAL MEDICINE

## 2021-05-03 PROCEDURE — 82962 GLUCOSE BLOOD TEST: CPT

## 2021-05-03 PROCEDURE — 97535 SELF CARE MNGMENT TRAINING: CPT

## 2021-05-03 PROCEDURE — 85025 COMPLETE CBC W/AUTO DIFF WBC: CPT | Performed by: INTERNAL MEDICINE

## 2021-05-03 PROCEDURE — 97116 GAIT TRAINING THERAPY: CPT | Mod: CQ

## 2021-05-03 PROCEDURE — 25000003 PHARM REV CODE 250: Performed by: STUDENT IN AN ORGANIZED HEALTH CARE EDUCATION/TRAINING PROGRAM

## 2021-05-03 PROCEDURE — 36415 COLL VENOUS BLD VENIPUNCTURE: CPT | Performed by: INTERNAL MEDICINE

## 2021-05-03 PROCEDURE — 92610 EVALUATE SWALLOWING FUNCTION: CPT

## 2021-05-03 PROCEDURE — 63600175 PHARM REV CODE 636 W HCPCS: Performed by: INTERNAL MEDICINE

## 2021-05-03 PROCEDURE — 80053 COMPREHEN METABOLIC PANEL: CPT | Performed by: INTERNAL MEDICINE

## 2021-05-03 PROCEDURE — C9113 INJ PANTOPRAZOLE SODIUM, VIA: HCPCS | Performed by: INTERNAL MEDICINE

## 2021-05-03 PROCEDURE — 25000003 PHARM REV CODE 250: Performed by: INTERNAL MEDICINE

## 2021-05-03 RX ORDER — LEVETIRACETAM 500 MG/1
500 TABLET ORAL 2 TIMES DAILY
Qty: 60 TABLET | Refills: 11 | Status: SHIPPED | OUTPATIENT
Start: 2021-05-03 | End: 2022-07-20

## 2021-05-03 RX ADMIN — LEVETIRACETAM 500 MG: 5 INJECTION INTRAVENOUS at 09:05

## 2021-05-03 RX ADMIN — PANTOPRAZOLE SODIUM 40 MG: 40 INJECTION, POWDER, FOR SOLUTION INTRAVENOUS at 09:05

## 2021-05-03 RX ADMIN — ENOXAPARIN SODIUM 40 MG: 40 INJECTION SUBCUTANEOUS at 04:05

## 2021-05-03 RX ADMIN — MUPIROCIN: 20 OINTMENT TOPICAL at 09:05

## 2021-05-03 RX ADMIN — ASPIRIN 81 MG: 81 TABLET, DELAYED RELEASE ORAL at 09:05

## 2021-05-03 RX ADMIN — POTASSIUM CHLORIDE 40 MEQ: 20 TABLET, EXTENDED RELEASE ORAL at 06:05

## 2021-05-03 RX ADMIN — LEVOTHYROXINE SODIUM ANHYDROUS 37.5 MCG: 100 INJECTION, POWDER, LYOPHILIZED, FOR SOLUTION INTRAVENOUS at 11:05

## 2021-05-03 RX ADMIN — MAGNESIUM SULFATE 2 G: 2 INJECTION INTRAVENOUS at 09:05

## 2021-06-11 ENCOUNTER — OFFICE VISIT (OUTPATIENT)
Dept: FAMILY MEDICINE | Facility: CLINIC | Age: 76
End: 2021-06-11
Payer: COMMERCIAL

## 2021-06-11 VITALS
SYSTOLIC BLOOD PRESSURE: 140 MMHG | BODY MASS INDEX: 21.44 KG/M2 | HEART RATE: 75 BPM | OXYGEN SATURATION: 98 % | WEIGHT: 133.38 LBS | RESPIRATION RATE: 17 BRPM | TEMPERATURE: 99 F | HEIGHT: 66 IN | DIASTOLIC BLOOD PRESSURE: 72 MMHG

## 2021-06-11 DIAGNOSIS — R56.9 SEIZURE: ICD-10-CM

## 2021-06-11 DIAGNOSIS — E78.5 HYPERLIPIDEMIA, UNSPECIFIED HYPERLIPIDEMIA TYPE: ICD-10-CM

## 2021-06-11 DIAGNOSIS — G40.909 SEIZURE DISORDER: Primary | ICD-10-CM

## 2021-06-11 DIAGNOSIS — I10 HYPERTENSION, ESSENTIAL: ICD-10-CM

## 2021-06-11 PROCEDURE — 99214 OFFICE O/P EST MOD 30 MIN: CPT | Mod: S$GLB,,, | Performed by: FAMILY MEDICINE

## 2021-06-11 PROCEDURE — 99214 PR OFFICE/OUTPT VISIT, EST, LEVL IV, 30-39 MIN: ICD-10-PCS | Mod: S$GLB,,, | Performed by: FAMILY MEDICINE

## 2021-06-16 ENCOUNTER — LAB VISIT (OUTPATIENT)
Dept: LAB | Facility: HOSPITAL | Age: 76
End: 2021-06-16
Attending: FAMILY MEDICINE
Payer: COMMERCIAL

## 2021-06-16 DIAGNOSIS — G40.909 SEIZURE DISORDER: ICD-10-CM

## 2021-06-16 DIAGNOSIS — R56.9 SEIZURE: ICD-10-CM

## 2021-06-16 DIAGNOSIS — D63.8 ANEMIA IN OTHER CHRONIC DISEASES CLASSIFIED ELSEWHERE: ICD-10-CM

## 2021-06-16 DIAGNOSIS — E87.6 HYPOKALEMIA: Primary | ICD-10-CM

## 2021-06-16 LAB
ALBUMIN SERPL BCP-MCNC: 3.5 G/DL (ref 3.5–5.2)
ALP SERPL-CCNC: 33 U/L (ref 55–135)
ALT SERPL W/O P-5'-P-CCNC: 16 U/L (ref 10–44)
ANION GAP SERPL CALC-SCNC: 10 MMOL/L (ref 8–16)
AST SERPL-CCNC: 23 U/L (ref 10–40)
BASOPHILS # BLD AUTO: 0.07 K/UL (ref 0–0.2)
BASOPHILS NFR BLD: 1.1 % (ref 0–1.9)
BILIRUB SERPL-MCNC: 0.9 MG/DL (ref 0.1–1)
BUN SERPL-MCNC: 20 MG/DL (ref 8–23)
CALCIUM SERPL-MCNC: 10.3 MG/DL (ref 8.7–10.5)
CHLORIDE SERPL-SCNC: 105 MMOL/L (ref 95–110)
CK SERPL-CCNC: 58 U/L (ref 20–180)
CO2 SERPL-SCNC: 28 MMOL/L (ref 23–29)
CREAT SERPL-MCNC: 1.1 MG/DL (ref 0.5–1.4)
DIFFERENTIAL METHOD: ABNORMAL
EOSINOPHIL # BLD AUTO: 0.3 K/UL (ref 0–0.5)
EOSINOPHIL NFR BLD: 4.2 % (ref 0–8)
ERYTHROCYTE [DISTWIDTH] IN BLOOD BY AUTOMATED COUNT: 13 % (ref 11.5–14.5)
EST. GFR  (AFRICAN AMERICAN): 56 ML/MIN/1.73 M^2
EST. GFR  (NON AFRICAN AMERICAN): 48.5 ML/MIN/1.73 M^2
GLUCOSE SERPL-MCNC: 101 MG/DL (ref 70–110)
HCT VFR BLD AUTO: 33.8 % (ref 37–48.5)
HGB BLD-MCNC: 10.7 G/DL (ref 12–16)
IMM GRANULOCYTES # BLD AUTO: 0.04 K/UL (ref 0–0.04)
IMM GRANULOCYTES NFR BLD AUTO: 0.6 % (ref 0–0.5)
LYMPHOCYTES # BLD AUTO: 1.7 K/UL (ref 1–4.8)
LYMPHOCYTES NFR BLD: 27 % (ref 18–48)
MCH RBC QN AUTO: 28.2 PG (ref 27–31)
MCHC RBC AUTO-ENTMCNC: 31.7 G/DL (ref 32–36)
MCV RBC AUTO: 89 FL (ref 82–98)
MONOCYTES # BLD AUTO: 0.5 K/UL (ref 0.3–1)
MONOCYTES NFR BLD: 7.5 % (ref 4–15)
NEUTROPHILS # BLD AUTO: 3.7 K/UL (ref 1.8–7.7)
NEUTROPHILS NFR BLD: 59.6 % (ref 38–73)
NRBC BLD-RTO: 0 /100 WBC
PLATELET # BLD AUTO: 352 K/UL (ref 150–450)
PMV BLD AUTO: 10.9 FL (ref 9.2–12.9)
POTASSIUM SERPL-SCNC: 3.4 MMOL/L (ref 3.5–5.1)
PROT SERPL-MCNC: 7.1 G/DL (ref 6–8.4)
RBC # BLD AUTO: 3.8 M/UL (ref 4–5.4)
SODIUM SERPL-SCNC: 143 MMOL/L (ref 136–145)
WBC # BLD AUTO: 6.23 K/UL (ref 3.9–12.7)

## 2021-06-16 PROCEDURE — 80175 DRUG SCREEN QUAN LAMOTRIGINE: CPT | Performed by: FAMILY MEDICINE

## 2021-06-16 PROCEDURE — 85025 COMPLETE CBC W/AUTO DIFF WBC: CPT | Performed by: FAMILY MEDICINE

## 2021-06-16 PROCEDURE — 36415 COLL VENOUS BLD VENIPUNCTURE: CPT | Performed by: FAMILY MEDICINE

## 2021-06-16 PROCEDURE — 82550 ASSAY OF CK (CPK): CPT | Performed by: FAMILY MEDICINE

## 2021-06-16 PROCEDURE — 80053 COMPREHEN METABOLIC PANEL: CPT | Performed by: FAMILY MEDICINE

## 2021-06-16 RX ORDER — POTASSIUM CHLORIDE 750 MG/1
10 CAPSULE, EXTENDED RELEASE ORAL ONCE
Qty: 1 CAPSULE | Refills: 0 | Status: SHIPPED | OUTPATIENT
Start: 2021-06-16 | End: 2021-06-16

## 2021-06-18 LAB — LAMOTRIGINE SERPL-MCNC: 6.3 UG/ML (ref 2–20)

## 2021-08-27 ENCOUNTER — IMMUNIZATION (OUTPATIENT)
Dept: PRIMARY CARE CLINIC | Facility: CLINIC | Age: 76
End: 2021-08-27
Payer: COMMERCIAL

## 2021-08-27 DIAGNOSIS — Z23 NEED FOR VACCINATION: Primary | ICD-10-CM

## 2021-08-27 PROCEDURE — 0001A COVID-19, MRNA, LNP-S, PF, 30 MCG/0.3 ML DOSE VACCINE: CPT | Mod: CV19,S$GLB,, | Performed by: FAMILY MEDICINE

## 2021-08-27 PROCEDURE — 91300 COVID-19, MRNA, LNP-S, PF, 30 MCG/0.3 ML DOSE VACCINE: CPT | Mod: S$GLB,,, | Performed by: FAMILY MEDICINE

## 2021-08-27 PROCEDURE — 0001A COVID-19, MRNA, LNP-S, PF, 30 MCG/0.3 ML DOSE VACCINE: ICD-10-PCS | Mod: CV19,S$GLB,, | Performed by: FAMILY MEDICINE

## 2021-08-27 PROCEDURE — 91300 COVID-19, MRNA, LNP-S, PF, 30 MCG/0.3 ML DOSE VACCINE: ICD-10-PCS | Mod: S$GLB,,, | Performed by: FAMILY MEDICINE

## 2021-09-15 ENCOUNTER — OFFICE VISIT (OUTPATIENT)
Dept: FAMILY MEDICINE | Facility: CLINIC | Age: 76
End: 2021-09-15
Payer: COMMERCIAL

## 2021-09-15 VITALS
RESPIRATION RATE: 20 BRPM | TEMPERATURE: 98 F | HEART RATE: 50 BPM | SYSTOLIC BLOOD PRESSURE: 132 MMHG | DIASTOLIC BLOOD PRESSURE: 78 MMHG | OXYGEN SATURATION: 97 % | WEIGHT: 126.88 LBS | BODY MASS INDEX: 20.48 KG/M2

## 2021-09-15 DIAGNOSIS — F02.80 DEMENTIA ASSOCIATED WITH OTHER UNDERLYING DISEASE WITHOUT BEHAVIORAL DISTURBANCE: ICD-10-CM

## 2021-09-15 DIAGNOSIS — G40.909 SEIZURE DISORDER: Primary | ICD-10-CM

## 2021-09-15 DIAGNOSIS — E78.2 MIXED HYPERLIPIDEMIA: ICD-10-CM

## 2021-09-15 DIAGNOSIS — R53.81 DEBILITY: ICD-10-CM

## 2021-09-15 PROCEDURE — 99214 PR OFFICE/OUTPT VISIT, EST, LEVL IV, 30-39 MIN: ICD-10-PCS | Mod: S$GLB,,, | Performed by: FAMILY MEDICINE

## 2021-09-15 PROCEDURE — 99214 OFFICE O/P EST MOD 30 MIN: CPT | Mod: S$GLB,,, | Performed by: FAMILY MEDICINE

## 2021-09-15 RX ORDER — DONEPEZIL HYDROCHLORIDE 10 MG/1
10 TABLET, FILM COATED ORAL DAILY
Qty: 90 TABLET | Refills: 1 | Status: SHIPPED | OUTPATIENT
Start: 2021-09-15 | End: 2022-03-11

## 2021-09-17 ENCOUNTER — IMMUNIZATION (OUTPATIENT)
Dept: PRIMARY CARE CLINIC | Facility: CLINIC | Age: 76
End: 2021-09-17
Payer: COMMERCIAL

## 2021-09-17 DIAGNOSIS — Z23 NEED FOR VACCINATION: Primary | ICD-10-CM

## 2021-09-17 PROCEDURE — 91300 COVID-19, MRNA, LNP-S, PF, 30 MCG/0.3 ML DOSE VACCINE: CPT | Mod: S$GLB,,, | Performed by: FAMILY MEDICINE

## 2021-09-17 PROCEDURE — 91300 COVID-19, MRNA, LNP-S, PF, 30 MCG/0.3 ML DOSE VACCINE: ICD-10-PCS | Mod: S$GLB,,, | Performed by: FAMILY MEDICINE

## 2021-09-17 PROCEDURE — 0002A COVID-19, MRNA, LNP-S, PF, 30 MCG/0.3 ML DOSE VACCINE: ICD-10-PCS | Mod: CV19,S$GLB,, | Performed by: FAMILY MEDICINE

## 2021-09-17 PROCEDURE — 0002A COVID-19, MRNA, LNP-S, PF, 30 MCG/0.3 ML DOSE VACCINE: CPT | Mod: CV19,S$GLB,, | Performed by: FAMILY MEDICINE

## 2021-10-14 DIAGNOSIS — E78.5 HYPERLIPIDEMIA, UNSPECIFIED HYPERLIPIDEMIA TYPE: ICD-10-CM

## 2021-10-15 RX ORDER — ATORVASTATIN CALCIUM 80 MG/1
TABLET, FILM COATED ORAL
Qty: 90 TABLET | Refills: 0 | Status: SHIPPED | OUTPATIENT
Start: 2021-10-15 | End: 2022-01-09

## 2021-10-19 ENCOUNTER — LAB VISIT (OUTPATIENT)
Dept: LAB | Facility: HOSPITAL | Age: 76
End: 2021-10-19
Attending: FAMILY MEDICINE
Payer: COMMERCIAL

## 2021-10-19 ENCOUNTER — TELEPHONE (OUTPATIENT)
Dept: FAMILY MEDICINE | Facility: CLINIC | Age: 76
End: 2021-10-19

## 2021-10-19 DIAGNOSIS — E87.6 HYPOKALEMIA: ICD-10-CM

## 2021-10-19 DIAGNOSIS — G40.909 SEIZURE DISORDER: ICD-10-CM

## 2021-10-19 DIAGNOSIS — E78.2 MIXED HYPERLIPIDEMIA: ICD-10-CM

## 2021-10-19 DIAGNOSIS — D63.8 ANEMIA IN OTHER CHRONIC DISEASES CLASSIFIED ELSEWHERE: ICD-10-CM

## 2021-10-19 LAB
ALBUMIN SERPL BCP-MCNC: 3.9 G/DL (ref 3.5–5.2)
ALP SERPL-CCNC: 66 U/L (ref 55–135)
ALT SERPL W/O P-5'-P-CCNC: 26 U/L (ref 10–44)
ANION GAP SERPL CALC-SCNC: 11 MMOL/L (ref 8–16)
ANION GAP SERPL CALC-SCNC: 11 MMOL/L (ref 8–16)
AST SERPL-CCNC: 25 U/L (ref 10–40)
BASOPHILS # BLD AUTO: 0.04 K/UL (ref 0–0.2)
BASOPHILS NFR BLD: 0.5 % (ref 0–1.9)
BILIRUB SERPL-MCNC: 0.5 MG/DL (ref 0.1–1)
BUN SERPL-MCNC: 22 MG/DL (ref 8–23)
BUN SERPL-MCNC: 22 MG/DL (ref 8–23)
CALCIUM SERPL-MCNC: 10.2 MG/DL (ref 8.7–10.5)
CALCIUM SERPL-MCNC: 10.2 MG/DL (ref 8.7–10.5)
CHLORIDE SERPL-SCNC: 106 MMOL/L (ref 95–110)
CHLORIDE SERPL-SCNC: 106 MMOL/L (ref 95–110)
CHOLEST SERPL-MCNC: 142 MG/DL (ref 120–199)
CHOLEST/HDLC SERPL: 2.6 {RATIO} (ref 2–5)
CO2 SERPL-SCNC: 28 MMOL/L (ref 23–29)
CO2 SERPL-SCNC: 28 MMOL/L (ref 23–29)
CREAT SERPL-MCNC: 0.9 MG/DL (ref 0.5–1.4)
CREAT SERPL-MCNC: 0.9 MG/DL (ref 0.5–1.4)
DIFFERENTIAL METHOD: ABNORMAL
EOSINOPHIL # BLD AUTO: 0.2 K/UL (ref 0–0.5)
EOSINOPHIL NFR BLD: 2.4 % (ref 0–8)
ERYTHROCYTE [DISTWIDTH] IN BLOOD BY AUTOMATED COUNT: 14.3 % (ref 11.5–14.5)
EST. GFR  (AFRICAN AMERICAN): >60 ML/MIN/1.73 M^2
EST. GFR  (AFRICAN AMERICAN): >60 ML/MIN/1.73 M^2
EST. GFR  (NON AFRICAN AMERICAN): >60 ML/MIN/1.73 M^2
EST. GFR  (NON AFRICAN AMERICAN): >60 ML/MIN/1.73 M^2
GLUCOSE SERPL-MCNC: 102 MG/DL (ref 70–110)
GLUCOSE SERPL-MCNC: 102 MG/DL (ref 70–110)
HCT VFR BLD AUTO: 40.9 % (ref 37–48.5)
HDLC SERPL-MCNC: 55 MG/DL (ref 40–75)
HDLC SERPL: 38.7 % (ref 20–50)
HGB BLD-MCNC: 12.5 G/DL (ref 12–16)
IMM GRANULOCYTES # BLD AUTO: 0.02 K/UL (ref 0–0.04)
IMM GRANULOCYTES NFR BLD AUTO: 0.2 % (ref 0–0.5)
LDLC SERPL CALC-MCNC: 66.2 MG/DL (ref 63–159)
LYMPHOCYTES # BLD AUTO: 2.2 K/UL (ref 1–4.8)
LYMPHOCYTES NFR BLD: 26 % (ref 18–48)
MCH RBC QN AUTO: 26.8 PG (ref 27–31)
MCHC RBC AUTO-ENTMCNC: 30.6 G/DL (ref 32–36)
MCV RBC AUTO: 88 FL (ref 82–98)
MONOCYTES # BLD AUTO: 0.7 K/UL (ref 0.3–1)
MONOCYTES NFR BLD: 8 % (ref 4–15)
NEUTROPHILS # BLD AUTO: 5.3 K/UL (ref 1.8–7.7)
NEUTROPHILS NFR BLD: 62.9 % (ref 38–73)
NONHDLC SERPL-MCNC: 87 MG/DL
NRBC BLD-RTO: 0 /100 WBC
PLATELET # BLD AUTO: 230 K/UL (ref 150–450)
PMV BLD AUTO: 10.9 FL (ref 9.2–12.9)
POTASSIUM SERPL-SCNC: 3.9 MMOL/L (ref 3.5–5.1)
POTASSIUM SERPL-SCNC: 3.9 MMOL/L (ref 3.5–5.1)
PROT SERPL-MCNC: 7.9 G/DL (ref 6–8.4)
RBC # BLD AUTO: 4.67 M/UL (ref 4–5.4)
SODIUM SERPL-SCNC: 145 MMOL/L (ref 136–145)
SODIUM SERPL-SCNC: 145 MMOL/L (ref 136–145)
TRIGL SERPL-MCNC: 104 MG/DL (ref 30–150)
WBC # BLD AUTO: 8.37 K/UL (ref 3.9–12.7)

## 2021-10-19 PROCEDURE — 36415 COLL VENOUS BLD VENIPUNCTURE: CPT | Performed by: FAMILY MEDICINE

## 2021-10-19 PROCEDURE — 80177 DRUG SCRN QUAN LEVETIRACETAM: CPT | Performed by: FAMILY MEDICINE

## 2021-10-19 PROCEDURE — 80053 COMPREHEN METABOLIC PANEL: CPT | Performed by: FAMILY MEDICINE

## 2021-10-19 PROCEDURE — 85025 COMPLETE CBC W/AUTO DIFF WBC: CPT | Performed by: FAMILY MEDICINE

## 2021-10-19 PROCEDURE — 80061 LIPID PANEL: CPT | Performed by: FAMILY MEDICINE

## 2021-10-25 LAB — LEVETIRACETAM SERPL-MCNC: 9.6 UG/ML (ref 10–40)

## 2021-10-26 ENCOUNTER — TELEPHONE (OUTPATIENT)
Dept: FAMILY MEDICINE | Facility: CLINIC | Age: 76
End: 2021-10-26
Payer: COMMERCIAL

## 2021-12-07 ENCOUNTER — OFFICE VISIT (OUTPATIENT)
Dept: FAMILY MEDICINE | Facility: CLINIC | Age: 76
End: 2021-12-07
Payer: COMMERCIAL

## 2021-12-07 VITALS
OXYGEN SATURATION: 96 % | HEART RATE: 74 BPM | TEMPERATURE: 98 F | RESPIRATION RATE: 18 BRPM | WEIGHT: 126.88 LBS | BODY MASS INDEX: 20.39 KG/M2 | SYSTOLIC BLOOD PRESSURE: 118 MMHG | DIASTOLIC BLOOD PRESSURE: 75 MMHG | HEIGHT: 66 IN

## 2021-12-07 DIAGNOSIS — J06.9 UPPER RESPIRATORY TRACT INFECTION, UNSPECIFIED TYPE: Primary | ICD-10-CM

## 2021-12-07 PROCEDURE — 99214 OFFICE O/P EST MOD 30 MIN: CPT | Mod: S$GLB,,, | Performed by: NURSE PRACTITIONER

## 2021-12-07 PROCEDURE — 99214 PR OFFICE/OUTPT VISIT, EST, LEVL IV, 30-39 MIN: ICD-10-PCS | Mod: S$GLB,,, | Performed by: NURSE PRACTITIONER

## 2021-12-07 RX ORDER — METHYLPREDNISOLONE 4 MG/1
TABLET ORAL
Qty: 1 EACH | Refills: 0 | Status: SHIPPED | OUTPATIENT
Start: 2021-12-07 | End: 2021-12-28

## 2021-12-07 RX ORDER — AZITHROMYCIN 250 MG/1
TABLET, FILM COATED ORAL
Qty: 6 TABLET | Refills: 0 | Status: SHIPPED | OUTPATIENT
Start: 2021-12-07 | End: 2021-12-12

## 2022-02-10 ENCOUNTER — OFFICE VISIT (OUTPATIENT)
Dept: FAMILY MEDICINE | Facility: CLINIC | Age: 77
End: 2022-02-10
Payer: COMMERCIAL

## 2022-02-10 VITALS
BODY MASS INDEX: 20.25 KG/M2 | WEIGHT: 126 LBS | HEART RATE: 83 BPM | SYSTOLIC BLOOD PRESSURE: 136 MMHG | OXYGEN SATURATION: 96 % | HEIGHT: 66 IN | DIASTOLIC BLOOD PRESSURE: 80 MMHG

## 2022-02-10 DIAGNOSIS — Z23 IMMUNIZATION DUE: ICD-10-CM

## 2022-02-10 DIAGNOSIS — M25.559 HIP PAIN: Primary | ICD-10-CM

## 2022-02-10 DIAGNOSIS — M19.039 ARTHRITIS, WRIST: ICD-10-CM

## 2022-02-10 PROCEDURE — 1159F PR MEDICATION LIST DOCUMENTED IN MEDICAL RECORD: ICD-10-PCS | Mod: S$GLB,,, | Performed by: FAMILY MEDICINE

## 2022-02-10 PROCEDURE — G0008 FLU VACCINE - QUADRIVALENT - HIGH DOSE (65+) PRESERVATIVE FREE IM: ICD-10-PCS | Mod: S$GLB,,, | Performed by: FAMILY MEDICINE

## 2022-02-10 PROCEDURE — 99214 OFFICE O/P EST MOD 30 MIN: CPT | Mod: 25,S$GLB,, | Performed by: FAMILY MEDICINE

## 2022-02-10 PROCEDURE — G0009 PNEUMOCOCCAL POLYSACCHARIDE VACCINE 23-VALENT =>2YO SQ IM: ICD-10-PCS | Mod: S$GLB,,, | Performed by: FAMILY MEDICINE

## 2022-02-10 PROCEDURE — G0008 ADMIN INFLUENZA VIRUS VAC: HCPCS | Mod: S$GLB,,, | Performed by: FAMILY MEDICINE

## 2022-02-10 PROCEDURE — 3079F PR MOST RECENT DIASTOLIC BLOOD PRESSURE 80-89 MM HG: ICD-10-PCS | Mod: S$GLB,,, | Performed by: FAMILY MEDICINE

## 2022-02-10 PROCEDURE — 90732 PPSV23 VACC 2 YRS+ SUBQ/IM: CPT | Mod: S$GLB,,, | Performed by: FAMILY MEDICINE

## 2022-02-10 PROCEDURE — 90662 FLU VACCINE - QUADRIVALENT - HIGH DOSE (65+) PRESERVATIVE FREE IM: ICD-10-PCS | Mod: S$GLB,,, | Performed by: FAMILY MEDICINE

## 2022-02-10 PROCEDURE — 90732 PNEUMOCOCCAL POLYSACCHARIDE VACCINE 23-VALENT =>2YO SQ IM: ICD-10-PCS | Mod: S$GLB,,, | Performed by: FAMILY MEDICINE

## 2022-02-10 PROCEDURE — 3075F PR MOST RECENT SYSTOLIC BLOOD PRESS GE 130-139MM HG: ICD-10-PCS | Mod: S$GLB,,, | Performed by: FAMILY MEDICINE

## 2022-02-10 PROCEDURE — G0009 ADMIN PNEUMOCOCCAL VACCINE: HCPCS | Mod: S$GLB,,, | Performed by: FAMILY MEDICINE

## 2022-02-10 PROCEDURE — 1159F MED LIST DOCD IN RCRD: CPT | Mod: S$GLB,,, | Performed by: FAMILY MEDICINE

## 2022-02-10 PROCEDURE — 3079F DIAST BP 80-89 MM HG: CPT | Mod: S$GLB,,, | Performed by: FAMILY MEDICINE

## 2022-02-10 PROCEDURE — 99214 PR OFFICE/OUTPT VISIT, EST, LEVL IV, 30-39 MIN: ICD-10-PCS | Mod: 25,S$GLB,, | Performed by: FAMILY MEDICINE

## 2022-02-10 PROCEDURE — 90662 IIV NO PRSV INCREASED AG IM: CPT | Mod: S$GLB,,, | Performed by: FAMILY MEDICINE

## 2022-02-10 PROCEDURE — 3075F SYST BP GE 130 - 139MM HG: CPT | Mod: S$GLB,,, | Performed by: FAMILY MEDICINE

## 2022-02-10 RX ORDER — DICLOFENAC SODIUM 10 MG/G
2 GEL TOPICAL 4 TIMES DAILY
Qty: 200 G | Refills: 5 | Status: SHIPPED | OUTPATIENT
Start: 2022-02-10

## 2022-02-10 NOTE — PROGRESS NOTES
Subjective:       Patient ID: Alondra Pacheco is a 76 y.o. female.    Chief Complaint: Follow-up (Check up with Htn, dementia, seizure, hyperlipidemia)      Here for a follow-up visit- had COVID in January fully recovered.  She was advised to get the booster dose in April which would be when she would be due 90 days after infection.  Lab Results       Component                Value               Date                       WBC                      8.37                10/19/2021                 HGB                      12.5                10/19/2021                 HCT                      40.9                10/19/2021                 PLT                      230                 10/19/2021                 CHOL                     142                 10/19/2021                 TRIG                     104                 10/19/2021                 HDL                      55                  10/19/2021                 ALT                      26                  10/19/2021                 AST                      25                  10/19/2021                 NA                       145                 10/19/2021                 NA                       145                 10/19/2021                 K                        3.9                 10/19/2021                 K                        3.9                 10/19/2021                 CL                       106                 10/19/2021                 CL                       106                 10/19/2021                 CREATININE               0.9                 10/19/2021                 CREATININE               0.9                 10/19/2021                 BUN                      22                  10/19/2021                 BUN                      22                  10/19/2021                 CO2                      28                  10/19/2021                 CO2                      28                  10/19/2021                 TSH                       1.860               04/30/2021                 INR                      1.1                 04/30/2021                 HGBA1C                   5.5                 06/18/2020                  Allergies and Medications:   Review of patient's allergies indicates:   Allergen Reactions    Anesthesia s/i-40 (propofol) [propofol]      Current Outpatient Medications   Medication Sig Dispense Refill    alendronate (FOSAMAX) 70 MG tablet TAKE 1 TABLET(70 MG) BY MOUTH EVERY 7 DAYS 12 tablet 1    atorvastatin (LIPITOR) 80 MG tablet TAKE 1 TABLET(80 MG) BY MOUTH EVERY DAY 90 tablet 1    calcium carbonate (OS-NEETA) 600 mg calcium (1,500 mg) Tab Take 600 mg by mouth every evening.      clopidogreL (PLAVIX) 75 mg tablet TAKE 1 TABLET BY MOUTH EVERY DAY 90 tablet 1    diclofenac sodium (VOLTAREN) 1 % Gel Apply 2 g topically 4 (four) times daily. 200 g 5    donepeziL (ARICEPT) 10 MG tablet Take 1 tablet (10 mg total) by mouth once daily. 90 tablet 1    fenofibrate 160 MG Tab TAKE 1 TABLET(160 MG) BY MOUTH EVERY DAY 90 tablet 1    lamoTRIgine (LAMICTAL) 100 MG tablet Take 100 mg by mouth 2 (two) times daily.       levETIRAcetam (KEPPRA) 500 MG Tab Take 1 tablet (500 mg total) by mouth 2 (two) times daily. (Patient taking differently: Take 250 mg by mouth 2 (two) times daily.) 60 tablet 11    levothyroxine (SYNTHROID) 75 MCG tablet TAKE 1 TABLET(75 MCG) BY MOUTH EVERY DAY 90 tablet 1    losartan (COZAAR) 50 MG tablet TAKE 1 TABLET(50 MG) BY MOUTH EVERY DAY 90 tablet 1    metoprolol tartrate (LOPRESSOR) 25 MG tablet Take 1 tablet (25 mg total) by mouth 2 (two) times daily. 180 tablet 1    multivitamin capsule Take 1 capsule by mouth once daily.      pantoprazole (PROTONIX) 40 MG tablet Take 1 tablet (40 mg total) by mouth once daily. 90 tablet 1    topiramate (TOPAMAX) 50 MG tablet Take 1 tablet (50 mg total) by mouth 2 (two) times daily. 180 tablet 1     No current facility-administered medications for  this visit.       Family History:   Family History   Problem Relation Age of Onset    Cancer Father     Diabetes Sister     Heart attack Paternal Uncle        Social History:   Social History     Socioeconomic History    Marital status:    Tobacco Use    Smoking status: Former Smoker     Packs/day: 1.50     Years: 12.00     Pack years: 18.00     Quit date: 1975     Years since quittin.0    Smokeless tobacco: Never Used   Substance and Sexual Activity    Alcohol use: No    Drug use: No    Sexual activity: Not Currently       Review of Systems    Objective:     Vitals:    02/10/22 1528   BP: 136/80   Pulse: 83        Physical Exam  Musculoskeletal:        Legs:          Assessment:       1. Hip pain    2. Arthritis, wrist    3. Immunization due        Plan:       Alondra was seen today for follow-up.    Diagnoses and all orders for this visit:    Hip pain  -     X-Ray Hip 2 or 3 views Left (with Pelvis when performed); Future    Arthritis, wrist  -     diclofenac sodium (VOLTAREN) 1 % Gel; Apply 2 g topically 4 (four) times daily.    Immunization due  -     Influenza - Quadrivalent - High Dose (65+) (PF) (IM)  -     Pneumococcal Polysaccharide Vaccine (23 Valent) (SQ/IM)         Follow up in about 3 months (around 5/10/2022) for Three months.

## 2022-03-15 ENCOUNTER — HOSPITAL ENCOUNTER (OUTPATIENT)
Dept: RADIOLOGY | Facility: HOSPITAL | Age: 77
Discharge: HOME | End: 2022-03-15
Attending: FAMILY MEDICINE
Payer: COMMERCIAL

## 2022-03-15 DIAGNOSIS — M25.559 HIP PAIN: ICD-10-CM

## 2022-03-15 PROCEDURE — 73502 X-RAY EXAM HIP UNI 2-3 VIEWS: CPT | Mod: TC,LT

## 2022-07-20 ENCOUNTER — OFFICE VISIT (OUTPATIENT)
Dept: FAMILY MEDICINE | Facility: CLINIC | Age: 77
End: 2022-07-20
Payer: COMMERCIAL

## 2022-07-20 VITALS
SYSTOLIC BLOOD PRESSURE: 138 MMHG | HEART RATE: 82 BPM | OXYGEN SATURATION: 95 % | DIASTOLIC BLOOD PRESSURE: 68 MMHG | TEMPERATURE: 98 F | BODY MASS INDEX: 18.22 KG/M2 | RESPIRATION RATE: 16 BRPM | WEIGHT: 112.88 LBS

## 2022-07-20 DIAGNOSIS — R64 CACHEXIA: ICD-10-CM

## 2022-07-20 DIAGNOSIS — D64.9 ANEMIA, UNSPECIFIED TYPE: ICD-10-CM

## 2022-07-20 DIAGNOSIS — E78.5 HYPERLIPIDEMIA, UNSPECIFIED HYPERLIPIDEMIA TYPE: ICD-10-CM

## 2022-07-20 DIAGNOSIS — I12.9 HYPERTENSIVE KIDNEY DISEASE: ICD-10-CM

## 2022-07-20 DIAGNOSIS — N18.31 STAGE 3A CHRONIC KIDNEY DISEASE: ICD-10-CM

## 2022-07-20 DIAGNOSIS — R63.4 WEIGHT LOSS: Primary | ICD-10-CM

## 2022-07-20 DIAGNOSIS — R53.81 DEBILITY: ICD-10-CM

## 2022-07-20 PROCEDURE — 1101F PT FALLS ASSESS-DOCD LE1/YR: CPT | Mod: CPTII,S$GLB,, | Performed by: FAMILY MEDICINE

## 2022-07-20 PROCEDURE — 3075F SYST BP GE 130 - 139MM HG: CPT | Mod: CPTII,S$GLB,, | Performed by: FAMILY MEDICINE

## 2022-07-20 PROCEDURE — 3075F PR MOST RECENT SYSTOLIC BLOOD PRESS GE 130-139MM HG: ICD-10-PCS | Mod: CPTII,S$GLB,, | Performed by: FAMILY MEDICINE

## 2022-07-20 PROCEDURE — 99214 PR OFFICE/OUTPT VISIT, EST, LEVL IV, 30-39 MIN: ICD-10-PCS | Mod: S$GLB,,, | Performed by: FAMILY MEDICINE

## 2022-07-20 PROCEDURE — 3078F DIAST BP <80 MM HG: CPT | Mod: CPTII,S$GLB,, | Performed by: FAMILY MEDICINE

## 2022-07-20 PROCEDURE — 1101F PR PT FALLS ASSESS DOC 0-1 FALLS W/OUT INJ PAST YR: ICD-10-PCS | Mod: CPTII,S$GLB,, | Performed by: FAMILY MEDICINE

## 2022-07-20 PROCEDURE — 3078F PR MOST RECENT DIASTOLIC BLOOD PRESSURE < 80 MM HG: ICD-10-PCS | Mod: CPTII,S$GLB,, | Performed by: FAMILY MEDICINE

## 2022-07-20 PROCEDURE — 3288F FALL RISK ASSESSMENT DOCD: CPT | Mod: CPTII,S$GLB,, | Performed by: FAMILY MEDICINE

## 2022-07-20 PROCEDURE — 3288F PR FALLS RISK ASSESSMENT DOCUMENTED: ICD-10-PCS | Mod: CPTII,S$GLB,, | Performed by: FAMILY MEDICINE

## 2022-07-20 PROCEDURE — 99214 OFFICE O/P EST MOD 30 MIN: CPT | Mod: S$GLB,,, | Performed by: FAMILY MEDICINE

## 2022-07-20 RX ORDER — LEVETIRACETAM 250 MG/1
TABLET ORAL
COMMUNITY
Start: 2022-07-08

## 2022-07-20 RX ORDER — CYPROHEPTADINE HYDROCHLORIDE 4 MG/1
4 TABLET ORAL NIGHTLY
Qty: 30 TABLET | Refills: 11 | Status: SHIPPED | OUTPATIENT
Start: 2022-07-20 | End: 2022-07-20

## 2022-07-20 RX ORDER — METOPROLOL SUCCINATE 25 MG/1
25 TABLET, EXTENDED RELEASE ORAL DAILY
COMMUNITY
Start: 2022-05-19 | End: 2022-11-28 | Stop reason: SDUPTHER

## 2022-07-20 NOTE — PATIENT INSTRUCTIONS
Patient was instructed to do weekly weights at home and record notify us if she dropped off significantly.

## 2022-07-20 NOTE — PROGRESS NOTES
Patient is here for six-month follow-up.  Patient Active Problem List   Diagnosis    Stroke    Failed total right knee replacement    Dementia    Stage 3 chronic kidney disease    Hyperlipidemia    Hypertension, essential    Hypertensive kidney disease    Seizure disorder    TIA (transient ischemic attack)    Tinea cruris    Urinary tract infection with hematuria    Status epilepticus    Seizure    Cachexia                 Lab Results   Component Value Date    WBC 8.37 10/19/2021    HGB 12.5 10/19/2021    HCT 40.9 10/19/2021     10/19/2021    CHOL 142 10/19/2021    TRIG 104 10/19/2021    HDL 55 10/19/2021    ALT 26 10/19/2021    AST 25 10/19/2021     10/19/2021     10/19/2021    K 3.9 10/19/2021    K 3.9 10/19/2021     10/19/2021     10/19/2021    CREATININE 0.9 10/19/2021    CREATININE 0.9 10/19/2021    BUN 22 10/19/2021    BUN 22 10/19/2021    CO2 28 10/19/2021    CO2 28 10/19/2021    TSH 1.860 04/30/2021    INR 1.1 04/30/2021    HGBA1C 5.5 06/18/2020     Subjective:       Patient ID: Alondra Pacheco is a 76 y.o. female.    Chief Complaint: Hyperlipidemia, Hypertension, and Dementia      Patient is here to follow-up on chronic problems hypertension hyperlipidemia she has some concerns about dizziness and is followed by another doctor who need reports on her MRI and carotid arteries.  BP Readings from Last 3 Encounters:  07/20/22 : 138/68  02/10/22 : 136/80  12/07/21 : 118/75  Blood pressure has been stable saw the cardiologist last week.  Wt Readings from Last 3 Encounters:  07/20/22 : 51.2 kg (112 lb 14.4 oz)  02/10/22 : 57.2 kg (126 lb)  12/07/21 : 57.6 kg (126 lb 14.4 oz)        Hyperlipidemia    Hypertension        Allergies and Medications:   Review of patient's allergies indicates:   Allergen Reactions    Anesthesia s/i-40 (propofol) [propofol]      Current Outpatient Medications   Medication Sig Dispense Refill    alendronate (FOSAMAX) 70 MG tablet TAKE 1  TABLET(70 MG) BY MOUTH EVERY 7 DAYS 12 tablet 4    atorvastatin (LIPITOR) 80 MG tablet TAKE ONE TABLET BY MOUTH EVERY DAY 90 tablet 1    calcium carbonate (OS-NEETA) 600 mg calcium (1,500 mg) Tab Take 600 mg by mouth every evening.      clopidogreL (PLAVIX) 75 mg tablet TAKE 1 TABLET BY MOUTH EVERY DAY 90 tablet 1    diclofenac sodium (VOLTAREN) 1 % Gel Apply 2 g topically 4 (four) times daily. 200 g 5    donepeziL (ARICEPT) 10 MG tablet TAKE 1 TABLET BY MOUTH EVERY DAY 90 tablet 1    fenofibrate 160 MG Tab TAKE 1 TABLET(160 MG) BY MOUTH EVERY DAY 90 tablet 1    lamoTRIgine (LAMICTAL) 100 MG tablet Take 100 mg by mouth 2 (two) times daily.       levETIRAcetam (KEPPRA) 250 MG Tab SMARTSI Tablet(s) By Mouth Every 12 Hours      levothyroxine (SYNTHROID) 75 MCG tablet TAKE 1 TABLET(75 MCG) BY MOUTH EVERY DAY 90 tablet 1    losartan (COZAAR) 50 MG tablet TAKE ONE TABLET BY MOUTH EVERY DAY 90 tablet 1    metoprolol succinate (TOPROL-XL) 25 MG 24 hr tablet Take 25 mg by mouth once daily.      metoprolol tartrate (LOPRESSOR) 25 MG tablet Take 1 tablet (25 mg total) by mouth 2 (two) times daily. 180 tablet 1    multivitamin capsule Take 1 capsule by mouth once daily.      pantoprazole (PROTONIX) 40 MG tablet Take 1 tablet (40 mg total) by mouth once daily. 90 tablet 1    cyproheptadine (PERIACTIN) 4 mg tablet Take 1 tablet (4 mg total) by mouth every evening. 30 tablet 11     No current facility-administered medications for this visit.       Family History:   Family History   Problem Relation Age of Onset    Cancer Father     Diabetes Sister     Heart attack Paternal Uncle        Social History:   Social History     Socioeconomic History    Marital status:    Tobacco Use    Smoking status: Former Smoker     Packs/day: 1.50     Years: 12.00     Pack years: 18.00     Quit date: 1975     Years since quittin.4    Smokeless tobacco: Never Used   Substance and Sexual Activity    Alcohol  use: No    Drug use: No    Sexual activity: Not Currently       Review of Systems    Objective:     Vitals:    07/20/22 1044   BP: 138/68   Pulse: 82   Resp: 16   Temp: 98.4 °F (36.9 °C)        Physical Exam  Vitals and nursing note reviewed.   Constitutional:       Appearance: She is well-developed.   HENT:      Head: Normocephalic and atraumatic.   Eyes:      Pupils: Pupils are equal, round, and reactive to light.   Cardiovascular:      Rate and Rhythm: Normal rate and regular rhythm.      Heart sounds: Normal heart sounds. No murmur heard.    No friction rub. No gallop.   Pulmonary:      Effort: Pulmonary effort is normal. No respiratory distress.      Breath sounds: Normal breath sounds. No stridor. No wheezing, rhonchi or rales.   Chest:      Chest wall: No tenderness.   Psychiatric:         Behavior: Behavior normal.         Thought Content: Thought content normal.         Judgment: Judgment normal.         Assessment:       1. Weight loss    2. Cachexia    3. Stage 3a chronic kidney disease    4. Hypertensive kidney disease    5. Hyperlipidemia, unspecified hyperlipidemia type    6. Debility    7. Anemia, unspecified type        Plan:       Alondra was seen today for hyperlipidemia, hypertension and dementia.    Diagnoses and all orders for this visit:    Weight loss  -     cyproheptadine (PERIACTIN) 4 mg tablet; Take 1 tablet (4 mg total) by mouth every evening.  -     TSH; Future    Cachexia  -     Lipid Panel; Future  -     Comprehensive Metabolic Panel; Future  -     TSH; Future    Stage 3a chronic kidney disease  -     Prealbumin; Future    Hypertensive kidney disease    Hyperlipidemia, unspecified hyperlipidemia type    Debility    Anemia, unspecified type  -     CBC Auto Differential; Future         Follow up in about 3 months (around 10/20/2022).

## 2022-07-22 RX ORDER — CYPROHEPTADINE HYDROCHLORIDE 4 MG/1
4 TABLET ORAL NIGHTLY
Qty: 30 TABLET | Refills: 11 | Status: SHIPPED | OUTPATIENT
Start: 2022-07-22 | End: 2023-08-25

## 2022-08-01 ENCOUNTER — TELEPHONE (OUTPATIENT)
Dept: FAMILY MEDICINE | Facility: CLINIC | Age: 77
End: 2022-08-01

## 2022-08-01 ENCOUNTER — LAB VISIT (OUTPATIENT)
Dept: LAB | Facility: HOSPITAL | Age: 77
End: 2022-08-01
Attending: FAMILY MEDICINE
Payer: COMMERCIAL

## 2022-08-01 DIAGNOSIS — R63.4 WEIGHT LOSS: ICD-10-CM

## 2022-08-01 DIAGNOSIS — D64.9 ANEMIA, UNSPECIFIED TYPE: ICD-10-CM

## 2022-08-01 DIAGNOSIS — N18.31 STAGE 3A CHRONIC KIDNEY DISEASE: ICD-10-CM

## 2022-08-01 DIAGNOSIS — R64 CACHEXIA: ICD-10-CM

## 2022-08-01 LAB
ALBUMIN SERPL BCP-MCNC: 3.6 G/DL (ref 3.5–5.2)
ALP SERPL-CCNC: 60 U/L (ref 55–135)
ALT SERPL W/O P-5'-P-CCNC: 33 U/L (ref 10–44)
ANION GAP SERPL CALC-SCNC: 8 MMOL/L (ref 8–16)
AST SERPL-CCNC: 31 U/L (ref 10–40)
BASOPHILS # BLD AUTO: 0.08 K/UL (ref 0–0.2)
BASOPHILS NFR BLD: 1 % (ref 0–1.9)
BILIRUB SERPL-MCNC: 0.7 MG/DL (ref 0.1–1)
BUN SERPL-MCNC: 22 MG/DL (ref 8–23)
CALCIUM SERPL-MCNC: 9.2 MG/DL (ref 8.7–10.5)
CHLORIDE SERPL-SCNC: 101 MMOL/L (ref 95–110)
CHOLEST SERPL-MCNC: 146 MG/DL (ref 120–199)
CHOLEST/HDLC SERPL: 2.6 {RATIO} (ref 2–5)
CO2 SERPL-SCNC: 28 MMOL/L (ref 23–29)
CREAT SERPL-MCNC: 0.9 MG/DL (ref 0.5–1.4)
DIFFERENTIAL METHOD: ABNORMAL
EOSINOPHIL # BLD AUTO: 0.1 K/UL (ref 0–0.5)
EOSINOPHIL NFR BLD: 1.7 % (ref 0–8)
ERYTHROCYTE [DISTWIDTH] IN BLOOD BY AUTOMATED COUNT: 15.2 % (ref 11.5–14.5)
EST. GFR  (NO RACE VARIABLE): >60 ML/MIN/1.73 M^2
GLUCOSE SERPL-MCNC: 129 MG/DL (ref 70–110)
HCT VFR BLD AUTO: 36.9 % (ref 37–48.5)
HDLC SERPL-MCNC: 57 MG/DL (ref 40–75)
HDLC SERPL: 39 % (ref 20–50)
HGB BLD-MCNC: 11.5 G/DL (ref 12–16)
IMM GRANULOCYTES # BLD AUTO: 0.02 K/UL (ref 0–0.04)
IMM GRANULOCYTES NFR BLD AUTO: 0.3 % (ref 0–0.5)
LDLC SERPL CALC-MCNC: 71.4 MG/DL (ref 63–159)
LYMPHOCYTES # BLD AUTO: 2.6 K/UL (ref 1–4.8)
LYMPHOCYTES NFR BLD: 33.1 % (ref 18–48)
MCH RBC QN AUTO: 26.7 PG (ref 27–31)
MCHC RBC AUTO-ENTMCNC: 31.2 G/DL (ref 32–36)
MCV RBC AUTO: 86 FL (ref 82–98)
MONOCYTES # BLD AUTO: 0.5 K/UL (ref 0.3–1)
MONOCYTES NFR BLD: 6.8 % (ref 4–15)
NEUTROPHILS # BLD AUTO: 4.5 K/UL (ref 1.8–7.7)
NEUTROPHILS NFR BLD: 57.1 % (ref 38–73)
NONHDLC SERPL-MCNC: 89 MG/DL
NRBC BLD-RTO: 0 /100 WBC
PLATELET # BLD AUTO: 234 K/UL (ref 150–450)
PMV BLD AUTO: 11.4 FL (ref 9.2–12.9)
POTASSIUM SERPL-SCNC: 3.5 MMOL/L (ref 3.5–5.1)
PREALB SERPL-MCNC: 23 MG/DL (ref 20–43)
PROT SERPL-MCNC: 7 G/DL (ref 6–8.4)
RBC # BLD AUTO: 4.31 M/UL (ref 4–5.4)
SODIUM SERPL-SCNC: 137 MMOL/L (ref 136–145)
TRIGL SERPL-MCNC: 88 MG/DL (ref 30–150)
TSH SERPL DL<=0.005 MIU/L-ACNC: 2.29 UIU/ML (ref 0.34–5.6)
WBC # BLD AUTO: 7.79 K/UL (ref 3.9–12.7)

## 2022-08-01 PROCEDURE — 85025 COMPLETE CBC W/AUTO DIFF WBC: CPT | Performed by: FAMILY MEDICINE

## 2022-08-01 PROCEDURE — 84134 ASSAY OF PREALBUMIN: CPT | Performed by: FAMILY MEDICINE

## 2022-08-01 PROCEDURE — 84443 ASSAY THYROID STIM HORMONE: CPT | Performed by: FAMILY MEDICINE

## 2022-08-01 PROCEDURE — 80061 LIPID PANEL: CPT | Performed by: FAMILY MEDICINE

## 2022-08-01 PROCEDURE — 80053 COMPREHEN METABOLIC PANEL: CPT | Performed by: FAMILY MEDICINE

## 2022-08-04 ENCOUNTER — TELEPHONE (OUTPATIENT)
Dept: FAMILY MEDICINE | Facility: CLINIC | Age: 77
End: 2022-08-04

## 2022-08-04 NOTE — TELEPHONE ENCOUNTER
The following medication needs a prior authorization:     Medication Name: Cyproheptadine HCl    Dosage: 4mg    Frequency: every evening    Directions for use: 1 po every evening    Diagnosis: R63.4 weight loss     Is the request for a reauthorization? no    Is the patient currently stable on therapy? no    Please list all therapeutic alternatives previously used with start/end dates and outcome:  Patient will have quality of life while on this medication.   In response to questions patient is not stable she continues to lose weight and needs cyproheptadine to be to prevent further cachexia.  Please notice a BMI has dropped to 18.2, she has become anemic, pre albumin is on the low end of normal as well.

## 2022-08-04 NOTE — TELEPHONE ENCOUNTER
In response to questions patient is not stable she continues to lose weight and needs cyproheptadine to be to prevent further cachexia.  Please notice a BMI has dropped to 18.2, she has become anemic, pre albumin is on the low end of normal as well.

## 2022-08-11 ENCOUNTER — TELEPHONE (OUTPATIENT)
Dept: FAMILY MEDICINE | Facility: CLINIC | Age: 77
End: 2022-08-11

## 2022-08-11 NOTE — TELEPHONE ENCOUNTER
Cyproheptadine is denied for this patient after prior authorization attempt. No alternates given.

## 2022-08-31 DIAGNOSIS — F02.80 DEMENTIA ASSOCIATED WITH OTHER UNDERLYING DISEASE WITHOUT BEHAVIORAL DISTURBANCE: ICD-10-CM

## 2022-08-31 RX ORDER — DONEPEZIL HYDROCHLORIDE 10 MG/1
TABLET, FILM COATED ORAL
Qty: 90 TABLET | Refills: 2 | Status: SHIPPED | OUTPATIENT
Start: 2022-08-31 | End: 2023-05-05

## 2022-10-27 ENCOUNTER — OFFICE VISIT (OUTPATIENT)
Dept: FAMILY MEDICINE | Facility: CLINIC | Age: 77
End: 2022-10-27
Payer: COMMERCIAL

## 2022-10-27 VITALS
HEART RATE: 64 BPM | RESPIRATION RATE: 18 BRPM | BODY MASS INDEX: 18.58 KG/M2 | SYSTOLIC BLOOD PRESSURE: 100 MMHG | DIASTOLIC BLOOD PRESSURE: 62 MMHG | HEIGHT: 66 IN | OXYGEN SATURATION: 99 % | WEIGHT: 115.63 LBS | TEMPERATURE: 98 F

## 2022-10-27 DIAGNOSIS — E78.5 HYPERLIPIDEMIA, UNSPECIFIED HYPERLIPIDEMIA TYPE: ICD-10-CM

## 2022-10-27 DIAGNOSIS — F02.B0 MODERATE DEMENTIA ASSOCIATED WITH OTHER UNDERLYING DISEASE, WITHOUT BEHAVIORAL DISTURBANCE, PSYCHOTIC DISTURBANCE, MOOD DISTURBANCE, OR ANXIETY: Primary | ICD-10-CM

## 2022-10-27 DIAGNOSIS — N18.31 STAGE 3A CHRONIC KIDNEY DISEASE: ICD-10-CM

## 2022-10-27 DIAGNOSIS — I10 HYPERTENSION, ESSENTIAL: ICD-10-CM

## 2022-10-27 PROCEDURE — 3074F PR MOST RECENT SYSTOLIC BLOOD PRESSURE < 130 MM HG: ICD-10-PCS | Mod: CPTII,S$GLB,, | Performed by: FAMILY MEDICINE

## 2022-10-27 PROCEDURE — 3288F FALL RISK ASSESSMENT DOCD: CPT | Mod: CPTII,S$GLB,, | Performed by: FAMILY MEDICINE

## 2022-10-27 PROCEDURE — 3288F PR FALLS RISK ASSESSMENT DOCUMENTED: ICD-10-PCS | Mod: CPTII,S$GLB,, | Performed by: FAMILY MEDICINE

## 2022-10-27 PROCEDURE — 1101F PR PT FALLS ASSESS DOC 0-1 FALLS W/OUT INJ PAST YR: ICD-10-PCS | Mod: CPTII,S$GLB,, | Performed by: FAMILY MEDICINE

## 2022-10-27 PROCEDURE — 1159F PR MEDICATION LIST DOCUMENTED IN MEDICAL RECORD: ICD-10-PCS | Mod: CPTII,S$GLB,, | Performed by: FAMILY MEDICINE

## 2022-10-27 PROCEDURE — 1101F PT FALLS ASSESS-DOCD LE1/YR: CPT | Mod: CPTII,S$GLB,, | Performed by: FAMILY MEDICINE

## 2022-10-27 PROCEDURE — 1159F MED LIST DOCD IN RCRD: CPT | Mod: CPTII,S$GLB,, | Performed by: FAMILY MEDICINE

## 2022-10-27 PROCEDURE — 3074F SYST BP LT 130 MM HG: CPT | Mod: CPTII,S$GLB,, | Performed by: FAMILY MEDICINE

## 2022-10-27 PROCEDURE — 3078F PR MOST RECENT DIASTOLIC BLOOD PRESSURE < 80 MM HG: ICD-10-PCS | Mod: CPTII,S$GLB,, | Performed by: FAMILY MEDICINE

## 2022-10-27 PROCEDURE — 99213 PR OFFICE/OUTPT VISIT, EST, LEVL III, 20-29 MIN: ICD-10-PCS | Mod: S$GLB,,, | Performed by: FAMILY MEDICINE

## 2022-10-27 PROCEDURE — 3078F DIAST BP <80 MM HG: CPT | Mod: CPTII,S$GLB,, | Performed by: FAMILY MEDICINE

## 2022-10-27 PROCEDURE — 99213 OFFICE O/P EST LOW 20 MIN: CPT | Mod: S$GLB,,, | Performed by: FAMILY MEDICINE

## 2022-10-27 NOTE — PROGRESS NOTES
Subjective:       Patient ID: Alondra Pacheco is a 77 y.o. female.    Chief Complaint: mental status (F/u)      Patient is here as a follow-up from last visit altered mental status and cachexia.  She has taken Periactin nightly and has put on some weight since then.  Wt Readings from Last 4 Encounters:  10/27/22 : 52.4 kg (115 lb 9.6 oz)  22 : 51.2 kg (112 lb 14.4 oz)  02/10/22 : 57.2 kg (126 lb)  21 : 57.6 kg (126 lb 14.4 oz)  Improved appitite.  Mental status stable.            Allergies and Medications:   Review of patient's allergies indicates:   Allergen Reactions    Anesthesia s/i-40 (propofol) [propofol]      Current Outpatient Medications   Medication Sig Dispense Refill    alendronate (FOSAMAX) 70 MG tablet TAKE 1 TABLET(70 MG) BY MOUTH EVERY 7 DAYS 12 tablet 4    atorvastatin (LIPITOR) 80 MG tablet TAKE ONE TABLET BY MOUTH EVERY DAY 90 tablet 1    calcium carbonate (OS-NEETA) 600 mg calcium (1,500 mg) Tab Take 600 mg by mouth every evening.      clopidogreL (PLAVIX) 75 mg tablet TAKE 1 TABLET BY MOUTH EVERY DAY 90 tablet 1    cyproheptadine (PERIACTIN) 4 mg tablet Take 1 tablet (4 mg total) by mouth every evening. 30 tablet 11    diclofenac sodium (VOLTAREN) 1 % Gel Apply 2 g topically 4 (four) times daily. 200 g 5    donepeziL (ARICEPT) 10 MG tablet TAKE ONE TABLET BY MOUTH EVERY DAY 90 tablet 2    fenofibrate 160 MG Tab TAKE 1 TABLET(160 MG) BY MOUTH EVERY DAY 90 tablet 1    lamoTRIgine (LAMICTAL) 100 MG tablet Take 100 mg by mouth 2 (two) times daily.       levETIRAcetam (KEPPRA) 250 MG Tab SMARTSI Tablet(s) By Mouth Every 12 Hours      levothyroxine (SYNTHROID) 75 MCG tablet TAKE ONE TABLET BY MOUTH EVERY DAY 90 tablet 1    losartan (COZAAR) 50 MG tablet TAKE ONE TABLET BY MOUTH EVERY DAY 90 tablet 1    metoprolol succinate (TOPROL-XL) 25 MG 24 hr tablet Take 25 mg by mouth once daily.      metoprolol tartrate (LOPRESSOR) 25 MG tablet Take 1 tablet (25 mg total) by mouth 2 (two) times  daily. 180 tablet 1    multivitamin capsule Take 1 capsule by mouth once daily.      pantoprazole (PROTONIX) 40 MG tablet Take 1 tablet (40 mg total) by mouth once daily. 90 tablet 1     No current facility-administered medications for this visit.       Family History:   Family History   Problem Relation Age of Onset    Cancer Father     Diabetes Sister     Heart attack Paternal Uncle        Social History:   Social History     Socioeconomic History    Marital status:    Tobacco Use    Smoking status: Former     Packs/day: 1.50     Years: 12.00     Pack years: 18.00     Types: Cigarettes     Quit date: 1975     Years since quittin.7    Smokeless tobacco: Never   Substance and Sexual Activity    Alcohol use: No    Drug use: No    Sexual activity: Not Currently       Review of Systems    Objective:     Vitals:    10/27/22 1518   BP: 100/62   Pulse: 64   Resp: 18   Temp: 98.1 °F (36.7 °C)        Physical Exam  Constitutional:       Appearance: Normal appearance. She is normal weight.   Neck:      Vascular: No carotid bruit.   Cardiovascular:      Rate and Rhythm: Normal rate and regular rhythm.   Musculoskeletal:         General: No swelling, tenderness, deformity or signs of injury.      Cervical back: Normal range of motion and neck supple. No rigidity or tenderness.      Right lower leg: No edema.      Left lower leg: No edema.   Lymphadenopathy:      Cervical: No cervical adenopathy.   Neurological:      Mental Status: She is alert.       Assessment:       1. Moderate dementia associated with other underlying disease, without behavioral disturbance, psychotic disturbance, mood disturbance, or anxiety    2. Hypertension, essential    3. Hyperlipidemia, unspecified hyperlipidemia type    4. Stage 3a chronic kidney disease        Plan:       Alondra was seen today for mental status.    Diagnoses and all orders for this visit:    Moderate dementia associated with other underlying disease, without  behavioral disturbance, psychotic disturbance, mood disturbance, or anxiety    Hypertension, essential    Hyperlipidemia, unspecified hyperlipidemia type    Stage 3a chronic kidney disease       Follow up in about 3 months (around 1/27/2023).

## 2022-11-17 RX ORDER — LEVOTHYROXINE SODIUM 75 UG/1
TABLET ORAL
Qty: 90 TABLET | Refills: 1 | OUTPATIENT
Start: 2022-11-17

## 2022-11-28 DIAGNOSIS — I10 HYPERTENSION, ESSENTIAL: Primary | ICD-10-CM

## 2022-11-28 RX ORDER — METOPROLOL SUCCINATE 25 MG/1
25 TABLET, EXTENDED RELEASE ORAL DAILY
Qty: 90 TABLET | Refills: 1 | Status: SHIPPED | OUTPATIENT
Start: 2022-11-28 | End: 2023-02-27

## 2023-01-27 ENCOUNTER — OFFICE VISIT (OUTPATIENT)
Dept: FAMILY MEDICINE | Facility: CLINIC | Age: 78
End: 2023-01-27
Payer: COMMERCIAL

## 2023-01-27 VITALS
HEART RATE: 89 BPM | WEIGHT: 120 LBS | OXYGEN SATURATION: 99 % | BODY MASS INDEX: 19.29 KG/M2 | SYSTOLIC BLOOD PRESSURE: 130 MMHG | TEMPERATURE: 100 F | RESPIRATION RATE: 18 BRPM | DIASTOLIC BLOOD PRESSURE: 76 MMHG | HEIGHT: 66 IN

## 2023-01-27 DIAGNOSIS — Z23 IMMUNIZATION DUE: Primary | ICD-10-CM

## 2023-01-27 DIAGNOSIS — I10 HYPERTENSION, ESSENTIAL: ICD-10-CM

## 2023-01-27 DIAGNOSIS — E78.5 HYPERLIPIDEMIA, UNSPECIFIED HYPERLIPIDEMIA TYPE: ICD-10-CM

## 2023-01-27 DIAGNOSIS — Z13.820 SCREENING FOR OSTEOPOROSIS: ICD-10-CM

## 2023-01-27 DIAGNOSIS — M81.0 OSTEOPOROSIS, UNSPECIFIED OSTEOPOROSIS TYPE, UNSPECIFIED PATHOLOGICAL FRACTURE PRESENCE: ICD-10-CM

## 2023-01-27 PROCEDURE — 90662 IIV NO PRSV INCREASED AG IM: CPT | Mod: S$GLB,,, | Performed by: FAMILY MEDICINE

## 2023-01-27 PROCEDURE — 99214 OFFICE O/P EST MOD 30 MIN: CPT | Mod: 25,S$GLB,, | Performed by: FAMILY MEDICINE

## 2023-01-27 PROCEDURE — G0008 FLU VACCINE - QUADRIVALENT - HIGH DOSE (65+) PRESERVATIVE FREE IM: ICD-10-PCS | Mod: S$GLB,,, | Performed by: FAMILY MEDICINE

## 2023-01-27 PROCEDURE — 1101F PT FALLS ASSESS-DOCD LE1/YR: CPT | Mod: CPTII,S$GLB,, | Performed by: FAMILY MEDICINE

## 2023-01-27 PROCEDURE — 1126F PR PAIN SEVERITY QUANTIFIED, NO PAIN PRESENT: ICD-10-PCS | Mod: CPTII,S$GLB,, | Performed by: FAMILY MEDICINE

## 2023-01-27 PROCEDURE — 3288F FALL RISK ASSESSMENT DOCD: CPT | Mod: CPTII,S$GLB,, | Performed by: FAMILY MEDICINE

## 2023-01-27 PROCEDURE — G0008 ADMIN INFLUENZA VIRUS VAC: HCPCS | Mod: S$GLB,,, | Performed by: FAMILY MEDICINE

## 2023-01-27 PROCEDURE — 3078F DIAST BP <80 MM HG: CPT | Mod: CPTII,S$GLB,, | Performed by: FAMILY MEDICINE

## 2023-01-27 PROCEDURE — 3288F PR FALLS RISK ASSESSMENT DOCUMENTED: ICD-10-PCS | Mod: CPTII,S$GLB,, | Performed by: FAMILY MEDICINE

## 2023-01-27 PROCEDURE — 90662 FLU VACCINE - QUADRIVALENT - HIGH DOSE (65+) PRESERVATIVE FREE IM: ICD-10-PCS | Mod: S$GLB,,, | Performed by: FAMILY MEDICINE

## 2023-01-27 PROCEDURE — 1126F AMNT PAIN NOTED NONE PRSNT: CPT | Mod: CPTII,S$GLB,, | Performed by: FAMILY MEDICINE

## 2023-01-27 PROCEDURE — 3075F SYST BP GE 130 - 139MM HG: CPT | Mod: CPTII,S$GLB,, | Performed by: FAMILY MEDICINE

## 2023-01-27 PROCEDURE — 1159F PR MEDICATION LIST DOCUMENTED IN MEDICAL RECORD: ICD-10-PCS | Mod: CPTII,S$GLB,, | Performed by: FAMILY MEDICINE

## 2023-01-27 PROCEDURE — 1159F MED LIST DOCD IN RCRD: CPT | Mod: CPTII,S$GLB,, | Performed by: FAMILY MEDICINE

## 2023-01-27 PROCEDURE — 1101F PR PT FALLS ASSESS DOC 0-1 FALLS W/OUT INJ PAST YR: ICD-10-PCS | Mod: CPTII,S$GLB,, | Performed by: FAMILY MEDICINE

## 2023-01-27 PROCEDURE — 3075F PR MOST RECENT SYSTOLIC BLOOD PRESS GE 130-139MM HG: ICD-10-PCS | Mod: CPTII,S$GLB,, | Performed by: FAMILY MEDICINE

## 2023-01-27 PROCEDURE — 3078F PR MOST RECENT DIASTOLIC BLOOD PRESSURE < 80 MM HG: ICD-10-PCS | Mod: CPTII,S$GLB,, | Performed by: FAMILY MEDICINE

## 2023-01-27 PROCEDURE — 99214 PR OFFICE/OUTPT VISIT, EST, LEVL IV, 30-39 MIN: ICD-10-PCS | Mod: 25,S$GLB,, | Performed by: FAMILY MEDICINE

## 2023-01-27 RX ORDER — ALENDRONATE SODIUM 70 MG/1
TABLET ORAL
COMMUNITY

## 2023-01-27 RX ORDER — CALCIUM CARBONATE 600 MG
TABLET ORAL
COMMUNITY
End: 2023-07-28

## 2023-01-27 RX ORDER — TRAZODONE HYDROCHLORIDE 150 MG/1
TABLET ORAL
COMMUNITY
End: 2023-07-28

## 2023-01-27 NOTE — PROGRESS NOTES
Subjective:       Patient ID: Alondra Pacheco is a 77 y.o. female.    Chief Complaint: Hypertension and Hyperlipidemia      Patient is here to follow-up on cholesterol and blood pressure.  Not checking blood pressure regularly at home.  BP Readings from Last 3 Encounters:  01/27/23 : 130/76  10/27/22 : 100/62  07/20/22 : 138/68  Lab Results       Component                Value               Date                       WBC                      7.79                08/01/2022                 HGB                      11.5 (L)            08/01/2022                 HCT                      36.9 (L)            08/01/2022                 PLT                      234                 08/01/2022                 CHOL                     146                 08/01/2022                 TRIG                     88                  08/01/2022                 HDL                      57                  08/01/2022                 ALT                      33                  08/01/2022                 AST                      31                  08/01/2022                 NA                       137                 08/01/2022                 K                        3.5                 08/01/2022                 CL                       101                 08/01/2022                 CREATININE               0.9                 08/01/2022                 BUN                      22                  08/01/2022                 CO2                      28                  08/01/2022                 TSH                      2.290               08/01/2022                 INR                      1.1                 04/30/2021                 HGBA1C                   5.4                 08/01/2022                    Hypertension  This is a chronic problem. The problem is unchanged. The problem is controlled.   Hyperlipidemia      Allergies and Medications:   Review of patient's allergies indicates:   Allergen Reactions    Anesthesia  s/i-40 (propofol) [propofol]      Current Outpatient Medications   Medication Sig Dispense Refill    alendronate (FOSAMAX) 70 MG tablet TAKE 1 TABLET(70 MG) BY MOUTH EVERY 7 DAYS 12 tablet 4    alendronate (FOSAMAX) 70 MG tablet 1 tablet 30 minutes before the first food, beverage or medicine of the day with plain water      atorvastatin (LIPITOR) 80 MG tablet TAKE ONE TABLET BY MOUTH EVERY DAY 90 tablet 1    calcium carbonate (OS-NEETA) 600 mg calcium (1,500 mg) Tab Take 600 mg by mouth every evening.      calcium carbonate (OS-NEETA) 600 mg calcium (1,500 mg) Tab 1 tablet with meals      clopidogreL (PLAVIX) 75 mg tablet TAKE 1 TABLET BY MOUTH EVERY DAY 90 tablet 1    cyproheptadine (PERIACTIN) 4 mg tablet Take 1 tablet (4 mg total) by mouth every evening. 30 tablet 11    diclofenac sodium (VOLTAREN) 1 % Gel Apply 2 g topically 4 (four) times daily. 200 g 5    donepeziL (ARICEPT) 10 MG tablet TAKE ONE TABLET BY MOUTH EVERY DAY 90 tablet 2    lamoTRIgine (LAMICTAL) 100 MG tablet Take 100 mg by mouth 2 (two) times daily.       levETIRAcetam (KEPPRA) 250 MG Tab SMARTSI Tablet(s) By Mouth Every 12 Hours      levothyroxine (SYNTHROID) 75 MCG tablet TAKE ONE TABLET BY MOUTH EVERY DAY 90 tablet 1    losartan (COZAAR) 50 MG tablet TAKE ONE TABLET BY MOUTH EVERY DAY 90 tablet 1    metoprolol succinate (TOPROL-XL) 25 MG 24 hr tablet Take 1 tablet (25 mg total) by mouth once daily. 90 tablet 1    metoprolol tartrate (LOPRESSOR) 25 MG tablet Take 1 tablet (25 mg total) by mouth 2 (two) times daily. 180 tablet 1    multivitamin capsule Take 1 capsule by mouth once daily.      pantoprazole (PROTONIX) 40 MG tablet Take 1 tablet (40 mg total) by mouth once daily. 90 tablet 1    traZODone (DESYREL) 150 MG tablet as directed       No current facility-administered medications for this visit.       Family History:   Family History   Problem Relation Age of Onset    Cancer Father     Diabetes Sister     Heart attack Paternal Uncle         Social History:   Social History     Socioeconomic History    Marital status:    Tobacco Use    Smoking status: Former     Packs/day: 1.50     Years: 12.00     Pack years: 18.00     Types: Cigarettes     Quit date: 1975     Years since quittin.0    Smokeless tobacco: Never   Substance and Sexual Activity    Alcohol use: No    Drug use: No    Sexual activity: Not Currently       Review of Systems    Objective:     Vitals:    23 1543   BP: 130/76   Pulse: 89   Resp: 18   Temp: 99.9 °F (37.7 °C)        Physical Exam  Vitals and nursing note reviewed.   Constitutional:       General: She is not in acute distress.     Appearance: Normal appearance. She is well-developed and normal weight. She is not ill-appearing, toxic-appearing or diaphoretic.   HENT:      Head: Normocephalic and atraumatic.   Eyes:      Pupils: Pupils are equal, round, and reactive to light.   Cardiovascular:      Rate and Rhythm: Normal rate and regular rhythm.      Heart sounds: Normal heart sounds. No murmur heard.    No friction rub. No gallop.   Pulmonary:      Effort: Pulmonary effort is normal. No respiratory distress.      Breath sounds: Normal breath sounds. No stridor. No wheezing, rhonchi or rales.   Chest:      Chest wall: No tenderness.   Musculoskeletal:      Right lower leg: No edema.      Left lower leg: No edema.   Neurological:      Mental Status: She is alert.   Psychiatric:         Behavior: Behavior normal.         Thought Content: Thought content normal.         Judgment: Judgment normal.       Assessment:       1. Immunization due    2. Hypertension, essential    3. Hyperlipidemia, unspecified hyperlipidemia type    4. Screening for osteoporosis    5. Osteoporosis, unspecified osteoporosis type, unspecified pathological fracture presence        Plan:       Alondra was seen today for hypertension and hyperlipidemia.    Diagnoses and all orders for this visit:    Immunization due  -     Influenza -  Quadrivalent - High Dose (65+) (PF) (IM)    Hypertension, essential  -     Comprehensive Metabolic Panel; Future    Hyperlipidemia, unspecified hyperlipidemia type  -     Lipid Panel; Future    Screening for osteoporosis    Osteoporosis, unspecified osteoporosis type, unspecified pathological fracture presence  -     DXA Bone Density Spine And Hip; Future         Follow up in about 6 months (around 7/27/2023).

## 2023-02-08 ENCOUNTER — TELEPHONE (OUTPATIENT)
Dept: FAMILY MEDICINE | Facility: CLINIC | Age: 78
End: 2023-02-08

## 2023-02-08 NOTE — TELEPHONE ENCOUNTER
----- Message from Chantal Mcguire sent at 2/7/2023 12:38 PM CST -----  Regarding: Unable to Contact  We have made multiple attempts to contact this pt to  schedule their DXA Bone Density and have been unable to reach them therefore we are removing this from our work queue. We just wanted to make you aware of this in case you wanted to reach out to the patient.     Thanks,   [facility] Centralized Scheduling

## 2023-02-27 DIAGNOSIS — E03.9 HYPOTHYROIDISM, UNSPECIFIED TYPE: Primary | ICD-10-CM

## 2023-02-27 RX ORDER — LEVOTHYROXINE SODIUM 75 UG/1
75 TABLET ORAL DAILY
Qty: 90 TABLET | Refills: 1 | Status: SHIPPED | OUTPATIENT
Start: 2023-02-27 | End: 2023-08-09

## 2023-05-05 DIAGNOSIS — F02.80 DEMENTIA ASSOCIATED WITH OTHER UNDERLYING DISEASE WITHOUT BEHAVIORAL DISTURBANCE: ICD-10-CM

## 2023-05-05 RX ORDER — DONEPEZIL HYDROCHLORIDE 10 MG/1
TABLET, FILM COATED ORAL
Qty: 90 TABLET | Refills: 2 | Status: SHIPPED | OUTPATIENT
Start: 2023-05-05

## 2023-05-15 RX ORDER — LOSARTAN POTASSIUM 50 MG/1
TABLET ORAL
Qty: 90 TABLET | Refills: 1 | Status: SHIPPED | OUTPATIENT
Start: 2023-05-15

## 2023-06-09 DIAGNOSIS — E78.5 HYPERLIPIDEMIA, UNSPECIFIED HYPERLIPIDEMIA TYPE: ICD-10-CM

## 2023-06-09 RX ORDER — ATORVASTATIN CALCIUM 80 MG/1
TABLET, FILM COATED ORAL
Qty: 90 TABLET | Refills: 1 | Status: SHIPPED | OUTPATIENT
Start: 2023-06-09

## 2023-07-19 DIAGNOSIS — M81.0 OSTEOPOROSIS, UNSPECIFIED OSTEOPOROSIS TYPE, UNSPECIFIED PATHOLOGICAL FRACTURE PRESENCE: Primary | ICD-10-CM

## 2023-07-19 RX ORDER — ALENDRONATE SODIUM 70 MG/1
TABLET ORAL
Qty: 12 TABLET | Refills: 3 | Status: SHIPPED | OUTPATIENT
Start: 2023-07-19 | End: 2023-07-28

## 2023-07-21 ENCOUNTER — TELEPHONE (OUTPATIENT)
Dept: FAMILY MEDICINE | Facility: CLINIC | Age: 78
End: 2023-07-21

## 2023-07-24 ENCOUNTER — TELEPHONE (OUTPATIENT)
Dept: FAMILY MEDICINE | Facility: CLINIC | Age: 78
End: 2023-07-24

## 2023-07-24 ENCOUNTER — HOSPITAL ENCOUNTER (OUTPATIENT)
Dept: RADIOLOGY | Facility: HOSPITAL | Age: 78
Discharge: HOME | End: 2023-07-24
Attending: FAMILY MEDICINE
Payer: COMMERCIAL

## 2023-07-24 DIAGNOSIS — M81.0 OSTEOPOROSIS, UNSPECIFIED OSTEOPOROSIS TYPE, UNSPECIFIED PATHOLOGICAL FRACTURE PRESENCE: ICD-10-CM

## 2023-07-24 PROCEDURE — 77080 DXA BONE DENSITY AXIAL: CPT | Mod: TC,PO

## 2023-07-24 NOTE — TELEPHONE ENCOUNTER
----- Message from Jak Park MD sent at 7/24/2023 11:36 AM CDT -----  Lab reviewed: all OK. Please notify pt. Continue pres meds.

## 2023-07-25 NOTE — PROGRESS NOTES
There is still osteopenia at the hip but the bone density has increased in both hip and lumbar spine continue present meds and will recheck it in 2 years

## 2023-07-26 ENCOUNTER — TELEPHONE (OUTPATIENT)
Dept: FAMILY MEDICINE | Facility: CLINIC | Age: 78
End: 2023-07-26

## 2023-07-26 NOTE — TELEPHONE ENCOUNTER
----- Message from Jak Park MD sent at 7/25/2023  8:03 AM CDT -----  There is still osteopenia at the hip but the bone density has increased in both hip and lumbar spine continue present meds and will recheck it in 2 years

## 2023-07-28 ENCOUNTER — OFFICE VISIT (OUTPATIENT)
Dept: FAMILY MEDICINE | Facility: CLINIC | Age: 78
End: 2023-07-28
Payer: COMMERCIAL

## 2023-07-28 VITALS
RESPIRATION RATE: 18 BRPM | DIASTOLIC BLOOD PRESSURE: 74 MMHG | TEMPERATURE: 99 F | HEART RATE: 55 BPM | WEIGHT: 125 LBS | BODY MASS INDEX: 20.09 KG/M2 | OXYGEN SATURATION: 98 % | SYSTOLIC BLOOD PRESSURE: 130 MMHG | HEIGHT: 66 IN

## 2023-07-28 DIAGNOSIS — I12.9 HYPERTENSIVE KIDNEY DISEASE: Primary | ICD-10-CM

## 2023-07-28 DIAGNOSIS — E78.5 HYPERLIPIDEMIA, UNSPECIFIED HYPERLIPIDEMIA TYPE: ICD-10-CM

## 2023-07-28 DIAGNOSIS — G40.909 SEIZURE DISORDER: ICD-10-CM

## 2023-07-28 DIAGNOSIS — G45.9 TIA (TRANSIENT ISCHEMIC ATTACK): ICD-10-CM

## 2023-07-28 DIAGNOSIS — F02.B0 MODERATE DEMENTIA ASSOCIATED WITH OTHER UNDERLYING DISEASE, WITHOUT BEHAVIORAL DISTURBANCE, PSYCHOTIC DISTURBANCE, MOOD DISTURBANCE, OR ANXIETY: ICD-10-CM

## 2023-07-28 PROCEDURE — 1159F PR MEDICATION LIST DOCUMENTED IN MEDICAL RECORD: ICD-10-PCS | Mod: CPTII,S$GLB,, | Performed by: FAMILY MEDICINE

## 2023-07-28 PROCEDURE — 99213 PR OFFICE/OUTPT VISIT, EST, LEVL III, 20-29 MIN: ICD-10-PCS | Mod: S$GLB,,, | Performed by: FAMILY MEDICINE

## 2023-07-28 PROCEDURE — 3078F PR MOST RECENT DIASTOLIC BLOOD PRESSURE < 80 MM HG: ICD-10-PCS | Mod: CPTII,S$GLB,, | Performed by: FAMILY MEDICINE

## 2023-07-28 PROCEDURE — 99213 OFFICE O/P EST LOW 20 MIN: CPT | Mod: S$GLB,,, | Performed by: FAMILY MEDICINE

## 2023-07-28 PROCEDURE — 3075F SYST BP GE 130 - 139MM HG: CPT | Mod: CPTII,S$GLB,, | Performed by: FAMILY MEDICINE

## 2023-07-28 PROCEDURE — 1126F PR PAIN SEVERITY QUANTIFIED, NO PAIN PRESENT: ICD-10-PCS | Mod: CPTII,S$GLB,, | Performed by: FAMILY MEDICINE

## 2023-07-28 PROCEDURE — 3078F DIAST BP <80 MM HG: CPT | Mod: CPTII,S$GLB,, | Performed by: FAMILY MEDICINE

## 2023-07-28 PROCEDURE — 1126F AMNT PAIN NOTED NONE PRSNT: CPT | Mod: CPTII,S$GLB,, | Performed by: FAMILY MEDICINE

## 2023-07-28 PROCEDURE — 1159F MED LIST DOCD IN RCRD: CPT | Mod: CPTII,S$GLB,, | Performed by: FAMILY MEDICINE

## 2023-07-28 PROCEDURE — 3075F PR MOST RECENT SYSTOLIC BLOOD PRESS GE 130-139MM HG: ICD-10-PCS | Mod: CPTII,S$GLB,, | Performed by: FAMILY MEDICINE

## 2023-07-28 RX ORDER — CALCIUM CARBONATE 600 MG
TABLET ORAL
COMMUNITY

## 2023-07-28 RX ORDER — FENOFIBRATE 160 MG/1
TABLET ORAL
COMMUNITY

## 2023-07-28 NOTE — PROGRESS NOTES
Subjective:       Patient ID: Alondra Pacheco is a 78 y.o. female.    Chief Complaint: Hypertension and Hyperlipidemia      Patient is here for follow-up on cholesterol and hypertension.  Lab Results       Component                Value               Date                       WBC                      7.79                08/01/2022                 HGB                      11.5 (L)            08/01/2022                 HCT                      36.9 (L)            08/01/2022                 PLT                      234                 08/01/2022                 CHOL                     159                 07/24/2023                 TRIG                     76                  07/24/2023                 HDL                      54                  07/24/2023                 ALT                      32                  07/24/2023                 AST                      32                  07/24/2023                 NA                       142                 07/24/2023                 K                        4.0                 07/24/2023                 CL                       106                 07/24/2023                 CREATININE               1.0                 07/24/2023                 BUN                      24 (H)              07/24/2023                 CO2                      30 (H)              07/24/2023                 TSH                      2.290               08/01/2022                 INR                      1.1                 04/30/2021                 HGBA1C                   5.4                 08/01/2022            Patient is sleeping up to 16 hours today because a combination of anti seizure medicines.  Wt Readings from Last 4 Encounters:  07/28/23 : 56.7 kg (125 lb)  01/27/23 : 54.4 kg (120 lb)  10/27/22 : 52.4 kg (115 lb 9.6 oz)  07/20/22 : 51.2 kg (112 lb 14.4 oz)  Slight deterioration of her memory and speech.        Hypertension  This is a chronic problem. The problem is  unchanged. The problem is controlled. Pertinent negatives include no anxiety, blurred vision, chest pain or headaches. Agents associated with hypertension include amphetamines and anorectics. Risk factors for coronary artery disease include family history. Past treatments include nothing. The current treatment provides no improvement. There is no history of angina, kidney disease, CAD/MI, CVA, heart failure, left ventricular hypertrophy, PVD or retinopathy. There is no history of chronic renal disease, coarctation of the aorta, hyperaldosteronism, hypercortisolism, hyperparathyroidism, a hypertension causing med, pheochromocytoma, renovascular disease, sleep apnea or a thyroid problem.   Hyperlipidemia  This is a chronic problem. This is a new diagnosis. The problem is controlled. Recent lipid tests were reviewed and are normal. She has no history of chronic renal disease or diabetes. Pertinent negatives include no chest pain or focal sensory loss.     Allergies and Medications:   Review of patient's allergies indicates:   Allergen Reactions    Anesthesia s/i-40 (propofol) [propofol]      Current Outpatient Medications   Medication Sig Dispense Refill    alendronate (FOSAMAX) 70 MG tablet TAKE 1 TABLET(70 MG) BY MOUTH EVERY 7 DAYS 12 tablet 4    atorvastatin (LIPITOR) 80 MG tablet TAKE ONE TABLET BY MOUTH EVERY DAY 90 tablet 1    calcium carbonate (OS-NEETA) 600 mg calcium (1,500 mg) Tab Take 600 mg by mouth every evening.      clopidogreL (PLAVIX) 75 mg tablet TAKE 1 TABLET BY MOUTH EVERY DAY 90 tablet 1    diclofenac sodium (VOLTAREN) 1 % Gel Apply 2 g topically 4 (four) times daily. 200 g 5    donepeziL (ARICEPT) 10 MG tablet TAKE ONE TABLET BY MOUTH EVERY DAY 90 tablet 2    fenofibrate 160 MG Tab 1 tablet with food Orally Once a day for 30 day(s)      lamoTRIgine (LAMICTAL) 100 MG tablet Take 100 mg by mouth 2 (two) times daily.       levETIRAcetam (KEPPRA) 250 MG Tab SMARTSI Tablet(s) By Mouth Every 12 Hours       levothyroxine (SYNTHROID) 75 MCG tablet Take 1 tablet (75 mcg total) by mouth once daily. 90 tablet 1    losartan (COZAAR) 50 MG tablet TAKE ONE TABLET BY MOUTH EVERY DAY 90 tablet 1    metoprolol succinate (TOPROL-XL) 25 MG 24 hr tablet TAKE ONE TABLET BY MOUTH ONCE DAILY 90 tablet 1    multivitamin capsule Take 1 capsule by mouth once daily.      pantoprazole (PROTONIX) 40 MG tablet Take 1 tablet (40 mg total) by mouth once daily. 90 tablet 1    alendronate (FOSAMAX) 70 MG tablet 1 tablet 30 minutes before the first food, beverage or medicine of the day with plain water      calcium carbonate (OS-NEETA) 600 mg calcium (1,500 mg) Tab 1 tablet with meals      calcium carbonate (OS-NEETA) 600 mg calcium (1,500 mg) Tab 1 tablet with meals Orally Once a day      metoprolol tartrate (LOPRESSOR) 25 MG tablet Take 1 tablet (25 mg total) by mouth 2 (two) times daily. (Patient not taking: Reported on 2023) 180 tablet 1     No current facility-administered medications for this visit.       Family History:   Family History   Problem Relation Age of Onset    Cancer Father     Diabetes Sister     Heart attack Paternal Uncle        Social History:   Social History     Socioeconomic History    Marital status:    Tobacco Use    Smoking status: Former     Packs/day: 1.50     Years: 12.00     Pack years: 18.00     Types: Cigarettes     Quit date: 1975     Years since quittin.5    Smokeless tobacco: Never   Substance and Sexual Activity    Alcohol use: No    Drug use: No    Sexual activity: Not Currently       Review of Systems   Eyes:  Negative for blurred vision.   Cardiovascular:  Negative for chest pain.   Neurological:  Negative for headaches.     Objective:     Vitals:    23 1600   BP: 130/74   Pulse: (!) 55   Resp: 18   Temp: 98.7 °F (37.1 °C)        Physical Exam  Vitals and nursing note reviewed.   Constitutional:       General: She is not in acute distress.     Appearance: Normal appearance. She  is well-developed and normal weight. She is not ill-appearing, toxic-appearing or diaphoretic.   HENT:      Head: Normocephalic and atraumatic.   Eyes:      Pupils: Pupils are equal, round, and reactive to light.   Cardiovascular:      Rate and Rhythm: Normal rate and regular rhythm.      Heart sounds: Normal heart sounds. No murmur heard.    No friction rub. No gallop.   Pulmonary:      Effort: Pulmonary effort is normal. No respiratory distress.      Breath sounds: Normal breath sounds. No stridor. No wheezing, rhonchi or rales.   Chest:      Chest wall: No tenderness.   Musculoskeletal:      Right lower leg: No edema.      Left lower leg: No edema.   Neurological:      Mental Status: She is alert.   Psychiatric:         Behavior: Behavior normal.         Thought Content: Thought content normal.         Judgment: Judgment normal.       Assessment:       1. Hypertensive kidney disease    2. Hyperlipidemia, unspecified hyperlipidemia type    3. 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 TIA (transient ischemic attack)    5. Seizure disorder        Plan:       Alondra was seen today for hypertension and hyperlipidemia.    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

## 2023-08-09 DIAGNOSIS — E03.9 HYPOTHYROIDISM, UNSPECIFIED TYPE: ICD-10-CM

## 2023-08-09 RX ORDER — LEVOTHYROXINE SODIUM 75 UG/1
75 TABLET ORAL
Qty: 90 TABLET | Refills: 1 | Status: SHIPPED | OUTPATIENT
Start: 2023-08-09

## 2023-08-25 DIAGNOSIS — R63.4 WEIGHT LOSS: ICD-10-CM

## 2023-08-25 RX ORDER — CYPROHEPTADINE HYDROCHLORIDE 4 MG/1
4 TABLET ORAL NIGHTLY
Qty: 90 TABLET | Refills: 3 | Status: SHIPPED | OUTPATIENT
Start: 2023-08-25 | End: 2023-12-28 | Stop reason: SDUPTHER

## 2023-08-28 ENCOUNTER — LAB VISIT (OUTPATIENT)
Dept: LAB | Facility: HOSPITAL | Age: 78
End: 2023-08-28
Attending: NURSE PRACTITIONER
Payer: COMMERCIAL

## 2023-08-28 DIAGNOSIS — R56.9 GENERALIZED-ONSET SEIZURES: Primary | ICD-10-CM

## 2023-08-28 PROCEDURE — 80177 DRUG SCRN QUAN LEVETIRACETAM: CPT | Performed by: NURSE PRACTITIONER

## 2023-08-28 PROCEDURE — 36415 COLL VENOUS BLD VENIPUNCTURE: CPT | Performed by: NURSE PRACTITIONER

## 2023-08-28 PROCEDURE — 80175 DRUG SCREEN QUAN LAMOTRIGINE: CPT | Performed by: NURSE PRACTITIONER

## 2023-08-30 LAB
LAMOTRIGINE SERPL-MCNC: 2.8 UG/ML (ref 2–20)
LEVETIRACETAM SERPL-MCNC: 5.1 UG/ML (ref 10–40)

## 2023-10-24 DIAGNOSIS — I10 HYPERTENSION, ESSENTIAL: ICD-10-CM

## 2023-10-24 RX ORDER — METOPROLOL SUCCINATE 25 MG/1
TABLET, EXTENDED RELEASE ORAL
Qty: 90 TABLET | Refills: 1 | Status: SHIPPED | OUTPATIENT
Start: 2023-10-24

## 2023-12-28 DIAGNOSIS — R63.4 WEIGHT LOSS: ICD-10-CM

## 2023-12-28 RX ORDER — CYPROHEPTADINE HYDROCHLORIDE 4 MG/1
4 TABLET ORAL NIGHTLY
Qty: 90 TABLET | Refills: 3 | Status: SHIPPED | OUTPATIENT
Start: 2023-12-28

## 2024-02-01 ENCOUNTER — LAB VISIT (OUTPATIENT)
Dept: LAB | Facility: HOSPITAL | Age: 79
End: 2024-02-01
Attending: INTERNAL MEDICINE
Payer: COMMERCIAL

## 2024-02-01 DIAGNOSIS — E87.20 ACIDOSIS: ICD-10-CM

## 2024-02-01 DIAGNOSIS — E11.00 TYPE II DIABETES MELLITUS WITH HYPEROSMOLARITY, UNCONTROLLED: ICD-10-CM

## 2024-02-01 DIAGNOSIS — E87.8 DILATED CARDIOMYOPATHY SECONDARY TO ELECTROLYTE DEFICIENCY: Primary | ICD-10-CM

## 2024-02-01 DIAGNOSIS — I51.9 MYXEDEMA HEART DISEASE: ICD-10-CM

## 2024-02-01 DIAGNOSIS — D64.9 ANEMIA, UNSPECIFIED: ICD-10-CM

## 2024-02-01 DIAGNOSIS — I43 DILATED CARDIOMYOPATHY SECONDARY TO ELECTROLYTE DEFICIENCY: Primary | ICD-10-CM

## 2024-02-01 DIAGNOSIS — E03.9 MYXEDEMA HEART DISEASE: ICD-10-CM

## 2024-02-01 LAB
CHOLEST SERPL-MCNC: 255 MG/DL (ref 120–199)
CHOLEST/HDLC SERPL: 3.8 {RATIO} (ref 2–5)
ESTIMATED AVG GLUCOSE: 114 MG/DL (ref 68–131)
HBA1C MFR BLD: 5.6 % (ref 4.5–6.2)
HDLC SERPL-MCNC: 67 MG/DL (ref 40–75)
HDLC SERPL: 26.3 % (ref 20–50)
LDLC SERPL CALC-MCNC: 155 MG/DL (ref 63–159)
NONHDLC SERPL-MCNC: 188 MG/DL
TRIGL SERPL-MCNC: 165 MG/DL (ref 30–150)
TSH SERPL DL<=0.005 MIU/L-ACNC: 2.39 UIU/ML (ref 0.34–5.6)

## 2024-02-01 PROCEDURE — 84443 ASSAY THYROID STIM HORMONE: CPT | Performed by: INTERNAL MEDICINE

## 2024-02-01 PROCEDURE — 80061 LIPID PANEL: CPT | Performed by: INTERNAL MEDICINE

## 2024-02-01 PROCEDURE — 83036 HEMOGLOBIN GLYCOSYLATED A1C: CPT | Performed by: INTERNAL MEDICINE

## 2024-08-03 PROBLEM — S72.001A CLOSED RIGHT HIP FRACTURE, INITIAL ENCOUNTER: Status: ACTIVE | Noted: 2024-08-03

## 2024-08-03 PROBLEM — Z01.818 PRE-OPERATIVE CLEARANCE: Status: ACTIVE | Noted: 2024-08-03

## 2024-08-04 PROBLEM — R33.9 URINARY RETENTION: Status: ACTIVE | Noted: 2024-08-04

## 2024-08-04 PROBLEM — Z01.818 PRE-OPERATIVE CLEARANCE: Status: RESOLVED | Noted: 2024-08-03 | Resolved: 2024-08-04

## 2024-08-10 ENCOUNTER — APPOINTMENT (OUTPATIENT)
Dept: LAB | Facility: HOSPITAL | Age: 79
End: 2024-08-10
Attending: FAMILY MEDICINE
Payer: MEDICARE

## 2024-08-10 DIAGNOSIS — R41.82 ALTERED MENTAL STATUS: Primary | ICD-10-CM

## 2024-08-10 LAB
BACTERIA #/AREA URNS HPF: ABNORMAL /HPF
BILIRUB UR QL STRIP: NEGATIVE
CLARITY UR: ABNORMAL
COLOR UR: YELLOW
GLUCOSE UR QL STRIP: NEGATIVE
HGB UR QL STRIP: ABNORMAL
HYALINE CASTS #/AREA URNS LPF: 1 /LPF
KETONES UR QL STRIP: NEGATIVE
LEUKOCYTE ESTERASE UR QL STRIP: ABNORMAL
MICROSCOPIC COMMENT: ABNORMAL
NITRITE UR QL STRIP: POSITIVE
PH UR STRIP: 7 [PH] (ref 5–8)
PROT UR QL STRIP: ABNORMAL
RBC #/AREA URNS HPF: 13 /HPF (ref 0–4)
SP GR UR STRIP: 1.02 (ref 1–1.03)
SQUAMOUS #/AREA URNS HPF: 0 /HPF
URN SPEC COLLECT METH UR: ABNORMAL
UROBILINOGEN UR STRIP-ACNC: NEGATIVE EU/DL
WBC #/AREA URNS HPF: >100 /HPF (ref 0–5)

## 2024-08-10 PROCEDURE — 81000 URINALYSIS NONAUTO W/SCOPE: CPT | Performed by: FAMILY MEDICINE

## 2024-08-10 PROCEDURE — 87186 SC STD MICRODIL/AGAR DIL: CPT | Performed by: FAMILY MEDICINE

## 2024-08-10 PROCEDURE — 87086 URINE CULTURE/COLONY COUNT: CPT | Performed by: FAMILY MEDICINE

## 2024-08-12 LAB — BACTERIA UR CULT: ABNORMAL

## 2024-08-16 DIAGNOSIS — S72.144A CLOSED NONDISPLACED INTERTROCHANTERIC FRACTURE OF RIGHT FEMUR, INITIAL ENCOUNTER: Primary | ICD-10-CM

## 2024-08-20 ENCOUNTER — OFFICE VISIT (OUTPATIENT)
Dept: ORTHOPEDICS | Facility: CLINIC | Age: 79
End: 2024-08-20
Payer: MEDICARE

## 2024-08-20 ENCOUNTER — HOSPITAL ENCOUNTER (OUTPATIENT)
Dept: RADIOLOGY | Facility: HOSPITAL | Age: 79
Discharge: HOME | End: 2024-08-20
Attending: ORTHOPAEDIC SURGERY
Payer: MEDICARE

## 2024-08-20 DIAGNOSIS — S72.144A CLOSED NONDISPLACED INTERTROCHANTERIC FRACTURE OF RIGHT FEMUR, INITIAL ENCOUNTER: Primary | ICD-10-CM

## 2024-08-20 DIAGNOSIS — S72.144A CLOSED NONDISPLACED INTERTROCHANTERIC FRACTURE OF RIGHT FEMUR, INITIAL ENCOUNTER: ICD-10-CM

## 2024-08-20 PROCEDURE — 3288F FALL RISK ASSESSMENT DOCD: CPT | Mod: CPTII,S$GLB,, | Performed by: ORTHOPAEDIC SURGERY

## 2024-08-20 PROCEDURE — 99999 PR PBB SHADOW E&M-EST. PATIENT-LVL II: CPT | Mod: PBBFAC,,, | Performed by: ORTHOPAEDIC SURGERY

## 2024-08-20 PROCEDURE — 99024 POSTOP FOLLOW-UP VISIT: CPT | Mod: S$GLB,,, | Performed by: ORTHOPAEDIC SURGERY

## 2024-08-20 PROCEDURE — 73552 X-RAY EXAM OF FEMUR 2/>: CPT | Mod: 26,RT,, | Performed by: RADIOLOGY

## 2024-08-20 PROCEDURE — 1157F ADVNC CARE PLAN IN RCRD: CPT | Mod: CPTII,S$GLB,, | Performed by: ORTHOPAEDIC SURGERY

## 2024-08-20 PROCEDURE — 1160F RVW MEDS BY RX/DR IN RCRD: CPT | Mod: CPTII,S$GLB,, | Performed by: ORTHOPAEDIC SURGERY

## 2024-08-20 PROCEDURE — 73552 X-RAY EXAM OF FEMUR 2/>: CPT | Mod: TC,PO,RT

## 2024-08-20 PROCEDURE — 1159F MED LIST DOCD IN RCRD: CPT | Mod: CPTII,S$GLB,, | Performed by: ORTHOPAEDIC SURGERY

## 2024-08-20 PROCEDURE — 1100F PTFALLS ASSESS-DOCD GE2>/YR: CPT | Mod: CPTII,S$GLB,, | Performed by: ORTHOPAEDIC SURGERY

## 2024-08-20 PROCEDURE — 1125F AMNT PAIN NOTED PAIN PRSNT: CPT | Mod: CPTII,S$GLB,, | Performed by: ORTHOPAEDIC SURGERY

## 2024-08-29 NOTE — PROGRESS NOTES
Chief Complaint   Patient presents with    Right Femur - Post-op Evaluation     IM IVONE R FEMUR 8/3/24       HPI:  79 y.o. returns to clinic today status post  right femur IMN 2 weeks ago. Pain is mild. Patient is compliant most of the time with restrictions.     R hip    Incision healed. No erythema or fluctuance. Overall normal alignment. Mild point TTP about the fracture site. Decreased ROM due to stiffness. Compartments soft. Skin intact. NVI distally.      X-rays were performed today, personally reviewed by me and findings discussed with the patient.  2 views of the right hip show hardware intact in good position    Closed nondisplaced intertrochanteric fracture of right femur, initial encounter        Staples out. Continue PT. RTC 6 weeks

## 2024-09-25 DIAGNOSIS — Z00.00 ENCOUNTER FOR MEDICARE ANNUAL WELLNESS EXAM: ICD-10-CM

## 2024-09-27 DIAGNOSIS — S72.144A CLOSED NONDISPLACED INTERTROCHANTERIC FRACTURE OF RIGHT FEMUR, INITIAL ENCOUNTER: Primary | ICD-10-CM

## 2024-10-30 ENCOUNTER — PATIENT OUTREACH (OUTPATIENT)
Dept: FAMILY MEDICINE | Facility: CLINIC | Age: 79
End: 2024-10-30
Payer: MEDICARE

## 2024-11-16 ENCOUNTER — HOSPITAL ENCOUNTER (INPATIENT)
Facility: HOSPITAL | Age: 79
LOS: 6 days | Discharge: HOME HEALTH | DRG: 683 | End: 2024-11-22
Attending: EMERGENCY MEDICINE | Admitting: INTERNAL MEDICINE
Payer: MEDICARE

## 2024-11-16 DIAGNOSIS — R62.7 FAILURE TO THRIVE IN ADULT: ICD-10-CM

## 2024-11-16 DIAGNOSIS — N18.9 ACUTE KIDNEY INJURY SUPERIMPOSED ON CHRONIC KIDNEY DISEASE: Primary | ICD-10-CM

## 2024-11-16 DIAGNOSIS — E83.52 HYPERCALCEMIA: ICD-10-CM

## 2024-11-16 DIAGNOSIS — N17.9 AKI (ACUTE KIDNEY INJURY): ICD-10-CM

## 2024-11-16 DIAGNOSIS — R07.9 CHEST PAIN: ICD-10-CM

## 2024-11-16 DIAGNOSIS — E86.0 DEHYDRATION: ICD-10-CM

## 2024-11-16 DIAGNOSIS — E87.6 HYPOKALEMIA: ICD-10-CM

## 2024-11-16 DIAGNOSIS — N17.9 ACUTE KIDNEY INJURY SUPERIMPOSED ON CHRONIC KIDNEY DISEASE: Primary | ICD-10-CM

## 2024-11-16 PROBLEM — T84.012A FAILED TOTAL RIGHT KNEE REPLACEMENT: Status: RESOLVED | Noted: 2019-12-05 | Resolved: 2024-11-16

## 2024-11-16 PROBLEM — E87.0 HYPERNATREMIA: Status: ACTIVE | Noted: 2024-11-16

## 2024-11-16 LAB
ALBUMIN SERPL BCP-MCNC: 3.9 G/DL (ref 3.5–5.2)
ALP SERPL-CCNC: 65 U/L (ref 55–135)
ALT SERPL W/O P-5'-P-CCNC: 10 U/L (ref 10–44)
ANION GAP SERPL CALC-SCNC: 9 MMOL/L (ref 8–16)
AST SERPL-CCNC: 19 U/L (ref 10–40)
BASOPHILS # BLD AUTO: 0.05 K/UL (ref 0–0.2)
BASOPHILS NFR BLD: 0.5 % (ref 0–1.9)
BILIRUB SERPL-MCNC: 0.5 MG/DL (ref 0.1–1)
BILIRUB UR QL STRIP: NEGATIVE
BUN SERPL-MCNC: 33 MG/DL (ref 8–23)
CALCIUM SERPL-MCNC: 12.1 MG/DL (ref 8.7–10.5)
CHLORIDE SERPL-SCNC: 105 MMOL/L (ref 95–110)
CLARITY UR: CLEAR
CO2 SERPL-SCNC: 33 MMOL/L (ref 23–29)
COLOR UR: YELLOW
CREAT SERPL-MCNC: 1.5 MG/DL (ref 0.5–1.4)
DIFFERENTIAL METHOD BLD: ABNORMAL
EOSINOPHIL # BLD AUTO: 0.1 K/UL (ref 0–0.5)
EOSINOPHIL NFR BLD: 1.5 % (ref 0–8)
ERYTHROCYTE [DISTWIDTH] IN BLOOD BY AUTOMATED COUNT: 14.9 % (ref 11.5–14.5)
EST. GFR  (NO RACE VARIABLE): 34.8 ML/MIN/1.73 M^2
GLUCOSE SERPL-MCNC: 99 MG/DL (ref 70–110)
GLUCOSE UR QL STRIP: NEGATIVE
HCT VFR BLD AUTO: 39.1 % (ref 37–48.5)
HGB BLD-MCNC: 12.2 G/DL (ref 12–16)
HGB UR QL STRIP: NORMAL
IMM GRANULOCYTES # BLD AUTO: 0.02 K/UL (ref 0–0.04)
IMM GRANULOCYTES NFR BLD AUTO: 0.2 % (ref 0–0.5)
KETONES UR QL STRIP: NEGATIVE
LEUKOCYTE ESTERASE UR QL STRIP: NEGATIVE
LYMPHOCYTES # BLD AUTO: 2 K/UL (ref 1–4.8)
LYMPHOCYTES NFR BLD: 21.9 % (ref 18–48)
MAGNESIUM SERPL-MCNC: 2 MG/DL (ref 1.6–2.6)
MCH RBC QN AUTO: 26.9 PG (ref 27–31)
MCHC RBC AUTO-ENTMCNC: 31.2 G/DL (ref 32–36)
MCV RBC AUTO: 86 FL (ref 82–98)
MONOCYTES # BLD AUTO: 0.7 K/UL (ref 0.3–1)
MONOCYTES NFR BLD: 7.3 % (ref 4–15)
NEUTROPHILS # BLD AUTO: 6.4 K/UL (ref 1.8–7.7)
NEUTROPHILS NFR BLD: 68.6 % (ref 38–73)
NITRITE UR QL STRIP: NEGATIVE
NRBC BLD-RTO: 0 /100 WBC
PH UR STRIP: 7 [PH] (ref 5–8)
PHOSPHATE SERPL-MCNC: 2.8 MG/DL (ref 2.7–4.5)
PLATELET # BLD AUTO: 227 K/UL (ref 150–450)
PMV BLD AUTO: 12.2 FL (ref 9.2–12.9)
POTASSIUM SERPL-SCNC: 3 MMOL/L (ref 3.5–5.1)
PROT SERPL-MCNC: 7.7 G/DL (ref 6–8.4)
PROT UR QL STRIP: NEGATIVE
RBC # BLD AUTO: 4.54 M/UL (ref 4–5.4)
SARS-COV-2 RDRP RESP QL NAA+PROBE: NEGATIVE
SODIUM SERPL-SCNC: 147 MMOL/L (ref 136–145)
SP GR UR STRIP: 1.01 (ref 1–1.03)
TSH SERPL DL<=0.005 MIU/L-ACNC: 1.39 UIU/ML (ref 0.34–5.6)
URN SPEC COLLECT METH UR: NORMAL
UROBILINOGEN UR STRIP-ACNC: NEGATIVE EU/DL
WBC # BLD AUTO: 9.27 K/UL (ref 3.9–12.7)

## 2024-11-16 PROCEDURE — 84443 ASSAY THYROID STIM HORMONE: CPT | Performed by: EMERGENCY MEDICINE

## 2024-11-16 PROCEDURE — 81003 URINALYSIS AUTO W/O SCOPE: CPT | Performed by: EMERGENCY MEDICINE

## 2024-11-16 PROCEDURE — 80053 COMPREHEN METABOLIC PANEL: CPT | Performed by: EMERGENCY MEDICINE

## 2024-11-16 PROCEDURE — 87086 URINE CULTURE/COLONY COUNT: CPT | Performed by: EMERGENCY MEDICINE

## 2024-11-16 PROCEDURE — 63600175 PHARM REV CODE 636 W HCPCS: Performed by: INTERNAL MEDICINE

## 2024-11-16 PROCEDURE — 84100 ASSAY OF PHOSPHORUS: CPT | Performed by: EMERGENCY MEDICINE

## 2024-11-16 PROCEDURE — 25000003 PHARM REV CODE 250: Performed by: NURSE PRACTITIONER

## 2024-11-16 PROCEDURE — 12000002 HC ACUTE/MED SURGE SEMI-PRIVATE ROOM

## 2024-11-16 PROCEDURE — 83735 ASSAY OF MAGNESIUM: CPT | Performed by: EMERGENCY MEDICINE

## 2024-11-16 PROCEDURE — 25000003 PHARM REV CODE 250: Performed by: INTERNAL MEDICINE

## 2024-11-16 PROCEDURE — 25000003 PHARM REV CODE 250: Performed by: EMERGENCY MEDICINE

## 2024-11-16 PROCEDURE — 96360 HYDRATION IV INFUSION INIT: CPT

## 2024-11-16 PROCEDURE — 99285 EMERGENCY DEPT VISIT HI MDM: CPT | Mod: 25

## 2024-11-16 PROCEDURE — 63600175 PHARM REV CODE 636 W HCPCS: Performed by: NURSE PRACTITIONER

## 2024-11-16 PROCEDURE — 96361 HYDRATE IV INFUSION ADD-ON: CPT

## 2024-11-16 PROCEDURE — 87635 SARS-COV-2 COVID-19 AMP PRB: CPT | Performed by: EMERGENCY MEDICINE

## 2024-11-16 PROCEDURE — 85025 COMPLETE CBC W/AUTO DIFF WBC: CPT | Performed by: EMERGENCY MEDICINE

## 2024-11-16 RX ORDER — CYPROHEPTADINE HYDROCHLORIDE 4 MG/1
4 TABLET ORAL NIGHTLY
Status: DISCONTINUED | OUTPATIENT
Start: 2024-11-16 | End: 2024-11-22 | Stop reason: HOSPADM

## 2024-11-16 RX ORDER — NALOXONE HCL 0.4 MG/ML
0.02 VIAL (ML) INJECTION
Status: DISCONTINUED | OUTPATIENT
Start: 2024-11-16 | End: 2024-11-22 | Stop reason: HOSPADM

## 2024-11-16 RX ORDER — SODIUM,POTASSIUM PHOSPHATES 280-250MG
2 POWDER IN PACKET (EA) ORAL
Status: DISCONTINUED | OUTPATIENT
Start: 2024-11-16 | End: 2024-11-22 | Stop reason: HOSPADM

## 2024-11-16 RX ORDER — ENOXAPARIN SODIUM 100 MG/ML
30 INJECTION SUBCUTANEOUS EVERY 24 HOURS
Status: DISCONTINUED | OUTPATIENT
Start: 2024-11-16 | End: 2024-11-18

## 2024-11-16 RX ORDER — IBUPROFEN 200 MG
16 TABLET ORAL
Status: DISCONTINUED | OUTPATIENT
Start: 2024-11-16 | End: 2024-11-22 | Stop reason: HOSPADM

## 2024-11-16 RX ORDER — ACETAMINOPHEN 325 MG/1
650 TABLET ORAL EVERY 4 HOURS PRN
Status: DISCONTINUED | OUTPATIENT
Start: 2024-11-16 | End: 2024-11-22 | Stop reason: HOSPADM

## 2024-11-16 RX ORDER — ACETAMINOPHEN 325 MG/1
650 TABLET ORAL EVERY 8 HOURS PRN
Status: DISCONTINUED | OUTPATIENT
Start: 2024-11-16 | End: 2024-11-22 | Stop reason: HOSPADM

## 2024-11-16 RX ORDER — LACTULOSE 10 G/15ML
20 SOLUTION ORAL 3 TIMES DAILY
Status: DISCONTINUED | OUTPATIENT
Start: 2024-11-16 | End: 2024-11-22 | Stop reason: HOSPADM

## 2024-11-16 RX ORDER — ALUMINUM HYDROXIDE, MAGNESIUM HYDROXIDE, AND SIMETHICONE 1200; 120; 1200 MG/30ML; MG/30ML; MG/30ML
30 SUSPENSION ORAL 4 TIMES DAILY PRN
Status: DISCONTINUED | OUTPATIENT
Start: 2024-11-16 | End: 2024-11-22 | Stop reason: HOSPADM

## 2024-11-16 RX ORDER — POTASSIUM CHLORIDE 20 MEQ/1
20 TABLET, EXTENDED RELEASE ORAL ONCE
Status: DISCONTINUED | OUTPATIENT
Start: 2024-11-16 | End: 2024-11-16

## 2024-11-16 RX ORDER — LEVOTHYROXINE SODIUM 25 UG/1
75 TABLET ORAL
Status: DISCONTINUED | OUTPATIENT
Start: 2024-11-17 | End: 2024-11-22 | Stop reason: HOSPADM

## 2024-11-16 RX ORDER — DEXTROSE MONOHYDRATE, SODIUM CHLORIDE, AND POTASSIUM CHLORIDE 50; 1.49; 4.5 G/1000ML; G/1000ML; G/1000ML
INJECTION, SOLUTION INTRAVENOUS CONTINUOUS
Status: DISCONTINUED | OUTPATIENT
Start: 2024-11-16 | End: 2024-11-16

## 2024-11-16 RX ORDER — ONDANSETRON HYDROCHLORIDE 2 MG/ML
4 INJECTION, SOLUTION INTRAVENOUS EVERY 6 HOURS PRN
Status: DISCONTINUED | OUTPATIENT
Start: 2024-11-16 | End: 2024-11-22 | Stop reason: HOSPADM

## 2024-11-16 RX ORDER — METOPROLOL SUCCINATE 25 MG/1
25 TABLET, EXTENDED RELEASE ORAL DAILY
Status: DISCONTINUED | OUTPATIENT
Start: 2024-11-17 | End: 2024-11-22 | Stop reason: HOSPADM

## 2024-11-16 RX ORDER — DONEPEZIL HYDROCHLORIDE 5 MG/1
10 TABLET, FILM COATED ORAL DAILY
Status: DISCONTINUED | OUTPATIENT
Start: 2024-11-17 | End: 2024-11-22 | Stop reason: HOSPADM

## 2024-11-16 RX ORDER — HYDROCODONE BITARTRATE AND ACETAMINOPHEN 5; 325 MG/1; MG/1
1 TABLET ORAL EVERY 6 HOURS PRN
Status: DISCONTINUED | OUTPATIENT
Start: 2024-11-16 | End: 2024-11-22 | Stop reason: HOSPADM

## 2024-11-16 RX ORDER — LANOLIN ALCOHOL/MO/W.PET/CERES
800 CREAM (GRAM) TOPICAL
Status: DISCONTINUED | OUTPATIENT
Start: 2024-11-16 | End: 2024-11-22 | Stop reason: HOSPADM

## 2024-11-16 RX ORDER — LEVETIRACETAM 250 MG/1
250 TABLET ORAL 2 TIMES DAILY
Status: DISCONTINUED | OUTPATIENT
Start: 2024-11-16 | End: 2024-11-22 | Stop reason: HOSPADM

## 2024-11-16 RX ORDER — GLUCAGON 1 MG
1 KIT INJECTION
Status: DISCONTINUED | OUTPATIENT
Start: 2024-11-16 | End: 2024-11-22 | Stop reason: HOSPADM

## 2024-11-16 RX ORDER — SODIUM CHLORIDE 0.9 % (FLUSH) 0.9 %
2 SYRINGE (ML) INJECTION
Status: DISCONTINUED | OUTPATIENT
Start: 2024-11-16 | End: 2024-11-22 | Stop reason: HOSPADM

## 2024-11-16 RX ORDER — DEXTROSE MONOHYDRATE, SODIUM CHLORIDE, AND POTASSIUM CHLORIDE 50; 1.49; 4.5 G/1000ML; G/1000ML; G/1000ML
INJECTION, SOLUTION INTRAVENOUS CONTINUOUS
Status: DISCONTINUED | OUTPATIENT
Start: 2024-11-16 | End: 2024-11-17

## 2024-11-16 RX ORDER — HYDRALAZINE HYDROCHLORIDE 20 MG/ML
10 INJECTION INTRAMUSCULAR; INTRAVENOUS EVERY 4 HOURS PRN
Status: DISCONTINUED | OUTPATIENT
Start: 2024-11-16 | End: 2024-11-22 | Stop reason: HOSPADM

## 2024-11-16 RX ORDER — LAMOTRIGINE 100 MG/1
100 TABLET ORAL 2 TIMES DAILY
Status: DISCONTINUED | OUTPATIENT
Start: 2024-11-16 | End: 2024-11-22 | Stop reason: HOSPADM

## 2024-11-16 RX ORDER — IBUPROFEN 200 MG
24 TABLET ORAL
Status: DISCONTINUED | OUTPATIENT
Start: 2024-11-16 | End: 2024-11-22 | Stop reason: HOSPADM

## 2024-11-16 RX ORDER — CLOPIDOGREL BISULFATE 75 MG/1
75 TABLET ORAL DAILY
Status: DISCONTINUED | OUTPATIENT
Start: 2024-11-17 | End: 2024-11-22 | Stop reason: HOSPADM

## 2024-11-16 RX ORDER — TALC
6 POWDER (GRAM) TOPICAL NIGHTLY PRN
Status: DISCONTINUED | OUTPATIENT
Start: 2024-11-16 | End: 2024-11-22 | Stop reason: HOSPADM

## 2024-11-16 RX ORDER — LOSARTAN POTASSIUM 25 MG/1
50 TABLET ORAL DAILY
Status: DISCONTINUED | OUTPATIENT
Start: 2024-11-17 | End: 2024-11-16

## 2024-11-16 RX ORDER — ATORVASTATIN CALCIUM 40 MG/1
80 TABLET, FILM COATED ORAL DAILY
Status: DISCONTINUED | OUTPATIENT
Start: 2024-11-17 | End: 2024-11-22 | Stop reason: HOSPADM

## 2024-11-16 RX ADMIN — DEXTROSE MONOHYDRATE, SODIUM CHLORIDE, AND POTASSIUM CHLORIDE: 50; 1.49; 4.5 INJECTION, SOLUTION INTRAVENOUS at 07:11

## 2024-11-16 RX ADMIN — HYDRALAZINE HYDROCHLORIDE 10 MG: 20 INJECTION, SOLUTION INTRAMUSCULAR; INTRAVENOUS at 07:11

## 2024-11-16 RX ADMIN — ENOXAPARIN SODIUM 30 MG: 30 INJECTION SUBCUTANEOUS at 07:11

## 2024-11-16 RX ADMIN — LACTULOSE 20 G: 20 SOLUTION ORAL at 08:11

## 2024-11-16 RX ADMIN — POTASSIUM BICARBONATE 20 MEQ: 782 TABLET, EFFERVESCENT ORAL at 03:11

## 2024-11-16 RX ADMIN — SODIUM CHLORIDE 1000 ML: 9 INJECTION, SOLUTION INTRAVENOUS at 03:11

## 2024-11-16 RX ADMIN — CYPROHEPTADINE HYDROCHLORIDE 4 MG: 4 TABLET ORAL at 08:11

## 2024-11-16 RX ADMIN — LAMOTRIGINE 100 MG: 100 TABLET ORAL at 08:11

## 2024-11-16 RX ADMIN — LEVETIRACETAM 250 MG: 250 TABLET, FILM COATED ORAL at 08:11

## 2024-11-16 NOTE — H&P
[facility] - Emergency Dept  Hospital Medicine  History & Physical    Patient Name: Alondra Pacheco  MRN: [id number]  Patient Class: IP- Inpatient  Admission Date: 11/16/2024  Attending Physician: Tristen Elliott MD   Primary Care Provider: Jak Park MD         Patient information was obtained from patient and ER records.     Subjective:     Principal Problem:Acute on chronic renal failure    Chief Complaint:   Chief Complaint   Patient presents with    Fatigue     Family tells EMS she has become weaker, not eating, and they are not sure if they can care for her at this point.        HPI: 79-year-old female with pMHx of dementia, HTN, HLD, RA, and AAA who presented to the ED via EMS with family reports of failure to thrive.  According to EMS reports since patient's discharge 10/29/24 she has gotten progressively weaker and has not been eating.  Family also reports that the patient has been out of her medication, Aricept x5 days.  Son reported to EMS that he is unsure what he can care for his mother at this point.  Blood work performed in the ED with a sodium of 147, potassium 3, CO2 33, BUN 33, creatinine 1.5, calcium 12.1, GFR 34.8.  Urine is negative for infection.  Patient will be admitted for further evaluation and treatment.    Past Medical History:   Diagnosis Date    AAA (abdominal aortic aneurysm)     Arthritis     Rheumatoid arthritis(714.0)     Stroke        Past Surgical History:   Procedure Laterality Date    CORONARY ARTERY BYPASS GRAFT      INTRAMEDULLARY RODDING OF TROCHANTER OF FEMUR Right 8/3/2024    Procedure: INSERTION, INTRAMEDULLARY IVONE, FEMUR, TROCHANTER;  Surgeon: Rod England MD;  Location: [facility];  Service: Orthopedics;  Laterality: Right;    TOTAL KNEE ARTHROPLASTY         Review of patient's allergies indicates:   Allergen Reactions    Anesthesia s/i-40 (propofol) [propofol]        No current facility-administered medications on file prior to encounter.      Current Outpatient Medications on File Prior to Encounter   Medication Sig    alendronate (FOSAMAX) 70 MG tablet TAKE 1 TABLET(70 MG) BY MOUTH EVERY 7 DAYS (Patient taking differently: Take 70 mg by mouth every 7 days.)    atorvastatin (LIPITOR) 80 MG tablet TAKE ONE TABLET BY MOUTH EVERY DAY (Patient taking differently: Take 80 mg by mouth once daily.)    calcium carbonate (OS-NEETA) 600 mg calcium (1,500 mg) Tab Take 600 mg by mouth once daily.    clopidogreL (PLAVIX) 75 mg tablet TAKE 1 TABLET BY MOUTH EVERY DAY (Patient taking differently: Take 75 mg by mouth once daily. TAKE 1 TABLET BY MOUTH EVERY DAY)    cyproheptadine (PERIACTIN) 4 mg tablet Take 1 tablet (4 mg total) by mouth every evening.    diclofenac sodium (VOLTAREN) 1 % Gel Apply 2 g topically 4 (four) times daily. (Patient taking differently: Apply 2 g topically 4 (four) times daily. Apply to bilateral knees.)    donepeziL (ARICEPT) 10 MG tablet TAKE ONE TABLET BY MOUTH EVERY DAY (Patient taking differently: Take 10 mg by mouth once daily.)    fenofibrate 160 MG Tab Take 160 mg by mouth once daily.    lamoTRIgine (LAMICTAL) 100 MG tablet Take 100 mg by mouth 2 (two) times daily.     levETIRAcetam (KEPPRA) 250 MG Tab Take 250 mg by mouth 2 (two) times daily.    levothyroxine (SYNTHROID) 75 MCG tablet TAKE ONE TABLET BY MOUTH ONCE DAILY (Patient taking differently: Take 75 mcg by mouth before breakfast.)    losartan (COZAAR) 50 MG tablet TAKE ONE TABLET BY MOUTH EVERY DAY (Patient taking differently: Take 50 mg by mouth once daily.)    metoprolol succinate (TOPROL-XL) 25 MG 24 hr tablet TAKE ONE TABLET BY MOUTH ONCE DAILY (Patient taking differently: Take 25 mg by mouth once daily.)    multivitamin capsule Take 1 capsule by mouth once daily.    pantoprazole (PROTONIX) 40 MG tablet Take 1 tablet (40 mg total) by mouth once daily.    [DISCONTINUED] alendronate (FOSAMAX) 70 MG tablet 1 tablet 30 minutes before the first food, beverage or medicine of the  day with plain water     Family History       Problem Relation (Age of Onset)    Cancer Father    Diabetes Sister    Heart attack Paternal Uncle          Tobacco Use    Smoking status: Former     Current packs/day: 0.00     Average packs/day: 1.5 packs/day for 12.0 years (18.0 ttl pk-yrs)     Types: Cigarettes     Start date: 1963     Quit date: 1975     Years since quittin.8    Smokeless tobacco: Never   Substance and Sexual Activity    Alcohol use: No    Drug use: No    Sexual activity: Not Currently     Review of Systems   Unable to perform ROS: Dementia     Objective:     Vital Signs (Most Recent):  Temp: 98.7 °F (37.1 °C) (24 1237)  Pulse: 82 (24 1237)  Resp: 16 (24 1237)  BP: (!) 151/83 (24 1237)  SpO2: 99 % (24 1237) Vital Signs (24h Range):  Temp:  [98.7 °F (37.1 °C)] 98.7 °F (37.1 °C)  Pulse:  [82] 82  Resp:  [16] 16  SpO2:  [99 %] 99 %  BP: (151)/(83) 151/83     Weight: 63 kg (138 lb 14.2 oz)  Body mass index is 22.42 kg/m².     Physical Exam  Vitals reviewed.   Constitutional:       General: She is awake. She is not in acute distress.     Appearance: Normal appearance. She is not toxic-appearing.   HENT:      Head: Normocephalic and atraumatic.      Mouth/Throat:      Mouth: Mucous membranes are moist.      Pharynx: Oropharynx is clear.   Eyes:      Extraocular Movements: Extraocular movements intact.      Pupils: Pupils are equal, round, and reactive to light.   Cardiovascular:      Rate and Rhythm: Normal rate and regular rhythm.      Pulses: Normal pulses.      Heart sounds: Normal heart sounds.   Pulmonary:      Effort: Pulmonary effort is normal.      Breath sounds: Normal breath sounds.   Abdominal:      General: Bowel sounds are normal. There is no distension.      Palpations: Abdomen is soft.      Tenderness: There is no abdominal tenderness.   Musculoskeletal:         General: No swelling. Normal range of motion.      Cervical back: Normal range of  motion and neck supple.   Skin:     General: Skin is warm and dry.      Capillary Refill: Capillary refill takes less than 2 seconds.   Neurological:      General: No focal deficit present.      Mental Status: She is alert. Mental status is at baseline.   Psychiatric:         Attention and Perception: She is inattentive.         Mood and Affect: Mood normal.         Speech: Speech normal.         Behavior: Behavior is cooperative.         Cognition and Memory: Cognition is impaired. Memory is impaired.              CRANIAL NERVES     CN III, IV, VI   Pupils are equal, round, and reactive to light.       Significant Labs: All pertinent labs within the past 24 hours have been reviewed.  Recent Lab Results         11/16/24  1536   11/16/24  1500   11/16/24  1322        Albumin     3.9       ALP     65       ALT     10       Anion Gap     9       Appearance, UA   Clear         AST     19       Baso #     0.05       Basophil %     0.5       Bilirubin (UA)   Negative         BILIRUBIN TOTAL     0.5  Comment: For infants and newborns, interpretation of results should be based  on gestational age, weight and in agreement with clinical  observations.    Premature Infant recommended reference ranges:  Up to 24 hours.............<8.0 mg/dL  Up to 48 hours............<12.0 mg/dL  3-5 days..................<15.0 mg/dL  6-29 days.................<15.0 mg/dL         BUN     33       Calcium     12.1  Comment: Critical result CA 12.1 mg/dL called to and read back by Yennifer Orozco ED,  RN. at 16-Nov-2024 14:19 [facility].         Chloride     105       CO2     33       Color, UA   Yellow         Creatinine     1.5       Differential Method     Automated       eGFR     34.8       Eos #     0.1       Eos %     1.5       Glucose     99       Glucose, UA   Negative         Gran # (ANC)     6.4       Gran %     68.6       Hematocrit     39.1       Hemoglobin     12.2       Immature Grans (Abs)     0.02  Comment: Mild elevation  in immature granulocytes is non specific and   can be seen in a variety of conditions including stress response,   acute inflammation, trauma and pregnancy. Correlation with other   laboratory and clinical findings is essential.         Immature Granulocytes     0.2       Ketones, UA   Negative         Leukocyte Esterase, UA   Negative         Lymph #     2.0       Lymph %     21.9       Magnesium      2.0       MCH     26.9       MCHC     31.2       MCV     86       Mono #     0.7       Mono %     7.3       MPV     12.2       NITRITE UA   Negative         nRBC     0       Blood, UA   TRACE         pH, UA   7.0         Phosphorus Level     2.8       Platelet Count     227       Potassium     3.0       PROTEIN TOTAL     7.7       Protein, UA   Negative  Comment: Recommend a 24 hour urine protein or a urine   protein/creatinine ratio if globulin induced proteinuria is  clinically suspected.           RBC     4.54       RDW     14.9       SARS-CoV-2 RNA, Amplification, Qual Negative  Comment: This test utilizes isothermal nucleic acid amplification technology   to   detect the SARS-CoV-2 RdRp nucleic acid segment. The analytical   sensitivity   (limit of detection) is 500 copies/swab.     A POSITIVE result is indicative of the presence of SARS-CoV-2 RNA;   clinical   correlation with patient history and other diagnostic information is   necessary to determine patient infection status.    A NEGATIVE result means that SARS-CoV-2 nucleic acids are not present   above   the limit of detection. A NEGATIVE result should be treated as   presumptive.   It does not rule out the possibility of COVID-19 and should not be   the sole   basis for treatment decisions. If COVID-19 is strongly suspected   based on   clinical and exposure history, re-testing using an alternate   molecular assay   should be considered.     This test is FDA approved.Performance characteristics of this test   has been   independently verified by [facility] in conjunction   with   [facility] Department of Pathology and Laboratory   Medicine.             Sodium     147       Spec Grav UA   1.010         Specimen UA   Urine, Catheterized         TSH     1.389       UROBILINOGEN UA   Negative         WBC     9.27               Significant Imaging: I have reviewed all pertinent imaging results/findings within the past 24 hours.      Assessment/Plan:     * Acute on chronic renal failure  LORETTA is likely due to pre-renal azotemia due to intravascular volume depletion. Baseline creatinine is  1.01 . Most recent creatinine and eGFR are listed below.     Most Recent Today 3 mo ago 3 mo ago 3 mo ago 3 mo ago 9 mo ago 1 yr ago   CHEM PROFILE   BUN 33 High  (Today) 33 High  25 High  27 High  23 High  26 High  25 High  24 High    Creatinine 1.5 High  (Today) 1.5 High  1.01 1.24 0.99 1.09 1.2 1.0   eGFR if non  >60.0  (3 yr ago)          eGFR if  >60.0 (3 yr ago)             Plan  - LORETTA is worsening. Will continue current treatment  - Avoid nephrotoxins and renally dose meds for GFR listed above  - Monitor urine output, serial BMP, and adjust therapy as needed  - IVF    Failure to thrive in adult  Consult case management/social work  Consult PT OT  Consult dietary      Hypokalemia  Patient's most recent potassium results are listed below.   Recent Labs     11/16/24  1322   K 3.0*     Plan  - Replete potassium per protocol  - Monitor potassium  BNP 2300  - Patient's hypokalemia is  initial assessment    Hypernatremia  Hypernatremia is likely due to Dehydration. The patient's most recent sodium results are listed below.  Recent Labs     11/16/24  1322   *     Plan  - Aim to correct the sodium by 8-10mEq in 24 hours.   - Plan to correct their hypernatremia with Select IV fluids: D5W/0.45 NaCl with 20 of KCl at a rate of 100 ml/hr.  X1 L  - Will plan to trend the patient's sodium:  Get a BNP of 2300  - The patient's  "hypernatremia is  initial assessment  - consult dietary      Hypercalcemia  Hypercalcemia is likely due to workup started. The patient has the following symptoms due to their hypercalcemia: constipation and weakness. Their most recent calcium and albumin results are listed below.  Recent Labs     11/16/24  1322   CALCIUM 12.1*   ALBUMIN 3.9     Plan  - Obtain the following labs to work up the cause of hypercalcemia: 25-hydroxyvitamin D.   - Treat the hypercalcemia with: IV fluids ordered at a rate of 100 ml/hr.   - The patient's hypercalcemia is  initial assessment .     Seizure disorder  Resume patient seizure medications      Hypertension, essential  Patients blood pressure range in the last 24 hours was: BP  Min: 151/83  Max: 151/83.The patient's inpatient anti-hypertensive regimen is listed below:  Current Antihypertensives  metoprolol succinate (TOPROL-XL) 24 hr tablet 25 mg, Daily, Oral  losartan tablet 50 mg, Daily, Oral    Plan  - BP is controlled, no changes needed to their regimen  - monitor    Hyperlipidemia   Patient is chronically on statin.will continue for now. Monitor clinically. Last LDL was   Lab Results   Component Value Date    LDLCALC 155.0 02/01/2024          Dementia  Resume patient's Aricept.   Patient with dementia with likely etiology of unknown dementia. Dementia is mild. The patient does not have signs of behavioral disturbance. Home dementia medications are Held or Continued: continued.. Continue non-pharmacologic interventions to prevent delirium (No VS between 11PM-5AM, activity during day, opening blinds, providing glasses/hearing aids, and up in chair during daytime). Will avoid narcotics and benzos unless absolutely necessary. PRN anti-psychotics are not prescribed to avoid self harm behaviors.      VTE Risk Mitigation (From admission, onward)           Ordered     enoxaparin injection 30 mg  Daily        Note to Pharmacy: Ht: 5' 6" (1.676 m)  Wt: 63 kg (138 lb 14.2 oz)  Estimated " Creatinine Clearance: 27.5 mL/min (A) (based on SCr of 1.5 mg/dL (H)).   Body mass index is 22.42 kg/m².    11/16/24 1717     IP VTE HIGH RISK PATIENT  Once         11/16/24 1717     Place sequential compression device  Until discontinued         11/16/24 1717                               Pharmacist Renal Dose Adjustment Note    Alondra Pacheco is a 79 y.o. female being treated with the medication Enoxaparin    Patient Data:    Vital Signs (Most Recent):  Temp: 98.7 °F (37.1 °C) (11/16/24 1237)  Pulse: 82 (11/16/24 1237)  Resp: 16 (11/16/24 1237)  BP: (!) 151/83 (11/16/24 1237)  SpO2: 99 % (11/16/24 1237) Vital Signs (72h Range):  Temp:  [98.7 °F (37.1 °C)]   Pulse:  [82]   Resp:  [16]   BP: (151)/(83)   SpO2:  [99 %]      Recent Labs   Lab 11/16/24  1322   CREATININE 1.5*     Serum creatinine: 1.5 mg/dL (H) 11/16/24 1322  Estimated creatinine clearance: 27.5 mL/min (A)    Enoxaparin 40 mg every 24 hr will be changed to Enoxaparin 30 mg every 24 hr    Pharmacist's Name: Saira Genao  Pharmacist's Extension: [phone]      Katelyn Mederos NP  Department of Hospital Medicine  [facility] [place] - Emergency Dept

## 2024-11-16 NOTE — ED PROVIDER NOTES
Encounter Date: 11/16/2024       History     Chief Complaint   Patient presents with    Fatigue     Family tells EMS she has become weaker, not eating, and they are not sure if they can care for her at this point.     HPI history provided via EMS.  Patient is 79-year-old woman who presents from home after her son called because he is having difficulty taking care of her.  He states that she has been having some dark colored urine for several days to weeks as well as recent not eating or drinking and getting weaker.  She has a past medical history of previous stroke, dementia, rheumatoid arthritis, AAA.  Once son arrived, he provided additional history that his mother had ran out of her donepezil for proximally a week and has been back on it for the past 4 or 5 days.  He states approximately a week and a half ago she started having a steady decline where she was becoming weaker and needed more assistance with walking or any ADLs.  That has progressively worsened and now he has great difficulty even getting her out of bed.  She is lethargic and sleeping a lot and not really eating or drinking much.  Review of patient's allergies indicates:   Allergen Reactions    Anesthesia s/i-40 (propofol) [propofol]      Past Medical History:   Diagnosis Date    AAA (abdominal aortic aneurysm)     Arthritis     Rheumatoid arthritis(714.0)     Stroke      Past Surgical History:   Procedure Laterality Date    CORONARY ARTERY BYPASS GRAFT      INTRAMEDULLARY RODDING OF TROCHANTER OF FEMUR Right 8/3/2024    Procedure: INSERTION, INTRAMEDULLARY IVONE, FEMUR, TROCHANTER;  Surgeon: Rod England MD;  Location: [facility];  Service: Orthopedics;  Laterality: Right;    TOTAL KNEE ARTHROPLASTY       Family History   Problem Relation Name Age of Onset    Cancer Father      Diabetes Sister      Heart attack Paternal Uncle       Social History     Tobacco Use    Smoking status: Former     Current packs/day: 0.00     Average packs/day: 1.5  packs/day for 12.0 years (18.0 ttl pk-yrs)     Types: Cigarettes     Start date: 1963     Quit date: 1975     Years since quittin.8    Smokeless tobacco: Never   Substance Use Topics    Alcohol use: No    Drug use: No     Review of Systems   Unable to perform ROS: Dementia       Physical Exam     Initial Vitals [24 1237]   BP Pulse Resp Temp SpO2   (!) 151/83 82 16 98.7 °F (37.1 °C) 99 %      MAP       --         Physical Exam    Nursing note and vitals reviewed.  Constitutional: She appears well-developed and well-nourished. She appears ill (elderly, chronically ill-appearing). No distress.   HENT:   Head: Normocephalic and atraumatic. Mouth/Throat: Mucous membranes are dry.   Eyes: EOM are normal. Pupils are equal, round, and reactive to light.   Neck: Neck supple.   Cardiovascular:  Normal rate and regular rhythm.           Pulmonary/Chest: Breath sounds normal. No respiratory distress. She has no wheezes. She has no rhonchi. She has no rales.   Abdominal: Abdomen is soft. She exhibits no distension. There is no abdominal tenderness.   Musculoskeletal:         General: No edema. Normal range of motion.      Cervical back: Neck supple.     Neurological: She is alert.   Alert to self.  4/5 strength in bilateral upper extremities symmetrically, 3/5 strength in bilateral lower extremities symmetrically.   Skin: Skin is warm and dry. Capillary refill takes less than 2 seconds.         ED Course   Procedures  Labs Reviewed   CBC W/ AUTO DIFFERENTIAL - Abnormal       Result Value    WBC 9.27      RBC 4.54      Hemoglobin 12.2      Hematocrit 39.1      MCV 86      MCH 26.9 (*)     MCHC 31.2 (*)     RDW 14.9 (*)     Platelets 227      MPV 12.2      Immature Granulocytes 0.2      Gran # (ANC) 6.4      Immature Grans (Abs) 0.02      Lymph # 2.0      Mono # 0.7      Eos # 0.1      Baso # 0.05      nRBC 0      Gran % 68.6      Lymph % 21.9      Mono % 7.3      Eosinophil % 1.5      Basophil % 0.5       Differential Method Automated     COMPREHENSIVE METABOLIC PANEL - Abnormal    Sodium 147 (*)     Potassium 3.0 (*)     Chloride 105      CO2 33 (*)     Glucose 99      BUN 33 (*)     Creatinine 1.5 (*)     Calcium 12.1 (*)     Total Protein 7.7      Albumin 3.9      Total Bilirubin 0.5      Alkaline Phosphatase 65      AST 19      ALT 10      eGFR 34.8 (*)     Anion Gap 9     URINALYSIS, REFLEX TO URINE CULTURE    Specimen UA Urine, Catheterized      Color, UA Yellow      Appearance, UA Clear      pH, UA 7.0      Specific Gravity, UA 1.010      Protein, UA Negative      Glucose, UA Negative      Ketones, UA Negative      Bilirubin (UA) Negative      Occult Blood UA TRACE      Nitrite, UA Negative      Urobilinogen, UA Negative      Leukocytes, UA Negative      Narrative:     Specimen Source->Urine   SARS-COV-2 RNA AMPLIFICATION, QUAL    SARS-CoV-2 RNA, Amplification, Qual Negative     TSH    TSH 1.389     MAGNESIUM    Magnesium 2.0     PHOSPHORUS    Phosphorus 2.8            Imaging Results              X-Ray Abdomen Flat And Erect (In process)                      Medications   sodium chloride 0.9% flush 2 mL (has no administration in time range)   melatonin tablet 6 mg (has no administration in time range)   ondansetron injection 4 mg (has no administration in time range)   acetaminophen tablet 650 mg (has no administration in time range)   aluminum-magnesium hydroxide-simethicone 200-200-20 mg/5 mL suspension 30 mL (has no administration in time range)   acetaminophen tablet 650 mg (has no administration in time range)   HYDROcodone-acetaminophen 5-325 mg per tablet 1 tablet (has no administration in time range)   naloxone 0.4 mg/mL injection 0.02 mg (has no administration in time range)   potassium bicarbonate disintegrating tablet 50 mEq (has no administration in time range)   potassium bicarbonate disintegrating tablet 35 mEq (has no administration in time range)   potassium bicarbonate disintegrating tablet  60 mEq (has no administration in time range)   magnesium oxide tablet 800 mg (has no administration in time range)   magnesium oxide tablet 800 mg (has no administration in time range)   potassium, sodium phosphates 280-160-250 mg packet 2 packet (has no administration in time range)   potassium, sodium phosphates 280-160-250 mg packet 2 packet (has no administration in time range)   potassium, sodium phosphates 280-160-250 mg packet 2 packet (has no administration in time range)   glucose chewable tablet 16 g (has no administration in time range)   glucose chewable tablet 24 g (has no administration in time range)   dextrose 50% injection 12.5 g (has no administration in time range)   dextrose 50% injection 25 g (has no administration in time range)   glucagon (human recombinant) injection 1 mg (has no administration in time range)   dextrose 5 % and 0.45 % NaCl with KCl 20 mEq infusion (has no administration in time range)   enoxaparin injection 30 mg (has no administration in time range)   atorvastatin tablet 80 mg (has no administration in time range)   clopidogreL tablet 75 mg (has no administration in time range)   cyproheptadine 4 mg tablet 4 mg (has no administration in time range)   donepeziL tablet 10 mg (has no administration in time range)   levETIRAcetam tablet 250 mg (has no administration in time range)   levothyroxine tablet 75 mcg (has no administration in time range)   metoprolol succinate (TOPROL-XL) 24 hr tablet 25 mg (has no administration in time range)   losartan tablet 50 mg (has no administration in time range)   multivitamin tablet (has no administration in time range)   sodium chloride 0.9% bolus 1,000 mL 1,000 mL (0 mLs Intravenous Stopped 11/16/24 1722)   potassium bicarbonate disintegrating tablet 20 mEq (20 mEq Oral Given 11/16/24 1512)     Medical Decision Making  79-year-old woman with increasing weakness, decreased appetite and and failure to thrive after running out of her dementia  medications and restarting them.  Also having strong odor/discolored urine.  Differential diagnosis includes metabolic encephalopathy from UTI, worsening dementia, dehydration, electrolyte abnormalities.  Urinalysis performed and fails to show evidence of a urinary tract infection.  She does have significant electrolyte abnormalities with potassium of 3.0, critically high calcium of 12.1 with LORETTA creatinine of 1.5 with a BUN of 33 and a GFR of 34.8.  TSH is normal at 1.3, magnesium is 2.0.  Phos is 2.8.  Started on IV fluids in the ED.  COVID-19 testing is negative.  No evidence of UTI on urinalysis.  Discussed with son and Hospital Medicine who agreed to admit the patient for electrolyte replacement, hydration and further care.    Amount and/or Complexity of Data Reviewed  Labs: ordered.    Risk  Prescription drug management.  Decision regarding hospitalization.                                      Clinical Impression:  Final diagnoses:  [E86.0] Dehydration (Primary)  [N17.9] LORETTA (acute kidney injury)  [E83.52] Hypercalcemia  [R62.7] Failure to thrive in adult  [E87.6] Hypokalemia  [N17.9, N18.9] Acute kidney injury superimposed on chronic kidney disease          ED Disposition Condition    Admit Stable                Marv Epps MD  11/16/24 [id number]

## 2024-11-16 NOTE — SUBJECTIVE & OBJECTIVE
Past Medical History:   Diagnosis Date    AAA (abdominal aortic aneurysm)     Arthritis     Rheumatoid arthritis(714.0)     Stroke        Past Surgical History:   Procedure Laterality Date    CORONARY ARTERY BYPASS GRAFT      INTRAMEDULLARY RODDING OF TROCHANTER OF FEMUR Right 8/3/2024    Procedure: INSERTION, INTRAMEDULLARY IVONE, FEMUR, TROCHANTER;  Surgeon: Rod England MD;  Location: [facility];  Service: Orthopedics;  Laterality: Right;    TOTAL KNEE ARTHROPLASTY         Review of patient's allergies indicates:   Allergen Reactions    Anesthesia s/i-40 (propofol) [propofol]        No current facility-administered medications on file prior to encounter.     Current Outpatient Medications on File Prior to Encounter   Medication Sig    alendronate (FOSAMAX) 70 MG tablet TAKE 1 TABLET(70 MG) BY MOUTH EVERY 7 DAYS (Patient taking differently: Take 70 mg by mouth every 7 days.)    atorvastatin (LIPITOR) 80 MG tablet TAKE ONE TABLET BY MOUTH EVERY DAY (Patient taking differently: Take 80 mg by mouth once daily.)    calcium carbonate (OS-NEETA) 600 mg calcium (1,500 mg) Tab Take 600 mg by mouth once daily.    clopidogreL (PLAVIX) 75 mg tablet TAKE 1 TABLET BY MOUTH EVERY DAY (Patient taking differently: Take 75 mg by mouth once daily. TAKE 1 TABLET BY MOUTH EVERY DAY)    cyproheptadine (PERIACTIN) 4 mg tablet Take 1 tablet (4 mg total) by mouth every evening.    diclofenac sodium (VOLTAREN) 1 % Gel Apply 2 g topically 4 (four) times daily. (Patient taking differently: Apply 2 g topically 4 (four) times daily. Apply to bilateral knees.)    donepeziL (ARICEPT) 10 MG tablet TAKE ONE TABLET BY MOUTH EVERY DAY (Patient taking differently: Take 10 mg by mouth once daily.)    fenofibrate 160 MG Tab Take 160 mg by mouth once daily.    lamoTRIgine (LAMICTAL) 100 MG tablet Take 100 mg by mouth 2 (two) times daily.     levETIRAcetam (KEPPRA) 250 MG Tab Take 250 mg by mouth 2 (two) times daily.    levothyroxine (SYNTHROID) 75 MCG  tablet TAKE ONE TABLET BY MOUTH ONCE DAILY (Patient taking differently: Take 75 mcg by mouth before breakfast.)    losartan (COZAAR) 50 MG tablet TAKE ONE TABLET BY MOUTH EVERY DAY (Patient taking differently: Take 50 mg by mouth once daily.)    metoprolol succinate (TOPROL-XL) 25 MG 24 hr tablet TAKE ONE TABLET BY MOUTH ONCE DAILY (Patient taking differently: Take 25 mg by mouth once daily.)    multivitamin capsule Take 1 capsule by mouth once daily.    pantoprazole (PROTONIX) 40 MG tablet Take 1 tablet (40 mg total) by mouth once daily.    [DISCONTINUED] alendronate (FOSAMAX) 70 MG tablet 1 tablet 30 minutes before the first food, beverage or medicine of the day with plain water     Family History       Problem Relation (Age of Onset)    Cancer Father    Diabetes Sister    Heart attack Paternal Uncle          Tobacco Use    Smoking status: Former     Current packs/day: 0.00     Average packs/day: 1.5 packs/day for 12.0 years (18.0 ttl pk-yrs)     Types: Cigarettes     Start date: 1963     Quit date: 1975     Years since quittin.8    Smokeless tobacco: Never   Substance and Sexual Activity    Alcohol use: No    Drug use: No    Sexual activity: Not Currently     Review of Systems   Unable to perform ROS: Dementia     Objective:     Vital Signs (Most Recent):  Temp: 98.7 °F (37.1 °C) (24 1237)  Pulse: 82 (24 1237)  Resp: 16 (24 1237)  BP: (!) 151/83 (24 1237)  SpO2: 99 % (24 1237) Vital Signs (24h Range):  Temp:  [98.7 °F (37.1 °C)] 98.7 °F (37.1 °C)  Pulse:  [82] 82  Resp:  [16] 16  SpO2:  [99 %] 99 %  BP: (151)/(83) 151/83     Weight: 63 kg (138 lb 14.2 oz)  Body mass index is 22.42 kg/m².     Physical Exam  Vitals reviewed.   Constitutional:       General: She is awake. She is not in acute distress.     Appearance: Normal appearance. She is not toxic-appearing.   HENT:      Head: Normocephalic and atraumatic.      Mouth/Throat:      Mouth: Mucous membranes are moist.       Pharynx: Oropharynx is clear.   Eyes:      Extraocular Movements: Extraocular movements intact.      Pupils: Pupils are equal, round, and reactive to light.   Cardiovascular:      Rate and Rhythm: Normal rate and regular rhythm.      Pulses: Normal pulses.      Heart sounds: Normal heart sounds.   Pulmonary:      Effort: Pulmonary effort is normal.      Breath sounds: Normal breath sounds.   Abdominal:      General: Bowel sounds are normal. There is no distension.      Palpations: Abdomen is soft.      Tenderness: There is no abdominal tenderness.   Musculoskeletal:         General: No swelling. Normal range of motion.      Cervical back: Normal range of motion and neck supple.   Skin:     General: Skin is warm and dry.      Capillary Refill: Capillary refill takes less than 2 seconds.   Neurological:      General: No focal deficit present.      Mental Status: She is alert. Mental status is at baseline.   Psychiatric:         Attention and Perception: She is inattentive.         Mood and Affect: Mood normal.         Speech: Speech normal.         Behavior: Behavior is cooperative.         Cognition and Memory: Cognition is impaired. Memory is impaired.              CRANIAL NERVES     CN III, IV, VI   Pupils are equal, round, and reactive to light.       Significant Labs: All pertinent labs within the past 24 hours have been reviewed.  Recent Lab Results         11/16/24  1536   11/16/24  1500   11/16/24  1322        Albumin     3.9       ALP     65       ALT     10       Anion Gap     9       Appearance, UA   Clear         AST     19       Baso #     0.05       Basophil %     0.5       Bilirubin (UA)   Negative         BILIRUBIN TOTAL     0.5  Comment: For infants and newborns, interpretation of results should be based  on gestational age, weight and in agreement with clinical  observations.    Premature Infant recommended reference ranges:  Up to 24 hours.............<8.0 mg/dL  Up to 48 hours............<12.0  mg/dL  3-5 days..................<15.0 mg/dL  6-29 days.................<15.0 mg/dL         BUN     33       Calcium     12.1  Comment: Critical result CA 12.1 mg/dL called to and read back by Yennifer Orozco ED,  RN. at 16-Nov-2024 14:19 by [facility].         Chloride     105       CO2     33       Color, UA   Yellow         Creatinine     1.5       Differential Method     Automated       eGFR     34.8       Eos #     0.1       Eos %     1.5       Glucose     99       Glucose, UA   Negative         Gran # (ANC)     6.4       Gran %     68.6       Hematocrit     39.1       Hemoglobin     12.2       Immature Grans (Abs)     0.02  Comment: Mild elevation in immature granulocytes is non specific and   can be seen in a variety of conditions including stress response,   acute inflammation, trauma and pregnancy. Correlation with other   laboratory and clinical findings is essential.         Immature Granulocytes     0.2       Ketones, UA   Negative         Leukocyte Esterase, UA   Negative         Lymph #     2.0       Lymph %     21.9       Magnesium      2.0       MCH     26.9       MCHC     31.2       MCV     86       Mono #     0.7       Mono %     7.3       MPV     12.2       NITRITE UA   Negative         nRBC     0       Blood, UA   TRACE         pH, UA   7.0         Phosphorus Level     2.8       Platelet Count     227       Potassium     3.0       PROTEIN TOTAL     7.7       Protein, UA   Negative  Comment: Recommend a 24 hour urine protein or a urine   protein/creatinine ratio if globulin induced proteinuria is  clinically suspected.           RBC     4.54       RDW     14.9       SARS-CoV-2 RNA, Amplification, Qual Negative  Comment: This test utilizes isothermal nucleic acid amplification technology   to   detect the SARS-CoV-2 RdRp nucleic acid segment. The analytical   sensitivity   (limit of detection) is 500 copies/swab.     A POSITIVE result is indicative of the presence of SARS-CoV-2 RNA;   clinical    correlation with patient history and other diagnostic information is   necessary to determine patient infection status.    A NEGATIVE result means that SARS-CoV-2 nucleic acids are not present   above   the limit of detection. A NEGATIVE result should be treated as   presumptive.   It does not rule out the possibility of COVID-19 and should not be   the sole   basis for treatment decisions. If COVID-19 is strongly suspected   based on   clinical and exposure history, re-testing using an alternate   molecular assay   should be considered.     This test is FDA approved.Performance characteristics of this test   has been   independently verified by [facility] in conjunction   with   [facility] Department of Pathology and Laboratory   Medicine.             Sodium     147       Spec Grav UA   1.010         Specimen UA   Urine, Catheterized         TSH     1.389       UROBILINOGEN UA   Negative         WBC     9.27               Significant Imaging: I have reviewed all pertinent imaging results/findings within the past 24 hours.

## 2024-11-16 NOTE — ASSESSMENT & PLAN NOTE
Hypercalcemia is likely due to workup started. The patient has the following symptoms due to their hypercalcemia: constipation and weakness. Their most recent calcium and albumin results are listed below.  Recent Labs     11/16/24  1322   CALCIUM 12.1*   ALBUMIN 3.9     Plan  - Obtain the following labs to work up the cause of hypercalcemia: 25-hydroxyvitamin D.   - Treat the hypercalcemia with: IV fluids ordered at a rate of 100 ml/hr.   - The patient's hypercalcemia is  initial assessment .

## 2024-11-16 NOTE — PROGRESS NOTES
Pharmacist Renal Dose Adjustment Note    Alondra Pacheco is a 79 y.o. female being treated with the medication Enoxaparin    Patient Data:    Vital Signs (Most Recent):  Temp: 98.7 °F (37.1 °C) (11/16/24 1237)  Pulse: 82 (11/16/24 1237)  Resp: 16 (11/16/24 1237)  BP: (!) 151/83 (11/16/24 1237)  SpO2: 99 % (11/16/24 1237) Vital Signs (72h Range):  Temp:  [98.7 °F (37.1 °C)]   Pulse:  [82]   Resp:  [16]   BP: (151)/(83)   SpO2:  [99 %]      Recent Labs   Lab 11/16/24  1322   CREATININE 1.5*     Serum creatinine: 1.5 mg/dL (H) 11/16/24 1322  Estimated creatinine clearance: 27.5 mL/min (A)    Enoxaparin 40 mg every 24 hr will be changed to Enoxaparin 30 mg every 24 hr    Pharmacist's Name: Saira Genao  Pharmacist's Extension: [phone]

## 2024-11-16 NOTE — ASSESSMENT & PLAN NOTE
LORETTA is likely due to pre-renal azotemia due to intravascular volume depletion. Baseline creatinine is  1.01 . Most recent creatinine and eGFR are listed below.     Most Recent Today 3 mo ago 3 mo ago 3 mo ago 3 mo ago 9 mo ago 1 yr ago   CHEM PROFILE   BUN 33 High  (Today) 33 High  25 High  27 High  23 High  26 High  25 High  24 High    Creatinine 1.5 High  (Today) 1.5 High  1.01 1.24 0.99 1.09 1.2 1.0   eGFR if non  >60.0  (3 yr ago)          eGFR if  >60.0 (3 yr ago)             Plan  - LORETTA is worsening. Will continue current treatment  - Avoid nephrotoxins and renally dose meds for GFR listed above  - Monitor urine output, serial BMP, and adjust therapy as needed  - IVF

## 2024-11-16 NOTE — ASSESSMENT & PLAN NOTE
Patients blood pressure range in the last 24 hours was: BP  Min: 151/83  Max: 151/83.The patient's inpatient anti-hypertensive regimen is listed below:  Current Antihypertensives  metoprolol succinate (TOPROL-XL) 24 hr tablet 25 mg, Daily, Oral  losartan tablet 50 mg, Daily, Oral    Plan  - BP is controlled, no changes needed to their regimen  - monitor

## 2024-11-16 NOTE — ASSESSMENT & PLAN NOTE
Hypernatremia is likely due to Dehydration. The patient's most recent sodium results are listed below.  Recent Labs     11/16/24  1322   *     Plan  - Aim to correct the sodium by 8-10mEq in 24 hours.   - Plan to correct their hypernatremia with Select IV fluids: D5W/0.45 NaCl with 20 of KCl at a rate of 100 ml/hr.  X1 L  - Will plan to trend the patient's sodium:  Get a BNP of 2300  - The patient's hypernatremia is  initial assessment  - consult dietary

## 2024-11-16 NOTE — HPI
79-year-old female with pMHx of dementia, HTN, HLD, RA, and AAA who presented to the ED via EMS with family reports of failure to thrive.  According to EMS reports since patient's discharge 10/29/24 she has gotten progressively weaker and has not been eating.  Family also reports that the patient has been out of her medication, Aricept x5 days.  Son reported to EMS that he is unsure what he can care for his mother at this point.  Blood work performed in the ED with a sodium of 147, potassium 3, CO2 33, BUN 33, creatinine 1.5, calcium 12.1, GFR 34.8.  Urine is negative for infection.  Patient will be admitted for further evaluation and treatment.

## 2024-11-16 NOTE — ASSESSMENT & PLAN NOTE
Resume patient's Aricept.   Patient with dementia with likely etiology of unknown dementia. Dementia is mild. The patient does not have signs of behavioral disturbance. Home dementia medications are Held or Continued: continued.. Continue non-pharmacologic interventions to prevent delirium (No VS between 11PM-5AM, activity during day, opening blinds, providing glasses/hearing aids, and up in chair during daytime). Will avoid narcotics and benzos unless absolutely necessary. PRN anti-psychotics are not prescribed to avoid self harm behaviors.

## 2024-11-16 NOTE — ASSESSMENT & PLAN NOTE
Patient is chronically on statin.will continue for now. Monitor clinically. Last LDL was   Lab Results   Component Value Date    LDLCALC 155.0 02/01/2024

## 2024-11-16 NOTE — ASSESSMENT & PLAN NOTE
Patient's most recent potassium results are listed below.   Recent Labs     11/16/24  1322   K 3.0*     Plan  - Replete potassium per protocol  - Monitor potassium  BNP 2300  - Patient's hypokalemia is  initial assessment

## 2024-11-17 PROBLEM — Z71.89 ACP (ADVANCE CARE PLANNING): Status: ACTIVE | Noted: 2024-11-17

## 2024-11-17 LAB
25(OH)D3+25(OH)D2 SERPL-MCNC: 58 NG/ML (ref 30–96)
ALBUMIN SERPL BCP-MCNC: 3.6 G/DL (ref 3.5–5.2)
ALP SERPL-CCNC: 59 U/L (ref 55–135)
ALT SERPL W/O P-5'-P-CCNC: 10 U/L (ref 10–44)
ANION GAP SERPL CALC-SCNC: 8 MMOL/L (ref 8–16)
ANISOCYTOSIS BLD QL SMEAR: SLIGHT
AST SERPL-CCNC: 19 U/L (ref 10–40)
BASOPHILS # BLD AUTO: 0.08 K/UL (ref 0–0.2)
BASOPHILS NFR BLD: 1.1 % (ref 0–1.9)
BILIRUB SERPL-MCNC: 0.4 MG/DL (ref 0.1–1)
BUN SERPL-MCNC: 24 MG/DL (ref 8–23)
CALCIUM SERPL-MCNC: 10.9 MG/DL (ref 8.7–10.5)
CHLORIDE SERPL-SCNC: 110 MMOL/L (ref 95–110)
CO2 SERPL-SCNC: 30 MMOL/L (ref 23–29)
CREAT SERPL-MCNC: 1.3 MG/DL (ref 0.5–1.4)
DIFFERENTIAL METHOD BLD: ABNORMAL
EOSINOPHIL # BLD AUTO: 0.2 K/UL (ref 0–0.5)
EOSINOPHIL NFR BLD: 2.2 % (ref 0–8)
ERYTHROCYTE [DISTWIDTH] IN BLOOD BY AUTOMATED COUNT: 15.2 % (ref 11.5–14.5)
EST. GFR  (NO RACE VARIABLE): 41.3 ML/MIN/1.73 M^2
GLUCOSE SERPL-MCNC: 110 MG/DL (ref 70–110)
HCT VFR BLD AUTO: 40.5 % (ref 37–48.5)
HGB BLD-MCNC: 12 G/DL (ref 12–16)
IMM GRANULOCYTES # BLD AUTO: 0.02 K/UL (ref 0–0.04)
IMM GRANULOCYTES NFR BLD AUTO: 0.3 % (ref 0–0.5)
LYMPHOCYTES # BLD AUTO: 2.1 K/UL (ref 1–4.8)
LYMPHOCYTES NFR BLD: 28.9 % (ref 18–48)
MAGNESIUM SERPL-MCNC: 1.8 MG/DL (ref 1.6–2.6)
MCH RBC QN AUTO: 25.9 PG (ref 27–31)
MCHC RBC AUTO-ENTMCNC: 29.6 G/DL (ref 32–36)
MCV RBC AUTO: 87 FL (ref 82–98)
MONOCYTES # BLD AUTO: 0.6 K/UL (ref 0.3–1)
MONOCYTES NFR BLD: 7.6 % (ref 4–15)
NEUTROPHILS # BLD AUTO: 4.3 K/UL (ref 1.8–7.7)
NEUTROPHILS NFR BLD: 59.9 % (ref 38–73)
NRBC BLD-RTO: 0 /100 WBC
PLATELET # BLD AUTO: 165 K/UL (ref 150–450)
PMV BLD AUTO: 13.1 FL (ref 9.2–12.9)
POTASSIUM SERPL-SCNC: 3.1 MMOL/L (ref 3.5–5.1)
PROT SERPL-MCNC: 7.1 G/DL (ref 6–8.4)
PTH-INTACT SERPL-MCNC: 15.1 PG/ML (ref 9–77)
RBC # BLD AUTO: 4.64 M/UL (ref 4–5.4)
SODIUM SERPL-SCNC: 145 MMOL/L (ref 136–145)
SODIUM SERPL-SCNC: 146 MMOL/L (ref 136–145)
SODIUM SERPL-SCNC: 148 MMOL/L (ref 136–145)
WBC # BLD AUTO: 7.23 K/UL (ref 3.9–12.7)

## 2024-11-17 PROCEDURE — 25000003 PHARM REV CODE 250: Performed by: INTERNAL MEDICINE

## 2024-11-17 PROCEDURE — 36415 COLL VENOUS BLD VENIPUNCTURE: CPT | Performed by: NURSE PRACTITIONER

## 2024-11-17 PROCEDURE — 97161 PT EVAL LOW COMPLEX 20 MIN: CPT

## 2024-11-17 PROCEDURE — 84295 ASSAY OF SERUM SODIUM: CPT | Performed by: NURSE PRACTITIONER

## 2024-11-17 PROCEDURE — 25000003 PHARM REV CODE 250: Performed by: NURSE PRACTITIONER

## 2024-11-17 PROCEDURE — 82306 VITAMIN D 25 HYDROXY: CPT | Performed by: NURSE PRACTITIONER

## 2024-11-17 PROCEDURE — 83735 ASSAY OF MAGNESIUM: CPT | Performed by: NURSE PRACTITIONER

## 2024-11-17 PROCEDURE — 63600175 PHARM REV CODE 636 W HCPCS: Performed by: INTERNAL MEDICINE

## 2024-11-17 PROCEDURE — 80053 COMPREHEN METABOLIC PANEL: CPT | Performed by: NURSE PRACTITIONER

## 2024-11-17 PROCEDURE — 97530 THERAPEUTIC ACTIVITIES: CPT

## 2024-11-17 PROCEDURE — 83970 ASSAY OF PARATHORMONE: CPT | Performed by: NURSE PRACTITIONER

## 2024-11-17 PROCEDURE — 12000002 HC ACUTE/MED SURGE SEMI-PRIVATE ROOM

## 2024-11-17 PROCEDURE — 82652 VIT D 1 25-DIHYDROXY: CPT | Performed by: NURSE PRACTITIONER

## 2024-11-17 PROCEDURE — 63600175 PHARM REV CODE 636 W HCPCS: Performed by: NURSE PRACTITIONER

## 2024-11-17 PROCEDURE — 85025 COMPLETE CBC W/AUTO DIFF WBC: CPT | Performed by: NURSE PRACTITIONER

## 2024-11-17 RX ORDER — HALOPERIDOL 5 MG/ML
5 INJECTION INTRAMUSCULAR ONCE
Status: COMPLETED | OUTPATIENT
Start: 2024-11-17 | End: 2024-11-17

## 2024-11-17 RX ORDER — DEXTROSE MONOHYDRATE 50 MG/ML
INJECTION, SOLUTION INTRAVENOUS CONTINUOUS
Status: DISCONTINUED | OUTPATIENT
Start: 2024-11-17 | End: 2024-11-18

## 2024-11-17 RX ORDER — HALOPERIDOL 5 MG/ML
5 INJECTION INTRAMUSCULAR EVERY 6 HOURS PRN
Status: DISCONTINUED | OUTPATIENT
Start: 2024-11-17 | End: 2024-11-17

## 2024-11-17 RX ADMIN — LACTULOSE 20 G: 20 SOLUTION ORAL at 10:11

## 2024-11-17 RX ADMIN — ONDANSETRON 4 MG: 2 INJECTION INTRAMUSCULAR; INTRAVENOUS at 11:11

## 2024-11-17 RX ADMIN — HYDROCODONE BITARTRATE AND ACETAMINOPHEN 1 TABLET: 5; 325 TABLET ORAL at 11:11

## 2024-11-17 RX ADMIN — THERA TABS 1 TABLET: TAB at 10:11

## 2024-11-17 RX ADMIN — CLOPIDOGREL BISULFATE 75 MG: 75 TABLET, FILM COATED ORAL at 10:11

## 2024-11-17 RX ADMIN — HALOPERIDOL LACTATE 5 MG: 5 INJECTION, SOLUTION INTRAMUSCULAR at 06:11

## 2024-11-17 RX ADMIN — METOPROLOL SUCCINATE 25 MG: 25 TABLET, FILM COATED, EXTENDED RELEASE ORAL at 10:11

## 2024-11-17 RX ADMIN — LAMOTRIGINE 100 MG: 100 TABLET ORAL at 09:11

## 2024-11-17 RX ADMIN — LEVETIRACETAM 250 MG: 250 TABLET, FILM COATED ORAL at 09:11

## 2024-11-17 RX ADMIN — LACTULOSE 20 G: 20 SOLUTION ORAL at 03:11

## 2024-11-17 RX ADMIN — LEVETIRACETAM 250 MG: 250 TABLET, FILM COATED ORAL at 10:11

## 2024-11-17 RX ADMIN — ENOXAPARIN SODIUM 30 MG: 30 INJECTION SUBCUTANEOUS at 03:11

## 2024-11-17 RX ADMIN — Medication 800 MG: at 09:11

## 2024-11-17 RX ADMIN — POTASSIUM BICARBONATE 25 MEQ: 978 TABLET, EFFERVESCENT ORAL at 10:11

## 2024-11-17 RX ADMIN — ATORVASTATIN CALCIUM 80 MG: 40 TABLET, FILM COATED ORAL at 10:11

## 2024-11-17 RX ADMIN — LAMOTRIGINE 100 MG: 100 TABLET ORAL at 10:11

## 2024-11-17 RX ADMIN — DEXTROSE MONOHYDRATE: 50 INJECTION, SOLUTION INTRAVENOUS at 11:11

## 2024-11-17 RX ADMIN — DONEPEZIL HYDROCHLORIDE 10 MG: 5 TABLET, FILM COATED ORAL at 10:11

## 2024-11-17 RX ADMIN — CYPROHEPTADINE HYDROCHLORIDE 4 MG: 4 TABLET ORAL at 09:11

## 2024-11-17 RX ADMIN — DEXTROSE MONOHYDRATE, SODIUM CHLORIDE, AND POTASSIUM CHLORIDE: 50; 1.49; 4.5 INJECTION, SOLUTION INTRAVENOUS at 06:11

## 2024-11-17 RX ADMIN — LEVOTHYROXINE SODIUM 75 MCG: 0.03 TABLET ORAL at 05:11

## 2024-11-17 RX ADMIN — LACTULOSE 20 G: 20 SOLUTION ORAL at 09:11

## 2024-11-17 RX ADMIN — POTASSIUM BICARBONATE 25 MEQ: 978 TABLET, EFFERVESCENT ORAL at 03:11

## 2024-11-17 NOTE — ASSESSMENT & PLAN NOTE
Patients blood pressure range in the last 24 hours was: BP  Min: 114/69  Max: 180/87.The patient's inpatient anti-hypertensive regimen is listed below:  Current Antihypertensives  metoprolol succinate (TOPROL-XL) 24 hr tablet 25 mg, Daily, Oral  hydrALAZINE injection 10 mg, Every 4 hours PRN, Intravenous    Plan  - BP is controlled, no changes needed to their regimen  - monitor

## 2024-11-17 NOTE — ASSESSMENT & PLAN NOTE
Patient's most recent potassium results are listed below.   Recent Labs     11/16/24  1322 11/17/24  0547   K 3.0* 3.1*       Plan  - Replete potassium per protocol  - Monitor potassium  BNP 2300  - Patient's hypokalemia is stable - 25 meq potassium bicarb x4 doses ordered

## 2024-11-17 NOTE — SUBJECTIVE & OBJECTIVE
Interval History: Patient was very upset and tearful asking where her son was and asking to go home during morning rounds. She was able to be calmed but not reoriented. During afternoon rounds patient sitting up in bed. Assisted patient to eat a couple of bites of food. Attempted to call patient's son but was unsuccessful.    Review of Systems   Unable to perform ROS: Dementia     Objective:     Vital Signs (Most Recent):  Temp: 98.8 °F (37.1 °C) (11/17/24 1155)  Pulse: 74 (11/17/24 1155)  Resp: 18 (11/17/24 1155)  BP: 119/65 (11/17/24 1155)  SpO2: 98 % (11/17/24 1155) Vital Signs (24h Range):  Temp:  [97.9 °F (36.6 °C)-98.8 °F (37.1 °C)] 98.8 °F (37.1 °C)  Pulse:  [] 74  Resp:  [18-21] 18  SpO2:  [86 %-99 %] 98 %  BP: (114-180)/() 119/65     Weight: 56 kg (123 lb 7.3 oz)  Body mass index is 19.93 kg/m².    Intake/Output Summary (Last 24 hours) at 11/17/2024 1457  Last data filed at 11/17/2024 1106  Gross per 24 hour   Intake 1120 ml   Output 400 ml   Net 720 ml         Physical Exam  HENT:      Head: Normocephalic and atraumatic.      Mouth/Throat:      Mouth: Mucous membranes are dry.   Eyes:      Pupils: Pupils are equal, round, and reactive to light.   Cardiovascular:      Rate and Rhythm: Normal rate and regular rhythm.      Pulses: Normal pulses.      Heart sounds: Normal heart sounds.   Pulmonary:      Effort: Pulmonary effort is normal.      Breath sounds: Normal breath sounds.   Abdominal:      General: Bowel sounds are normal. There is no distension.      Palpations: Abdomen is soft.      Tenderness: There is no abdominal tenderness. There is no guarding or rebound.   Musculoskeletal:      Cervical back: No rigidity.      Right lower leg: No edema.      Left lower leg: No edema.   Skin:     General: Skin is warm.      Coloration: Skin is pale.   Neurological:      Mental Status: She is alert. She is disoriented.   Psychiatric:         Cognition and Memory: Cognition is impaired. Memory is  impaired.             Significant Labs: All pertinent labs within the past 24 hours have been reviewed.    Significant Imaging: I have reviewed all pertinent imaging results/findings within the past 24 hours.

## 2024-11-17 NOTE — PLAN OF CARE
[facility]  Initial Discharge Assessment       Primary Care Provider: Jak Park MD    Admission Diagnosis: Acute kidney injury superimposed on chronic kidney disease [N17.9, N18.9]    Admission Date: 11/16/2024  Expected Discharge Date:      completed discharge assessment via phone with Pt son Gigi [phone]. Unable to assess Pt AAO Pt would not answer questions. Demographics, PCP, and insurance verified. No home health. No dialysis. Pt  son reports that Pt is completely  dependent with ADLs and he helps her do everything . Pt son  verbalized plan to discharge to a nursing facility and [facility] being first choice  Pt has no other needs to be addressed at this time.    Transition of Care Barriers: None    Payor: [employer] MEDICARE / Plan: [employer] HMO PPO SPECIAL NEEDS / Product Type: Medicare Advantage /     Extended Emergency Contact Information  Primary Emergency Contact: Gigi Pacheco  Mobile Phone: [phone]  Relation: Son   needed? No    Discharge Plan A: Skilled Nursing Facility  Discharge Plan B: New Nursing Home placement - [facility] care facility      [id number] [place] - [address]  [address]  [place] [address]  Phone: [phone] Fax: [phone]    [facility] - [place] - [address]  [address]  [place] [address]  Phone: [phone] Fax: [phone]      Initial Assessment (most recent)       Adult Discharge Assessment - 11/17/24 1023          Discharge Assessment    Assessment Type Discharge Planning Assessment     Confirmed/corrected address, phone number and insurance Yes     Confirmed Demographics Correct on Facesheet     Source of Information family     Communicated BARRETT with patient/caregiver Date not available/Unable to determine     Reason For Admission Acute on chronic renal failure     People in Home child(lucas), adult     Do you expect to return to your current living situation? Yes      Do you have help at home or someone to help you manage your care at home? Yes     Who are your caregiver(s) and their phone number(s)? Gigi Pacheco (Son)  [phone]     Prior to hospitilization cognitive status: Unable to Assess     Current cognitive status: Unable to Assess     Walking or Climbing Stairs Difficulty yes     Walking or Climbing Stairs ambulation difficulty, dependent;stair climbing difficulty, dependent;transferring difficulty, dependent     Dressing/Bathing Difficulty yes     Dressing/Bathing bathing difficulty, dependent;dressing difficulty, dependent     Home Layout Able to live on 1st floor     Readmission within 30 days? No     Patient currently being followed by outpatient case management? No     Do you currently have service(s) that help you manage your care at home? No     Do you take prescription medications? Yes     Do you have prescription coverage? Yes     Coverage Payor: [employer] MANAGED MEDICARE - [facility] SPECIAL NEEDS - CAPITATED     Do you have any problems affording any of your prescribed medications? No     Is the patient taking medications as prescribed? yes     Who is going to help you get home at discharge? Gigi Pacheco (Son)  [phone]     Are you on dialysis? No     Do you take coumadin? No     Discharge Plan A Skilled Nursing Facility     Discharge Plan B New Nursing Home placement - long-term care facility     DME Needed Upon Discharge  none     Discharge Plan discussed with: Adult children     Transition of Care Barriers None

## 2024-11-17 NOTE — PROGRESS NOTES
[facility] Medicine  Progress Note    Patient Name: Alondra Pacheco  MRN: [id number]  Patient Class: IP- Inpatient   Admission Date: 11/16/2024  Length of Stay: 1 days  Attending Physician: Ani Calabrese DO  Primary Care Provider: Jak Park MD        Subjective:     Principal Problem:Acute on chronic renal failure        HPI:  79-year-old female with pMHx of dementia, HTN, HLD, RA, and AAA who presented to the ED via EMS with family reports of failure to thrive.  According to EMS reports since patient's discharge 10/29/24 she has gotten progressively weaker and has not been eating.  Family also reports that the patient has been out of her medication, Aricept x5 days.  Son reported to EMS that he is unsure what he can care for his mother at this point.  Blood work performed in the ED with a sodium of 147, potassium 3, CO2 33, BUN 33, creatinine 1.5, calcium 12.1, GFR 34.8.  Urine is negative for infection.  Patient will be admitted for further evaluation and treatment.    Overview/Hospital Course:  Alondra Pacheco is an 79 year old female with PMH dementia, HLD, CKD stage 3, HTN, seizure disorder, RA, AAA, and stroke. She was admitted for acute on chronic renal failure and FTT. In the ED patient had labs remarkable for Na 147, K 3, CO2, 33, BUN 33, Creat 1.5, Ca 12.1, and GFR 34.8. Patient started on IVF for hypernatremia and dehydration. PT/OT ordered. Registered dietician consulted.     Interval History: Patient was very upset and tearful asking where her son was and asking to go home during morning rounds. She was able to be calmed but not reoriented. During afternoon rounds patient sitting up in bed. Assisted patient to eat a couple of bites of food. Patient's son updated on plan of care, all questions answered.    Review of Systems   Unable to perform ROS: Dementia     Objective:     Vital Signs (Most Recent):  Temp: 98.8 °F (37.1 °C) (11/17/24 1155)  Pulse: 74 (11/17/24  1155)  Resp: 18 (11/17/24 1155)  BP: 119/65 (11/17/24 1155)  SpO2: 98 % (11/17/24 1155) Vital Signs (24h Range):  Temp:  [97.9 °F (36.6 °C)-98.8 °F (37.1 °C)] 98.8 °F (37.1 °C)  Pulse:  [] 74  Resp:  [18-21] 18  SpO2:  [86 %-99 %] 98 %  BP: (114-180)/() 119/65     Weight: 56 kg (123 lb 7.3 oz)  Body mass index is 19.93 kg/m².    Intake/Output Summary (Last 24 hours) at 11/17/2024 1457  Last data filed at 11/17/2024 1106  Gross per 24 hour   Intake 1120 ml   Output 400 ml   Net 720 ml         Physical Exam  HENT:      Head: Normocephalic and atraumatic.      Mouth/Throat:      Mouth: Mucous membranes are dry.   Eyes:      Pupils: Pupils are equal, round, and reactive to light.   Cardiovascular:      Rate and Rhythm: Normal rate and regular rhythm.      Pulses: Normal pulses.      Heart sounds: Normal heart sounds.   Pulmonary:      Effort: Pulmonary effort is normal.      Breath sounds: Normal breath sounds.   Abdominal:      General: Bowel sounds are normal. There is no distension.      Palpations: Abdomen is soft.      Tenderness: There is no abdominal tenderness. There is no guarding or rebound.   Musculoskeletal:      Cervical back: No rigidity.      Right lower leg: No edema.      Left lower leg: No edema.   Skin:     General: Skin is warm.      Coloration: Skin is pale.   Neurological:      Mental Status: She is alert. She is disoriented.   Psychiatric:         Cognition and Memory: Cognition is impaired. Memory is impaired.             Significant Labs: All pertinent labs within the past 24 hours have been reviewed.    Significant Imaging: I have reviewed all pertinent imaging results/findings within the past 24 hours.    Assessment/Plan:      * Acute on chronic renal failure  LORETTA is likely due to pre-renal azotemia due to intravascular volume depletion. Baseline creatinine is  1.01 . Most recent creatinine and eGFR are listed below.     Most Recent Today 3 mo ago 3 mo ago 3 mo ago 3 mo ago 9 mo  ago 1 yr ago   CHEM PROFILE   BUN 33 High  (Today) 33 High  25 High  27 High  23 High  26 High  25 High  24 High    Creatinine 1.5 High  (Today) 1.5 High  1.01 1.24 0.99 1.09 1.2 1.0   eGFR if non  >60.0  (3 yr ago)          eGFR if  >60.0 (3 yr ago)             Plan  - LORETTA is worsening. Will continue current treatment  - Avoid nephrotoxins and renally dose meds for GFR listed above  - Monitor urine output, serial BMP, and adjust therapy as needed  - IVF      ACP (advance care planning)  In light of the patients advanced and life limiting illness, I engaged the family in a conversation about the patient's preferences for care  at the very end of life. Patient's son expresses that patient is to have all resuscitation efforts performed in the event that her heart stops or she stops breathing.     However, if patient's health continues to decline and she develops organ failure and is unlikely to make any meaningful recovery, he would like to revisit code status.     I informed him that patient would be made a full code.  I spent a total of 10 minutes engaging the patient's son in this advance care planning discussion.     Failure to thrive in adult  Consult case management/social work  Consult PT OT  Consult dietary  Encourage PO intake      Hypokalemia  Patient's most recent potassium results are listed below.   Recent Labs     11/16/24  1322 11/17/24  0547   K 3.0* 3.1*       Plan  - Replete potassium per protocol  - Monitor potassium  BNP 2300  - Patient's hypokalemia is stable - 25 meq potassium bicarb x4 doses ordered    Hypernatremia  Hypernatremia is likely due to Dehydration. The patient's most recent sodium results are listed below.  Recent Labs     11/16/24  1322 11/17/24  0547 11/17/24  1212   * 148* 146*       Plan  - Aim to correct the sodium by 8-10mEq in 24 hours.   - Plan to correct their hypernatremia with Select IV fluids: D5W  at a rate of 100 ml/hr  - Will plan  to trend the patient's sodium: Every 6 hours  - The patient's hypernatremia is worsening on D5W/0.45% NS - gtt changed to D5W with some improvement  - consult dietary      Hypercalcemia  Hypercalcemia is likely due to workup started. The patient has the following symptoms due to their hypercalcemia: constipation and weakness. Their most recent calcium and albumin results are listed below.  Recent Labs     11/16/24  1322 11/17/24  0547   CALCIUM 12.1* 10.9*   ALBUMIN 3.9 3.6       Plan  - Obtain the following labs to work up the cause of hypercalcemia: 25-hydroxyvitamin D.   - Treat the hypercalcemia with: IV fluids ordered at a rate of 100 ml/hr.   - The patient's hypercalcemia is  initial assessment .     Seizure disorder  Continue patient seizure medications      Hypertension, essential  Patients blood pressure range in the last 24 hours was: BP  Min: 114/69  Max: 180/87.The patient's inpatient anti-hypertensive regimen is listed below:  Current Antihypertensives  metoprolol succinate (TOPROL-XL) 24 hr tablet 25 mg, Daily, Oral  hydrALAZINE injection 10 mg, Every 4 hours PRN, Intravenous    Plan  - BP is controlled, no changes needed to their regimen  - monitor    Hyperlipidemia   Patient is chronically on statin.will continue for now. Monitor clinically. Last LDL was   Lab Results   Component Value Date    LDLCALC 155.0 02/01/2024          Dementia  Resume patient's Aricept.   Patient with dementia with likely etiology of unknown dementia. Dementia is mild. The patient does not have signs of behavioral disturbance. Home dementia medications are Held or Continued: continued.. Continue non-pharmacologic interventions to prevent delirium (No VS between 11PM-5AM, activity during day, opening blinds, providing glasses/hearing aids, and up in chair during daytime). Will avoid narcotics and benzos unless absolutely necessary. PRN anti-psychotics are not prescribed to avoid self harm behaviors.      VTE Risk Mitigation  "(From admission, onward)           Ordered     enoxaparin injection 30 mg  Daily        Note to Pharmacy: Ht: 5' 6" (1.676 m)  Wt: 63 kg (138 lb 14.2 oz)  Estimated Creatinine Clearance: 27.5 mL/min (A) (based on SCr of 1.5 mg/dL (H)).   Body mass index is 22.42 kg/m².    11/16/24 1717     IP VTE HIGH RISK PATIENT  Once         11/16/24 1717     Place sequential compression device  Until discontinued         11/16/24 1717                    Discharge Planning   BARRETT:      Code Status: Full Code   Is the patient medically ready for discharge?:     Reason for patient still in hospital (select all that apply): Patient trending condition, Treatment, PT / OT recommendations, and Pending disposition  Discharge Plan A: Skilled Nursing Facility                  Charu Oro NP  Department of Hospital Medicine   [facility]    "

## 2024-11-17 NOTE — ASSESSMENT & PLAN NOTE
In light of the patients advanced and life limiting illness, I engaged the family in a conversation about the patient's preferences for care  at the very end of life. Patient's son expresses that patient is to have all resuscitation efforts performed in the event that her heart stops or she stops breathing.    However, if patient's health continues to decline and she develops organ failure and is unlikely to make any meaningful recovery, he would like to revisit code status.    I informed him that patient would be made a full code.  I spent a total of 10 minutes engaging the patient's son in this advance care planning discussion.     (Above from 11/17- Charu Oro NP)

## 2024-11-17 NOTE — PT/OT/SLP EVAL
Physical Therapy Evaluation    Patient Name:  Alondra Pacheco   MRN:  [id number]    Recommendations:     Discharge Recommendations: Moderate Intensity Therapy   Discharge Equipment Recommendations: to be determined by next level of care   Barriers to discharge: Decreased caregiver support and RLE pain limiting tolerance for functional mobility     Assessment:     Alondra Pacheco is a 79 y.o. female admitted with a medical diagnosis of Acute on chronic renal failure.  She presents with the following impairments/functional limitations: weakness, impaired endurance, impaired self care skills, impaired functional mobility, impaired balance, impaired cognition, decreased upper extremity function, decreased lower extremity function, decreased safety awareness, pain, decreased ROM, impaired cardiopulmonary response to activity, orthopedic precautions. Patient oriented to her first name only with chart indicating a history of dementia. She is unable to answer questions regarding PLOF. Chart review indicates that she lives with her son and had R IM soco of femur on 8/3/24 at [facility] and is RLE WBAT. She is unable to tolerate PT performing ROM on RLE. She requires MaxA for long sitting. PCT present to assist with scooting patient up in bed requiring MaxA x2. Patient soiled with BM requiring MaxA for rolling and TotalA for hygiene. Bed alarm on and all needs met. Patient will be seen for a trial of PT to determine appropriateness and agreeableness for mobility. Hopefully her son will be able to clarify if she has been mobilizing at home since her surgery in August. She may benefit from SNF to shelter if son is no longer able to provide the care she needs.     Rehab Prognosis: Fair; patient would benefit from acute skilled PT services to address these deficits and reach maximum level of function.    Recent Surgery: * No surgery found *      Plan:     During this hospitalization, patient to be seen 3 x/week to address  the identified rehab impairments via gait training, therapeutic activities, therapeutic exercises and progress toward the following goals:    Plan of Care Expires:  12/17/24    Subjective     Chief Complaint: RLE pain   Patient/Family Comments/goals: stop moving   Pain/Comfort:  Pain Rating 1:  (unable to rate, but complain of RLE pain with all movement)    Patients cultural, spiritual, Zoroastrianism conflicts given the current situation:      Living Environment:  Chart review indicates she lives with son   Prior to admission, patients level of function was unknown.  Equipment used at home:  (unknown).  DME owned (not currently used):  unknown .  Upon discharge, patient will have assistance from facility.    Objective:     Communicated with DENY Griffin prior to session.  Patient found HOB elevated with bed alarm, PureWick, peripheral IV, telemetry  upon PT entry to room.    General Precautions: Standard, fall (dementia)  Orthopedic Precautions:RLE weight bearing as tolerated (R IM soco of femur 8/3/24 at [facility] per chart review)   Braces: N/A  Respiratory Status: Room air    Exams:  RLE Strength: 1/5 with patient unable to tolerate movement due to pain   LLE Strength: 2/5    Functional Mobility:  Bed Mobility:     Rolling Left:  maximal assistance  Rolling Right: maximal assistance  Scooting: maximal assistance and of 2 persons  Supine to Long sitting: maximal assistance with R HHA      AM-PAC 6 CLICK MOBILITY  Total Score:8       Treatment & Education:  Patient was educated on the importance of OOB activity and functional mobility to negate negative effects of prolonged bed rest during hospitalization, safe transfers, and D/C planning     Patient left HOB elevated with all lines intact, call button in reach, bed alarm on, and RN notified.    GOALS:   Multidisciplinary Problems       Physical Therapy Goals          Problem: Physical Therapy    Goal Priority Disciplines Outcome Interventions   Physical Therapy Goal      PT, PT/OT     Description: Goals to be met by: 24     Patient will increase functional independence with mobility by performin. Supine to sit with Moderate Assistance  2. Sit to stand transfer with Moderate Assistance  3. Bed to chair transfer with Moderate Assistance using Rolling Walker  4. Gait  x 5 feet with Moderate Assistance using Rolling Walker.                          History:     Past Medical History:   Diagnosis Date    AAA (abdominal aortic aneurysm)     Arthritis     Rheumatoid arthritis(714.0)     Stroke        Past Surgical History:   Procedure Laterality Date    CORONARY ARTERY BYPASS GRAFT      INTRAMEDULLARY RODDING OF TROCHANTER OF FEMUR Right 8/3/2024    Procedure: INSERTION, INTRAMEDULLARY IVONE, FEMUR, TROCHANTER;  Surgeon: Rod England MD;  Location: [facility];  Service: Orthopedics;  Laterality: Right;    TOTAL KNEE ARTHROPLASTY         Time Tracking:     PT Received On: 24  PT Start Time: 1037     PT Stop Time: 1103  PT Total Time (min): 26 min     Billable Minutes: Evaluation 10 and Therapeutic Activity 16      2024

## 2024-11-17 NOTE — ASSESSMENT & PLAN NOTE
Hypernatremia is likely due to Dehydration. The patient's most recent sodium results are listed below.  Recent Labs     11/16/24  1322 11/17/24  0547 11/17/24  1212   * 148* 146*       Plan  - Aim to correct the sodium by 8-10mEq in 24 hours.   - Plan to correct their hypernatremia with Select IV fluids: D5W  at a rate of 100 ml/hr  - Will plan to trend the patient's sodium: Every 6 hours  - The patient's hypernatremia is worsening on D5W/0.45% NS - gtt changed to D5W with some improvement  - consult dietary

## 2024-11-17 NOTE — ASSESSMENT & PLAN NOTE
Hypercalcemia is likely due to workup started. The patient has the following symptoms due to their hypercalcemia: constipation and weakness. Their most recent calcium and albumin results are listed below.  Recent Labs     11/16/24  1322 11/17/24  0547   CALCIUM 12.1* 10.9*   ALBUMIN 3.9 3.6       Plan  - Obtain the following labs to work up the cause of hypercalcemia: 25-hydroxyvitamin D.   - Treat the hypercalcemia with: IV fluids ordered at a rate of 100 ml/hr.   - The patient's hypercalcemia is  initial assessment .

## 2024-11-17 NOTE — PT/OT/SLP PROGRESS
Occupational Therapy   Treatment    Name: Alondra Pacheco  MRN: [id number]  Admitting Diagnosis:  Acute on chronic renal failure       Patient unable to be seen due to fatigue, pain, and disorientation. Unsafe for any movements, and unable to answer any questions or follow any commands.

## 2024-11-17 NOTE — HOSPITAL COURSE
Alondra Pacheco is an 79 year old female with PMHx of dementia, HLD, CKD stage 3, HTN, seizure disorder, RA, AAA, and stroke.  She was admitted for acute on chronic renal failure and FTT.  She was treated with IVFs and restarted on Aricept, which she had been off of for 5 days prior to hospitalization.  She had improvement in kidney function and overall mental status.  Today, patient was sitting up in bed, eating breakfast.  Registered dietician was consulted.  PT/OT were ordered, recommended moderate intensity therapy.   and  worked on placement.  SNF was denied.  Discussed with son (Gigi), ultimate plan is for care home placement which he has already started and he will continue to work on that at home.  Home health was ordered.  Plan of care was discussed with patient's son and he verbalized understanding.  Patient was seen and examined on the day of discharge.

## 2024-11-18 PROBLEM — E87.6 HYPOKALEMIA: Status: RESOLVED | Noted: 2024-11-16 | Resolved: 2024-11-18

## 2024-11-18 PROBLEM — E83.52 HYPERCALCEMIA: Status: RESOLVED | Noted: 2024-11-16 | Resolved: 2024-11-18

## 2024-11-18 PROBLEM — E44.0 MODERATE MALNUTRITION: Status: ACTIVE | Noted: 2024-11-18

## 2024-11-18 LAB
ALBUMIN SERPL BCP-MCNC: 3.4 G/DL (ref 3.5–5.2)
ALP SERPL-CCNC: 63 U/L (ref 55–135)
ALT SERPL W/O P-5'-P-CCNC: 12 U/L (ref 10–44)
ANION GAP SERPL CALC-SCNC: 7 MMOL/L (ref 8–16)
AST SERPL-CCNC: 19 U/L (ref 10–40)
BASOPHILS # BLD AUTO: 0.05 K/UL (ref 0–0.2)
BASOPHILS NFR BLD: 0.8 % (ref 0–1.9)
BILIRUB SERPL-MCNC: 0.7 MG/DL (ref 0.1–1)
BUN SERPL-MCNC: 14 MG/DL (ref 8–23)
CALCIUM SERPL-MCNC: 10.3 MG/DL (ref 8.7–10.5)
CHLORIDE SERPL-SCNC: 104 MMOL/L (ref 95–110)
CO2 SERPL-SCNC: 29 MMOL/L (ref 23–29)
CREAT SERPL-MCNC: 1.2 MG/DL (ref 0.5–1.4)
DIFFERENTIAL METHOD BLD: ABNORMAL
EOSINOPHIL # BLD AUTO: 0.3 K/UL (ref 0–0.5)
EOSINOPHIL NFR BLD: 4.2 % (ref 0–8)
ERYTHROCYTE [DISTWIDTH] IN BLOOD BY AUTOMATED COUNT: 15.2 % (ref 11.5–14.5)
EST. GFR  (NO RACE VARIABLE): 45.5 ML/MIN/1.73 M^2
GLUCOSE SERPL-MCNC: 95 MG/DL (ref 70–110)
HCT VFR BLD AUTO: 37.8 % (ref 37–48.5)
HGB BLD-MCNC: 11.7 G/DL (ref 12–16)
IMM GRANULOCYTES # BLD AUTO: 0.01 K/UL (ref 0–0.04)
IMM GRANULOCYTES NFR BLD AUTO: 0.2 % (ref 0–0.5)
LYMPHOCYTES # BLD AUTO: 1.5 K/UL (ref 1–4.8)
LYMPHOCYTES NFR BLD: 24.3 % (ref 18–48)
MAGNESIUM SERPL-MCNC: 1.8 MG/DL (ref 1.6–2.6)
MCH RBC QN AUTO: 26.7 PG (ref 27–31)
MCHC RBC AUTO-ENTMCNC: 31 G/DL (ref 32–36)
MCV RBC AUTO: 86 FL (ref 82–98)
MONOCYTES # BLD AUTO: 0.5 K/UL (ref 0.3–1)
MONOCYTES NFR BLD: 7.2 % (ref 4–15)
NEUTROPHILS # BLD AUTO: 4 K/UL (ref 1.8–7.7)
NEUTROPHILS NFR BLD: 63.3 % (ref 38–73)
NRBC BLD-RTO: 0 /100 WBC
PLATELET # BLD AUTO: 176 K/UL (ref 150–450)
PMV BLD AUTO: 11.9 FL (ref 9.2–12.9)
POTASSIUM SERPL-SCNC: 4.1 MMOL/L (ref 3.5–5.1)
PROT SERPL-MCNC: 6.8 G/DL (ref 6–8.4)
RBC # BLD AUTO: 4.39 M/UL (ref 4–5.4)
SODIUM SERPL-SCNC: 140 MMOL/L (ref 136–145)
SODIUM SERPL-SCNC: 141 MMOL/L (ref 136–145)
SODIUM SERPL-SCNC: 144 MMOL/L (ref 136–145)
WBC # BLD AUTO: 6.26 K/UL (ref 3.9–12.7)

## 2024-11-18 PROCEDURE — 84295 ASSAY OF SERUM SODIUM: CPT | Mod: 91 | Performed by: NURSE PRACTITIONER

## 2024-11-18 PROCEDURE — 80053 COMPREHEN METABOLIC PANEL: CPT | Performed by: NURSE PRACTITIONER

## 2024-11-18 PROCEDURE — 99221 1ST HOSP IP/OBS SF/LOW 40: CPT | Mod: ,,, | Performed by: FAMILY MEDICINE

## 2024-11-18 PROCEDURE — 25000003 PHARM REV CODE 250: Performed by: NURSE PRACTITIONER

## 2024-11-18 PROCEDURE — 63600175 PHARM REV CODE 636 W HCPCS: Performed by: FAMILY MEDICINE

## 2024-11-18 PROCEDURE — 36415 COLL VENOUS BLD VENIPUNCTURE: CPT | Performed by: NURSE PRACTITIONER

## 2024-11-18 PROCEDURE — 97530 THERAPEUTIC ACTIVITIES: CPT

## 2024-11-18 PROCEDURE — 97166 OT EVAL MOD COMPLEX 45 MIN: CPT

## 2024-11-18 PROCEDURE — 12000002 HC ACUTE/MED SURGE SEMI-PRIVATE ROOM

## 2024-11-18 PROCEDURE — 85025 COMPLETE CBC W/AUTO DIFF WBC: CPT | Performed by: NURSE PRACTITIONER

## 2024-11-18 PROCEDURE — 25000003 PHARM REV CODE 250: Performed by: INTERNAL MEDICINE

## 2024-11-18 PROCEDURE — 83735 ASSAY OF MAGNESIUM: CPT | Performed by: NURSE PRACTITIONER

## 2024-11-18 PROCEDURE — 84295 ASSAY OF SERUM SODIUM: CPT | Performed by: NURSE PRACTITIONER

## 2024-11-18 RX ORDER — ENOXAPARIN SODIUM 100 MG/ML
40 INJECTION SUBCUTANEOUS EVERY 24 HOURS
Status: DISCONTINUED | OUTPATIENT
Start: 2024-11-18 | End: 2024-11-22 | Stop reason: HOSPADM

## 2024-11-18 RX ADMIN — LEVETIRACETAM 250 MG: 250 TABLET, FILM COATED ORAL at 10:11

## 2024-11-18 RX ADMIN — LACTULOSE 20 G: 20 SOLUTION ORAL at 08:11

## 2024-11-18 RX ADMIN — ENOXAPARIN SODIUM 40 MG: 40 INJECTION SUBCUTANEOUS at 05:11

## 2024-11-18 RX ADMIN — CLOPIDOGREL BISULFATE 75 MG: 75 TABLET, FILM COATED ORAL at 10:11

## 2024-11-18 RX ADMIN — LAMOTRIGINE 100 MG: 100 TABLET ORAL at 08:11

## 2024-11-18 RX ADMIN — THERA TABS 1 TABLET: TAB at 10:11

## 2024-11-18 RX ADMIN — LACTULOSE 20 G: 20 SOLUTION ORAL at 02:11

## 2024-11-18 RX ADMIN — ATORVASTATIN CALCIUM 80 MG: 40 TABLET, FILM COATED ORAL at 10:11

## 2024-11-18 RX ADMIN — LEVOTHYROXINE SODIUM 75 MCG: 0.03 TABLET ORAL at 06:11

## 2024-11-18 RX ADMIN — HYDROCODONE BITARTRATE AND ACETAMINOPHEN 1 TABLET: 5; 325 TABLET ORAL at 05:11

## 2024-11-18 RX ADMIN — METOPROLOL SUCCINATE 25 MG: 25 TABLET, FILM COATED, EXTENDED RELEASE ORAL at 10:11

## 2024-11-18 RX ADMIN — HYDROCODONE BITARTRATE AND ACETAMINOPHEN 1 TABLET: 5; 325 TABLET ORAL at 10:11

## 2024-11-18 RX ADMIN — CYPROHEPTADINE HYDROCHLORIDE 4 MG: 4 TABLET ORAL at 08:11

## 2024-11-18 RX ADMIN — LACTULOSE 20 G: 20 SOLUTION ORAL at 10:11

## 2024-11-18 RX ADMIN — LAMOTRIGINE 100 MG: 100 TABLET ORAL at 10:11

## 2024-11-18 RX ADMIN — LEVETIRACETAM 250 MG: 250 TABLET, FILM COATED ORAL at 08:11

## 2024-11-18 RX ADMIN — DONEPEZIL HYDROCHLORIDE 10 MG: 5 TABLET, FILM COATED ORAL at 10:11

## 2024-11-18 NOTE — ASSESSMENT & PLAN NOTE
Malnutrition Type:  Context: chronic illness  Level: moderate    Related to (etiology):   Patient with history of dementia     Signs and Symptoms (as evidenced by):   75% of EEN / EPN for >/= 1 month; weight loss > 7.5% in 3 months; and mild muscle wasting meeting criteria per AAIM for moderate malnutrition.     Malnutrition Characteristic Summary:  Weight Loss (Malnutrition): greater than 7.5% in 3 months  Energy Intake (Malnutrition): less than 75% for greater than or equal to 3 months  Muscle Mass (Malnutrition): mild depletion      Interventions/Recommendations (treatment strategy):  1. Continue current diet as toelrated. Consider liberlizing should intake be < 50% most meals. 2. Continue Ensure + HP BID. 3.  to obtain dialy menu choices to encourage intake.    Nutrition Diagnosis Status:   New

## 2024-11-18 NOTE — SUBJECTIVE & OBJECTIVE
Interval History:  Pt seen and examined.  Calm this morning.  Confused, but oriented to self, tells me about her son.  Aware she is in the hospital.  Discussed next steps including placement.  Sodium level has normalized, as has creatinine.  We will stop IVFs.  Pt reports eating good with good appetite.    Review of Systems   Unable to perform ROS: Dementia   Constitutional:  Negative for chills and fever.   Respiratory:  Negative for cough and shortness of breath.    Psychiatric/Behavioral:  Positive for confusion.      Objective:     Vital Signs (Most Recent):  Temp: 98.4 °F (36.9 °C) (11/18/24 1106)  Pulse: 76 (11/18/24 1106)  Resp: 18 (11/18/24 1008)  BP: (!) 150/81 (11/18/24 1106)  SpO2: 96 % (11/18/24 1106) Vital Signs (24h Range):  Temp:  [98 °F (36.7 °C)-98.8 °F (37.1 °C)] 98.4 °F (36.9 °C)  Pulse:  [44-79] 76  Resp:  [18] 18  SpO2:  [94 %-98 %] 96 %  BP: (119-168)/(65-94) 150/81     Weight: 56 kg (123 lb 7.3 oz)  Body mass index is 19.93 kg/m².    Intake/Output Summary (Last 24 hours) at 11/18/2024 1121  Last data filed at 11/18/2024 0919  Gross per 24 hour   Intake 500 ml   Output 450 ml   Net 50 ml         Physical Exam  Vitals and nursing note reviewed.   Constitutional:       Appearance: Normal appearance.   HENT:      Head: Normocephalic and atraumatic.   Cardiovascular:      Rate and Rhythm: Normal rate and regular rhythm.   Pulmonary:      Effort: Pulmonary effort is normal.      Breath sounds: Normal breath sounds.   Abdominal:      General: Abdomen is flat. Bowel sounds are normal.      Palpations: Abdomen is soft.   Musculoskeletal:      Comments: Decreased use of lower extremities in bed, though able to move both equally   Skin:     General: Skin is warm and dry.      Capillary Refill: Capillary refill takes less than 2 seconds.   Neurological:      General: No focal deficit present.      Mental Status: She is alert.      Comments: Oriented to person, son, no she is in a hospital.  Disoriented to  time, situation.  Calm.             Significant Labs: All pertinent labs within the past 24 hours have been reviewed.  CBC:   Recent Labs   Lab 11/16/24  1322 11/17/24  0547 11/18/24  0626   WBC 9.27 7.23 6.26   HGB 12.2 12.0 11.7*   HCT 39.1 40.5 37.8    165 176     CMP:   Recent Labs   Lab 11/16/24  1322 11/17/24  0547 11/17/24  1212 11/17/24  1753 11/18/24  0000 11/18/24  0626   * 148*   < > 145 144 140  141   K 3.0* 3.1*  --   --   --  4.1    110  --   --   --  104   CO2 33* 30*  --   --   --  29   GLU 99 110  --   --   --  95   BUN 33* 24*  --   --   --  14   CREATININE 1.5* 1.3  --   --   --  1.2   CALCIUM 12.1* 10.9*  --   --   --  10.3   PROT 7.7 7.1  --   --   --  6.8   ALBUMIN 3.9 3.6  --   --   --  3.4*   BILITOT 0.5 0.4  --   --   --  0.7   ALKPHOS 65 59  --   --   --  63   AST 19 19  --   --   --  19   ALT 10 10  --   --   --  12   ANIONGAP 9 8  --   --   --  7*    < > = values in this interval not displayed.       Significant Imaging: I have reviewed all pertinent imaging results/findings within the past 24 hours.

## 2024-11-18 NOTE — CONSULTS
Replaced by [facility]  Wound Care    Patient Name:  Alondra Pacheco   MRN:  [id number]  Date: 11/18/2024  Diagnosis: Acute on chronic renal failure    History:     Past Medical History:   Diagnosis Date    AAA (abdominal aortic aneurysm)     Arthritis     Rheumatoid arthritis(714.0)     Stroke        Allergies as of 11/16/2024 - Unable to Assess 11/16/2024   Allergen Reaction Noted    Anesthesia s/i-40 (propofol) [propofol]  04/23/2020       WOC Assessment Details/Treatment     Wound care consulted for wounds present on admit. Rounded with Dr. Vasquez this am for skin assessment. Head to toe assessment completed. Patient is incontinent of bowel and bladder. Patient has non blanchable areas to bilateral buttocks and sacrum. There are multiple dried eschar areas to bilateral lower extremities consolidated to the anterior knee areas. Left heel with callus noted as well. Offloaded bilateral heels from bed with pillow. Triad applied to bilateral buttocks, sacrum, bilateral knees and left posterior heel. Wound care will continue to follow up as needed throughout hospital stay.        11/18/24 1628   WOCN Assessment   WOCN Total Time (mins) 30   Visit Date 11/18/24   Visit Time 1100   Consult Type New   WOCN Speciality Wound   Wound pressure;At risk for pressure Injury;other   Intervention assessed;applied;chart review;coordination of care;orders;team conference   Teaching on-going   Skin Interventions   Pressure Reduction Devices positioning supports utilized   Pressure Reduction Techniques heels elevated off bed   Pressure Injury Prevention    Check Moisture Management Pad Done   Sacral Foam Dressing Patient incontinent, barrier cream applied        Wound 11/16/24 1930 Pressure Injury Left Heel   Date First Assessed/Time First Assessed: 11/16/24 1930   Present on Original Admission: Yes  Primary Wound Type: Pressure Injury  Side: Left  Location: Heel   Wound Image     Pressure Injury Stage 1   Dressing Appearance  Open to air   Drainage Amount None   Care Cleansed with:;Antimicrobial agent;Applied:;Skin Barrier   Dressing Applied  (Triad)   Off Loading Other (see comments)  (offloading with Pillows)        Wound 11/16/24 1930 Abrasion(s) Right anterior Knee   Date First Assessed/Time First Assessed: 11/16/24 1930   Present on Original Admission: Yes  Primary Wound Type: Abrasion(s)  Side: Right  Orientation: anterior  Location: Knee   Wound Image    Dressing Appearance Open to air   Drainage Amount None   Care Cleansed with:;Antimicrobial agent;Applied:;Skin Barrier   Dressing Applied  (Triad)        Wound 11/16/24 1930 Abrasion(s) Left anterior Knee   Date First Assessed/Time First Assessed: 11/16/24 1930   Present on Original Admission: Yes  Primary Wound Type: Abrasion(s)  Side: Left  Orientation: anterior  Location: Knee   Wound Image    Dressing Appearance Open to air   Drainage Amount None   Appearance Red   Care Cleansed with:;Antimicrobial agent;Applied:;Skin Barrier   Dressing Applied  (Triad)        Wound 11/16/24 1930 Pressure Injury Buttocks   Date First Assessed/Time First Assessed: 11/16/24 1930   Present on Original Admission: Yes  Primary Wound Type: Pressure Injury  Location: Buttocks   Wound Image    (Bilateral buttocks and sacrum)   Pressure Injury Stage 1   Care Cleansed with:;Antimicrobial agent;Applied:;Skin Barrier   Dressing Applied  (Triad)        Wound 11/16/24 Pressure Injury Sacral spine   Date First Assessed: 11/16/24   Present on Original Admission: Yes  Primary Wound Type: Pressure Injury  Location: Sacral spine   Wound Image    Pressure Injury Stage 1   Dressing Appearance Open to air   Drainage Amount None   Appearance Red   Care Cleansed with:;Antimicrobial agent;Applied:;Skin Barrier   Dressing Applied  (Triad)    (Insert flowsheet data here)    Recommendations made to primary team for Triad to all areas and leave open to air. Orders placed as per Dr. Vasquez.    11/18/2024

## 2024-11-18 NOTE — PLAN OF CARE
Problem: Adult Inpatient Plan of Care  Goal: Plan of Care Review  Outcome: Progressing  Goal: Patient-Specific Goal (Individualized)  Outcome: Progressing  Goal: Absence of Hospital-Acquired Illness or Injury  Outcome: Progressing  Goal: Optimal Comfort and Wellbeing  Outcome: Progressing  Goal: Readiness for Transition of Care  Outcome: Progressing     Problem: Acute Kidney Injury/Impairment  Goal: Fluid and Electrolyte Balance  Outcome: Progressing  Goal: Improved Oral Intake  Outcome: Progressing  Goal: Effective Renal Function  Outcome: Progressing     Problem: Wound  Goal: Optimal Coping  Outcome: Progressing  Goal: Optimal Functional Ability  Outcome: Progressing  Goal: Absence of Infection Signs and Symptoms  Outcome: Progressing  Goal: Improved Oral Intake  Outcome: Progressing  Goal: Optimal Pain Control and Function  Outcome: Progressing  Goal: Skin Health and Integrity  Outcome: Progressing  Goal: Optimal Wound Healing  Outcome: Progressing     Problem: Skin Injury Risk Increased  Goal: Skin Health and Integrity  Outcome: Progressing

## 2024-11-18 NOTE — CONSULTS
Chief complaint:  Fatigue (Family tells EMS she has become weaker, not eating, and they are not sure if they can care for her at this point.)      HPI:  Alondra Pacheco is a 79 y.o. female presenting with BL buttock and sacrum stage 1 pressure ulcers  Multiple dried eschars of the BLE  Left heel callus. All wounds were POA. Pt bumps her legs at home and gets the small wounds that heal by themselves. Her foot has a callus, which is painful, but nothing open. Her buttocks and sacrum are developing sores from sitting at home, and all wounds were POA. No other complaints today    PMH:  As per HPI and below:  Past Medical History:   Diagnosis Date    AAA (abdominal aortic aneurysm)     Arthritis     Rheumatoid arthritis(714.0)     Stroke        Social History     Socioeconomic History    Marital status:    Tobacco Use    Smoking status: Former     Current packs/day: 0.00     Average packs/day: 1.5 packs/day for 12.0 years (18.0 ttl pk-yrs)     Types: Cigarettes     Start date: 1963     Quit date: 1975     Years since quittin.8    Smokeless tobacco: Never   Substance and Sexual Activity    Alcohol use: No    Drug use: No    Sexual activity: Not Currently     Social Drivers of Health     Financial Resource Strain: Patient Declined (2024)    Overall Financial Resource Strain (CARDIA)     Difficulty of Paying Living Expenses: Patient declined   Food Insecurity: Patient Declined (2024)    Hunger Vital Sign     Worried About Running Out of Food in the Last Year: Patient declined     Ran Out of Food in the Last Year: Patient declined   Transportation Needs: No Transportation Needs (2024)    TRANSPORTATION NEEDS     Transportation : No   Physical Activity: Inactive (10/28/2024)    [facility] from [facility] and [facility]    Exercise Vital Sign     Days of Exercise per Week: 0 days     Minutes of Exercise per Session: 0 min   Stress: Patient Declined (2024)  Vitamin C  500-1000mg morning    The glucose revolution by Delphine Blank  (glucose goddess)  -veggies first    The obesity code  by Dr Jake Vick   (you tube)        [facility] [place] of Occupational Health - Occupational Stress Questionnaire     Feeling of Stress : Patient declined   Housing Stability: Patient Declined (11/16/2024)    Housing Stability Vital Sign     Unable to Pay for Housing in the Last Year: Patient declined     Homeless in the Last Year: Patient declined       Past Surgical History:   Procedure Laterality Date    CORONARY ARTERY BYPASS GRAFT      INTRAMEDULLARY RODDING OF TROCHANTER OF FEMUR Right 8/3/2024    Procedure: INSERTION, INTRAMEDULLARY IVONE, FEMUR, TROCHANTER;  Surgeon: Rod England MD;  Location: [facility];  Service: Orthopedics;  Laterality: Right;    TOTAL KNEE ARTHROPLASTY         Family History   Problem Relation Name Age of Onset    Cancer Father      Diabetes Sister      Heart attack Paternal Uncle         Review of patient's allergies indicates:   Allergen Reactions    Anesthesia s/i-40 (propofol) [propofol]        No current facility-administered medications on file prior to encounter.     Current Outpatient Medications on File Prior to Encounter   Medication Sig Dispense Refill    alendronate (FOSAMAX) 70 MG tablet TAKE 1 TABLET(70 MG) BY MOUTH EVERY 7 DAYS (Patient taking differently: Take 70 mg by mouth every 7 days.) 12 tablet 4    atorvastatin (LIPITOR) 80 MG tablet TAKE ONE TABLET BY MOUTH EVERY DAY (Patient taking differently: Take 80 mg by mouth once daily.) 90 tablet 1    calcium carbonate (OS-NEETA) 600 mg calcium (1,500 mg) Tab Take 600 mg by mouth once daily.      clopidogreL (PLAVIX) 75 mg tablet TAKE 1 TABLET BY MOUTH EVERY DAY (Patient taking differently: Take 75 mg by mouth once daily. TAKE 1 TABLET BY MOUTH EVERY DAY) 90 tablet 1    cyproheptadine (PERIACTIN) 4 mg tablet Take 1 tablet (4 mg total) by mouth every evening. 90 tablet 3    diclofenac sodium (VOLTAREN) 1 % Gel Apply 2 g topically 4 (four) times daily. (Patient taking differently: Apply 2 g topically 4 (four) times daily. Apply to bilateral knees.) 200 g 5     "donepeziL (ARICEPT) 10 MG tablet TAKE ONE TABLET BY MOUTH EVERY DAY (Patient taking differently: Take 10 mg by mouth once daily.) 90 tablet 2    fenofibrate 160 MG Tab Take 160 mg by mouth once daily.      lamoTRIgine (LAMICTAL) 100 MG tablet Take 100 mg by mouth 2 (two) times daily.       levETIRAcetam (KEPPRA) 250 MG Tab Take 250 mg by mouth 2 (two) times daily.      levothyroxine (SYNTHROID) 75 MCG tablet TAKE ONE TABLET BY MOUTH ONCE DAILY (Patient taking differently: Take 75 mcg by mouth before breakfast.) 90 tablet 1    losartan (COZAAR) 50 MG tablet TAKE ONE TABLET BY MOUTH EVERY DAY (Patient taking differently: Take 50 mg by mouth once daily.) 90 tablet 1    metoprolol succinate (TOPROL-XL) 25 MG 24 hr tablet TAKE ONE TABLET BY MOUTH ONCE DAILY (Patient taking differently: Take 25 mg by mouth once daily.) 90 tablet 1    multivitamin capsule Take 1 capsule by mouth once daily.      pantoprazole (PROTONIX) 40 MG tablet Take 1 tablet (40 mg total) by mouth once daily. 90 tablet 1       ROS: As per HPI and below:  Pertinent items are noted in HPI.      Physical Exam:     Vitals:    24 0456 24 0732 24 1008 24 1106   BP: (!) 153/85 (!) 144/77  (!) 150/81   Pulse: 79 71  76   Resp:   18    Temp: 98 °F (36.7 °C) 98.5 °F (36.9 °C)  98.4 °F (36.9 °C)   TempSrc: Oral Oral  Oral   SpO2: 98% (!) 94%  96%   Weight:       Height:           BP  Min: 114/69  Max: 180/87  Temp  Av.4 °F (36.9 °C)  Min: 97.9 °F (36.6 °C)  Max: 98.8 °F (37.1 °C)  Pulse  Av.8  Min: 44  Max: 100  Resp  Av.2  Min: 16  Max: 21  SpO2  Av.5 %  Min: 86 %  Max: 99 %  Height  Av' 6" (167.6 cm)  Min: 5' 6" (167.6 cm)  Max: 5' 6" (167.6 cm)  Weight  Av.5 kg (131 lb 2.8 oz)  Min: 56 kg (123 lb 7.3 oz)  Max: 63 kg (138 lb 14.2 oz)    Body mass index is 19.93 kg/m².          General:             Well developed, well nourished, no apparent distress  HEENT:              NCAT, no JVD, mucous membranes moist, " EOM intact  Cardiovascular:  Regular rate and rhythm, normal S1, normal S2, No murmurs, rubs, or gallops  Respiratory:        Normal breath sounds, no wheezes, no rales, no rhonchi  Abdomen:           Bowel sounds present, non tender, non distended, no masses, no hepatojugular reflux  Extremities:        No clubbing, no cyanosis, no edema  Vascular:            2+ b/l radial.  Peripheral pulses intact.  No carotid bruits.  Neurological:      No focal deficits  Skin:                   No obvious rashes or erythema, BL buttock and sacrum stage 1 pressure ulcers  Multiple dried eschars of the BLE  Left heel callus                                    Lab Results   Component Value Date    WBC 6.26 11/18/2024    HGB 11.7 (L) 11/18/2024    HCT 37.8 11/18/2024    MCV 86 11/18/2024     11/18/2024     Lab Results   Component Value Date    CHOL 255 (H) 02/01/2024    CHOL 159 07/24/2023    CHOL 146 08/01/2022     Lab Results   Component Value Date    HDL 67 02/01/2024    HDL 54 07/24/2023    HDL 57 08/01/2022     Lab Results   Component Value Date    LDLCALC 155.0 02/01/2024    LDLCALC 89.8 07/24/2023    LDLCALC 71.4 08/01/2022     Lab Results   Component Value Date    TRIG 165 (H) 02/01/2024    TRIG 76 07/24/2023    TRIG 88 08/01/2022     Lab Results   Component Value Date    CHOLHDL 26.3 02/01/2024    CHOLHDL 34.0 07/24/2023    CHOLHDL 39.0 08/01/2022     CMP  Recent Labs   Lab 11/18/24  0626   GLU 95   CALCIUM 10.3   ALBUMIN 3.4*   PROT 6.8     141   K 4.1   CO2 29      BUN 14   CREATININE 1.2   ALKPHOS 63   ALT 12   AST 19   BILITOT 0.7      Lab Results   Component Value Date    TSH 1.389 11/16/2024         Assessment and Recommendations       Diagnoses:    BL buttock and sacrum stage 1 pressure ulcers  Multiple dried eschars of the BLE  Left heel callus    Plan:  Triad to all wounds q shift          Complexity:    moderate

## 2024-11-18 NOTE — ASSESSMENT & PLAN NOTE
Patient's most recent potassium results are listed below.   Recent Labs     11/16/24  1322 11/17/24  0547 11/18/24  0626   K 3.0* 3.1* 4.1       Plan  - Replete potassium per protocol  - Monitor potassium  BNP 2300  - Patient's hypokalemia is stable - 25 meq potassium bicarb x4 doses ordered

## 2024-11-18 NOTE — ASSESSMENT & PLAN NOTE
LORETTA is likely due to pre-renal azotemia due to intravascular volume depletion. Baseline creatinine is  1.01 . Most recent creatinine and eGFR are listed below.     Most Recent Today 3 mo ago 3 mo ago 3 mo ago 3 mo ago 9 mo ago 1 yr ago   CHEM PROFILE   BUN 33 High  (Today) 33 High  25 High  27 High  23 High  26 High  25 High  24 High    Creatinine 1.5 High  (Today) 1.5 High  1.01 1.24 0.99 1.09 1.2 1.0   eGFR if non  >60.0  (3 yr ago)          eGFR if  >60.0 (3 yr ago)             Plan  - LORETTA is improving  - Avoid nephrotoxins and renally dose meds for GFR listed above  - Monitor urine output, serial BMP, and adjust therapy as needed  - IVF stopped on 11/18

## 2024-11-18 NOTE — PROGRESS NOTES
Pharmacist Renal Dose Adjustment Note    Alondra Pacheco is a 79 y.o. female being treated with the medication Enoxaparin    Patient Data:    Vital Signs (Most Recent):  Temp: 98.4 °F (36.9 °C) (11/18/24 1106)  Pulse: 76 (11/18/24 1106)  Resp: 18 (11/18/24 1008)  BP: (!) 150/81 (11/18/24 1106)  SpO2: 96 % (11/18/24 1106) Vital Signs (72h Range):  Temp:  [97.9 °F (36.6 °C)-98.8 °F (37.1 °C)]   Pulse:  []   Resp:  [16-21]   BP: (114-180)/()   SpO2:  [86 %-99 %]      Recent Labs   Lab 11/16/24  1322 11/17/24  0547 11/18/24  0626   CREATININE 1.5* 1.3 1.2     Serum creatinine: 1.2 mg/dL 11/18/24 0626  Estimated creatinine clearance: 32.5 mL/min    Enoxaparin 30 mg Q24H will be changed to Enoxaparin 40 mg Q24H per renal dose adjustment policy.    Pharmacist's Name: Alexis Hagan  Pharmacist's Extension: [phone]

## 2024-11-18 NOTE — PLAN OF CARE
Problem: Physical Therapy  Goal: Physical Therapy Goal  Description: Goals to be met by: 24     Patient will increase functional independence with mobility by performin. Supine to sit with Moderate Assistance  2. Sit to stand transfer with Moderate Assistance  3. Bed to chair transfer with Moderate Assistance using Rolling Walker  4. Gait  x 5 feet with Moderate Assistance using Rolling Walker.     Outcome: Progressing

## 2024-11-18 NOTE — PLAN OF CARE
Patient accepted by [facility] via [facility].  Patient declined by [facility] via [facility] system.    SW left voice message for patient's son to return call regarding discharge planning.       11/18/24 1521   Post-Acute Status   Post-Acute Authorization Placement   Post-Acute Placement Status Pending post-acute provider review/more information requested   Discharge Delays (!) Patient and Family Barriers

## 2024-11-18 NOTE — PT/OT/SLP PROGRESS
Physical Therapy Treatment    Patient Name:  Alondra Pacheco   MRN:  [id number]    Recommendations:     Discharge Recommendations: Moderate Intensity Therapy  Discharge Equipment Recommendations: to be determined by next level of care  Barriers to discharge:  Decreased caregiver support and RLE pain limiting tolerance for functional mobility     Assessment:     Alondra Pacheco is a 79 y.o. female admitted with a medical diagnosis of Acute on chronic renal failure.  She presents with the following impairments/functional limitations: weakness, impaired endurance, impaired self care skills, impaired functional mobility, impaired balance, impaired cognition, decreased lower extremity function, decreased safety awareness, orthopedic precautions. Patient is agreeable to participation with PT treatment. She requires MaxA for supine to sit transfer and ModA for sitting balance. She is anxious and fearful of movement and falling. She returned to bed with bed alarm on and all needs met.     Rehab Prognosis: Fair; patient would benefit from acute skilled PT services to address these deficits and reach maximum level of function.    Recent Surgery: * No surgery found *      Plan:     During this hospitalization, patient to be seen 3 x/week to address the identified rehab impairments via gait training, therapeutic activities, therapeutic exercises and progress toward the following goals:    Plan of Care Expires:  12/17/24    Subjective     Chief Complaint: afraid to fall  Patient/Family Comments/goals: lie down  Pain/Comfort:  Pain Rating 1:  (no pain verbalized)      Objective:     Communicated with DENY Elmore prior to session.  Patient found HOB elevated with bed alarm, PureWick, telemetry upon PT entry to room.     General Precautions: Standard, fall (dementia)  Orthopedic Precautions: RLE weight bearing as tolerated (R IM soco of femur 8/3/24 at [facility] per chart review)  Braces: N/A  Respiratory Status: Room air      Functional Mobility:  Bed Mobility:     Scooting: maximal assistance  Supine to Sit: maximal assistance      AM-PAC 6 CLICK MOBILITY          Treatment & Education:  Patient was educated on the importance of OOB activity and functional mobility to negate negative effects of prolonged bed rest during hospitalization, safe transfers, and D/C planning     Patient left HOB elevated with all lines intact, call button in reach, bed alarm on, and RN notified..    GOALS:   Multidisciplinary Problems       Physical Therapy Goals          Problem: Physical Therapy    Goal Priority Disciplines Outcome Interventions   Physical Therapy Goal     PT, PT/OT Progressing    Description: Goals to be met by: 24     Patient will increase functional independence with mobility by performin. Supine to sit with Moderate Assistance  2. Sit to stand transfer with Moderate Assistance  3. Bed to chair transfer with Moderate Assistance using Rolling Walker  4. Gait  x 5 feet with Moderate Assistance using Rolling Walker.                          Time Tracking:     PT Received On: 24  PT Start Time: 1024     PT Stop Time: 1047  PT Total Time (min): 23 min     Billable Minutes: Therapeutic Activity 23    Treatment Type: Treatment  PT/PTA: PT     Number of PTA visits since last PT visit: 0     2024

## 2024-11-18 NOTE — CONSULTS
[facility]  Adult Nutrition   Consult Note (Initial Assessment)     SUMMARY     Recommendations  Recommendation/Intervention:   1. Continue current diet as toelrated. Consider liberlizing should intake be < 50% most meals.   2. Continue Ensure + HP BID.   3.  to obtain dialy menu choices to encourage intake.    Goals: 1. Intake will be >/= 75% EEN / EPN and lab values will trend to target range by follow up.  Nutrition Goal Status: new    Nutrition Diagnosis PES Statement:     Malnutrition Type:  Context: chronic illness  Level: moderate     Related to (etiology):   Patient with history of dementia      Signs and Symptoms (as evidenced by):   75% of EEN / EPN for >/= 1 month; weight loss > 7.5% in 3 months; and mild muscle wasting meeting criteria per AAIM for moderate malnutrition.      Malnutrition Characteristic Summary:  Weight Loss (Malnutrition): greater than 7.5% in 3 months  Energy Intake (Malnutrition): less than 75% for greater than or equal to 3 months  Muscle Mass (Malnutrition): mild depletion        Interventions/Recommendations (treatment strategy):  1. Continue current diet as toelrated. Consider liberlizing should intake be < 50% most meals. 2. Continue Ensure + HP BID. 3.  to obtain dialy menu choices to encourage intake.     Nutrition Diagnosis Status:   New    Dietitian Rounds Brief  MST 3 Unsure of weight loss with poor intake due to decreased appetite + Consult for FTT at risk for malnutrition  11%, 15 lbs in 3 months per Epic and family report of poor intake for >/= 1 month.  Patient resting at visit, states he ate her breakfast and drinks Ensure +HP. . 50% consumed per nursing notes.NFPE not performed due to patient unable to provide permission. Pt assessed per visual. RD to monitor for intake, labs, and status change PRN.    Nutrition Related Social Determinants of Health: SDOH: Unable to assess at this time.   Food Insecurity: Patient Declined (11/16/2024)     "Hunger Vital Sign     Worried About Running Out of Food in the Last Year: Patient declined     Ran Out of Food in the Last Year: Patient declined       Malnutrition Assessment  Malnutrition Context: chronic illness  Malnutrition Level: moderate  Skin (Micronutrient): thinned       Weight Loss (Malnutrition): greater than 7.5% in 3 months  Energy Intake (Malnutrition): less than 75% for greater than or equal to 3 months  Muscle Mass (Malnutrition): mild depletion   Orbital Region (Subcutaneous Fat Loss): well nourished  Upper Arm Region (Subcutaneous Fat Loss): well nourished   [place] Region (Muscle Loss): mild depletion  Clavicle Bone Region (Muscle Loss): mild depletion  Clavicle and Acromion Bone Region (Muscle Loss): mild depletion  Dorsal Hand (Muscle Loss): mild depletion                 Diet order:   Current Diet Order: Cardiac diet   Oral Nutrition Supplement: Ensure + HP BID             Evaluation of Received Nutrient/Fluid Intake  Energy Calories Required: not meeting needs  Protein Required: not meeting needs  Fluid Required: not meeting needs  Tolerance: tolerating     % Intake of Estimated Energy Needs: 25 - 50 %  % Meal Intake: 25 - 50 %      Intake/Output Summary (Last 24 hours) at 11/18/2024 1412  Last data filed at 11/18/2024 0919  Gross per 24 hour   Intake 500 ml   Output 450 ml   Net 50 ml        Anthropometrics  Temp: 98.4 °F (36.9 °C)  Height Method: Stated  Height: 5' 6" (167.6 cm)  Height (inches): 66 in  Weight Method: Bed Scale  Weight: 56 kg (123 lb 7.3 oz)  Weight (lb): 123.46 lb  Ideal Body Weight (IBW), Female: 130 lb  % Ideal Body Weight, Female (lb): 94.97 %  BMI (Calculated): 19.9       Estimated/Assessed Needs  Weight Used For Calorie Calculations: 56 kg (123 lb 7.3 oz)  Energy Calorie Requirements (kcal): 2850-7892 kcal/kg (30-35 kcal/kg)  Energy Need Method: Kcal/kg  Protein Requirements: 56-84 gm/day (1.0-1.5 gm/kg)  Weight Used For Protein Calculations: 56 kg (123 lb 7.3 oz)   "   Estimated Fluid Requirement Method: RDA Method  RDA Method (mL): 1680       Reason for Assessment  Reason For Assessment: consult, other (see comments) (FTT at risk for malnutrition)  Diagnosis: renal disease  General Information Comments: Patient with past history of Dementia, HTN, HLD, RA, and AAA who presented to the ED via EMS with family reports of failure to thrive.  According to EMS reports since patient's discharge 10/29/24 she has gotten progressively weaker and has not been eating (per MD H&P note).  Nutrition Discharge Planning: Cardiac diet as tolerated. Ensure +HP chocolate BID.    Nutrition/Diet History  Spiritual, Cultural Beliefs, [employer] Practices, Values that Affect Care: no  Food Allergies: other (see comments), NKFA (unable to assess)  Factors Affecting Nutritional Intake: None identified at this time    Nutrition Risk Screen  Nutrition Risk Screen: reduced oral intake over the last month, unintentional loss of 10 lbs or more in the past 2 months       Wound 11/16/24 1930 Left Knee-Wound Image: Images linked       Wound 11/16/24 1930 Buttocks-Wound Image: Images linked       Wound 11/16/24 1930 Left Heel-Wound Image: Images linked       Wound 11/16/24 1930 Right Knee-Wound Image: Images linked  MST Score: 3  Have you recently lost weight without trying?: Unsure  Weight loss score: 2  Have you been eating poorly because of a decreased appetite?: Yes  Appetite score: 1       Weight History:  Wt Readings from Last 10 Encounters:   11/17/24 56 kg (123 lb 7.3 oz)   08/03/24 63 kg (138 lb 14.2 oz)   07/28/23 56.7 kg (125 lb)   01/27/23 54.4 kg (120 lb)   10/27/22 52.4 kg (115 lb 9.6 oz)   07/20/22 51.2 kg (112 lb 14.4 oz)   02/10/22 57.2 kg (126 lb)   12/07/21 57.6 kg (126 lb 14.4 oz)   09/15/21 57.6 kg (126 lb 14.4 oz)   06/11/21 60.5 kg (133 lb 6.4 oz)        Lab/Procedures/Meds: Pertinent Labs/Meds Reviewed    Medications:Pertinent Medications Reviewed  Scheduled Meds:   atorvastatin  80 mg Oral  Daily    clopidogreL  75 mg Oral Daily    cyproheptadine  4 mg Oral QHS    donepeziL  10 mg Oral Daily    enoxparin  30 mg Subcutaneous Daily    lactulose  20 g Oral TID    lamoTRIgine  100 mg Oral BID    levETIRAcetam  250 mg Oral BID    levothyroxine  75 mcg Oral Before breakfast    metoprolol succinate  25 mg Oral Daily    multivitamin  1 tablet Oral Daily     Continuous Infusions:  PRN Meds:.  Current Facility-Administered Medications:     acetaminophen, 650 mg, Oral, Q8H PRN    acetaminophen, 650 mg, Oral, Q4H PRN    aluminum-magnesium hydroxide-simethicone, 30 mL, Oral, QID PRN    dextrose 50%, 12.5 g, Intravenous, PRN    dextrose 50%, 25 g, Intravenous, PRN    glucagon (human recombinant), 1 mg, Intramuscular, PRN    glucose, 16 g, Oral, PRN    glucose, 24 g, Oral, PRN    hydrALAZINE, 10 mg, Intravenous, Q4H PRN    HYDROcodone-acetaminophen, 1 tablet, Oral, Q6H PRN    magnesium oxide, 800 mg, Oral, PRN    magnesium oxide, 800 mg, Oral, PRN    melatonin, 6 mg, Oral, Nightly PRN    naloxone, 0.02 mg, Intravenous, PRN    ondansetron, 4 mg, Intravenous, Q6H PRN    potassium bicarbonate, 35 mEq, Oral, PRN    potassium bicarbonate, 50 mEq, Oral, PRN    potassium bicarbonate, 60 mEq, Oral, PRN    potassium, sodium phosphates, 2 packet, Oral, PRN    potassium, sodium phosphates, 2 packet, Oral, PRN    potassium, sodium phosphates, 2 packet, Oral, PRN    sodium chloride 0.9%, 2 mL, Intravenous, PRN    Labs: Pertinent Labs Reviewed  Clinical Chemistry:  Recent Labs   Lab 11/16/24  1322 11/17/24  0547 11/17/24  1212 11/18/24  0626   * 148*   < > 140  141   K 3.0* 3.1*  --  4.1    110  --  104   CO2 33* 30*  --  29   GLU 99 110  --  95   BUN 33* 24*  --  14   CREATININE 1.5* 1.3  --  1.2   CALCIUM 12.1* 10.9*  --  10.3   PROT 7.7 7.1  --  6.8   ALBUMIN 3.9 3.6  --  3.4*   BILITOT 0.5 0.4  --  0.7   ALKPHOS 65 59  --  63   AST 19 19  --  19   ALT 10 10  --  12   ANIONGAP 9 8  --  7*   MG 2.0 1.8  --  1.8  "  PHOS 2.8  --   --   --     < > = values in this interval not displayed.     CBC:   Recent Labs   Lab 11/18/24  0626   WBC 6.26   RBC 4.39   HGB 11.7*   HCT 37.8      MCV 86   MCH 26.7*   MCHC 31.0*     Lipid Panel:  No results for input(s): "CHOL", "HDL", "LDLCALC", "TRIG", "CHOLHDL" in the last 168 hours.  Cardiac Profile:  No results for input(s): "BNP", "CPK", "CPKMB", "TROPONINI", "CKTOTAL" in the last 168 hours.  Inflammatory Labs:  No results for input(s): "CRP" in the last 168 hours.  Diabetes:  No results for input(s): "HGBA1C", "POCTGLUCOSE" in the last 168 hours.  Thyroid & Parathyroid:  Recent Labs   Lab 11/16/24  1322   TSH 1.389       Monitor and Evaluation  Food and Nutrient Intake: food and beverage intake, energy intake  Food and Nutrient Adminstration: diet order  Knowledge/Beliefs/Attitudes: food and nutrition knowledge/skill  Physical Activity and Function: nutrition-related ADLs and IADLs  Anthropometric Measurements: weight change, weight, body mass index  Biochemical Data, Medical Tests and Procedures: gastrointestinal profile, glucose/endocrine profile, lipid profile, inflammatory profile, electrolyte and renal panel  Nutrition-Focused Physical Findings: overall appearance     Nutrition Risk  Level of Risk/Frequency of Follow-up:  (1 x / week)     Nutrition Follow-Up  RD Follow-up?: Yes            "

## 2024-11-18 NOTE — ASSESSMENT & PLAN NOTE
Hypernatremia is likely due to Dehydration. The patient's most recent sodium results are listed below.  Recent Labs     11/17/24  1753 11/18/24  0000 11/18/24  0626    144 140  141       Plan  - Aim to correct the sodium by 8-10mEq in 24 hours.   - sodium has normalized.  We will stop IVFs, continue oral intake and monitor sodium daily.

## 2024-11-18 NOTE — PROGRESS NOTES
Counts include 234 beds at the [facility] Medicine  Progress Note    Patient Name: Alondra Pacheco  MRN: [id number]  Patient Class: IP- Inpatient   Admission Date: 11/16/2024  Length of Stay: 2 days  Attending Physician: Ani Calabrese DO  Primary Care Provider: Jak Park MD        Subjective:     Principal Problem:Acute on chronic renal failure        HPI:  79-year-old female with pMHx of dementia, HTN, HLD, RA, and AAA who presented to the ED via EMS with family reports of failure to thrive.  According to EMS reports since patient's discharge 10/29/24 she has gotten progressively weaker and has not been eating.  Family also reports that the patient has been out of her medication, Aricept x5 days.  Son reported to EMS that he is unsure what he can care for his mother at this point.  Blood work performed in the ED with a sodium of 147, potassium 3, CO2 33, BUN 33, creatinine 1.5, calcium 12.1, GFR 34.8.  Urine is negative for infection.  Patient will be admitted for further evaluation and treatment.    Overview/Hospital Course:  Alondra Pacheco is an 79 year old female with PMH dementia, HLD, CKD stage 3, HTN, seizure disorder, RA, AAA, and stroke. She was admitted for acute on chronic renal failure and FTT. Patient started on IVF for hypernatremia and dehydration with improvement in labs-these were able to be discontinued.  PT/OT ordered. Registered dietician consulted.  Case Management facilitated placement.    Interval History:  Pt seen and examined.  Calm this morning.  Confused, but oriented to self, tells me about her son.  Aware she is in the hospital.  Discussed next steps including placement.  Sodium level has normalized, as has creatinine.  We will stop IVFs.  Pt reports eating good with good appetite.    Review of Systems   Unable to perform ROS: Dementia   Constitutional:  Negative for chills and fever.   Respiratory:  Negative for cough and shortness of breath.    Psychiatric/Behavioral:   Positive for confusion.      Objective:     Vital Signs (Most Recent):  Temp: 98.4 °F (36.9 °C) (11/18/24 1106)  Pulse: 76 (11/18/24 1106)  Resp: 18 (11/18/24 1008)  BP: (!) 150/81 (11/18/24 1106)  SpO2: 96 % (11/18/24 1106) Vital Signs (24h Range):  Temp:  [98 °F (36.7 °C)-98.8 °F (37.1 °C)] 98.4 °F (36.9 °C)  Pulse:  [44-79] 76  Resp:  [18] 18  SpO2:  [94 %-98 %] 96 %  BP: (119-168)/(65-94) 150/81     Weight: 56 kg (123 lb 7.3 oz)  Body mass index is 19.93 kg/m².    Intake/Output Summary (Last 24 hours) at 11/18/2024 1121  Last data filed at 11/18/2024 0919  Gross per 24 hour   Intake 500 ml   Output 450 ml   Net 50 ml         Physical Exam  Vitals and nursing note reviewed.   Constitutional:       Appearance: Normal appearance.   HENT:      Head: Normocephalic and atraumatic.   Cardiovascular:      Rate and Rhythm: Normal rate and regular rhythm.   Pulmonary:      Effort: Pulmonary effort is normal.      Breath sounds: Normal breath sounds.   Abdominal:      General: Abdomen is flat. Bowel sounds are normal.      Palpations: Abdomen is soft.   Musculoskeletal:      Comments: Decreased use of lower extremities in bed, though able to move both equally   Skin:     General: Skin is warm and dry.      Capillary Refill: Capillary refill takes less than 2 seconds.   Neurological:      General: No focal deficit present.      Mental Status: She is alert.      Comments: Oriented to person, son, no she is in a hospital.  Disoriented to time, situation.  Calm.             Significant Labs: All pertinent labs within the past 24 hours have been reviewed.  CBC:   Recent Labs   Lab 11/16/24  1322 11/17/24  0547 11/18/24  0626   WBC 9.27 7.23 6.26   HGB 12.2 12.0 11.7*   HCT 39.1 40.5 37.8    165 176     CMP:   Recent Labs   Lab 11/16/24  1322 11/17/24  0547 11/17/24  1212 11/17/24  1753 11/18/24  0000 11/18/24  0626   * 148*   < > 145 144 140  141   K 3.0* 3.1*  --   --   --  4.1    110  --   --   --  104    CO2 33* 30*  --   --   --  29   GLU 99 110  --   --   --  95   BUN 33* 24*  --   --   --  14   CREATININE 1.5* 1.3  --   --   --  1.2   CALCIUM 12.1* 10.9*  --   --   --  10.3   PROT 7.7 7.1  --   --   --  6.8   ALBUMIN 3.9 3.6  --   --   --  3.4*   BILITOT 0.5 0.4  --   --   --  0.7   ALKPHOS 65 59  --   --   --  63   AST 19 19  --   --   --  19   ALT 10 10  --   --   --  12   ANIONGAP 9 8  --   --   --  7*    < > = values in this interval not displayed.       Significant Imaging: I have reviewed all pertinent imaging results/findings within the past 24 hours.    Assessment/Plan:      * Acute on chronic renal failure  LORETTA is likely due to pre-renal azotemia due to intravascular volume depletion. Baseline creatinine is  1.01 . Most recent creatinine and eGFR are listed below.     Most Recent Today 3 mo ago 3 mo ago 3 mo ago 3 mo ago 9 mo ago 1 yr ago   CHEM PROFILE   BUN 33 High  (Today) 33 High  25 High  27 High  23 High  26 High  25 High  24 High    Creatinine 1.5 High  (Today) 1.5 High  1.01 1.24 0.99 1.09 1.2 1.0   eGFR if non  >60.0  (3 yr ago)          eGFR if  >60.0 (3 yr ago)             Plan  - LORETTA is improving  - Avoid nephrotoxins and renally dose meds for GFR listed above  - Monitor urine output, serial BMP, and adjust therapy as needed  - IVF stopped on 11/18    ACP (advance care planning)  In light of the patients advanced and life limiting illness, I engaged the family in a conversation about the patient's preferences for care  at the very end of life. Patient's son expresses that patient is to have all resuscitation efforts performed in the event that her heart stops or she stops breathing.    However, if patient's health continues to decline and she develops organ failure and is unlikely to make any meaningful recovery, he would like to revisit code status.    I informed him that patient would be made a full code.  I spent a total of 10 minutes engaging the  "patient's son in this advance care planning discussion.     (Above from 11/17- Charu Oro NP)    Failure to thrive in adult  Consult case management/social work  Consult PT OT  Consult dietary  Encourage PO intake      Hypernatremia  Hypernatremia is likely due to Dehydration. The patient's most recent sodium results are listed below.  Recent Labs     11/17/24  1753 11/18/24  0000 11/18/24  0626    144 140  141       Plan  - Aim to correct the sodium by 8-10mEq in 24 hours.   - sodium has normalized.  We will stop IVFs, continue oral intake and monitor sodium daily.        Seizure disorder  Continue patient seizure medications      Hypertension, essential  Patients blood pressure range in the last 24 hours was: BP  Min: 114/69  Max: 180/87.The patient's inpatient anti-hypertensive regimen is listed below:  Current Antihypertensives  metoprolol succinate (TOPROL-XL) 24 hr tablet 25 mg, Daily, Oral  hydrALAZINE injection 10 mg, Every 4 hours PRN, Intravenous    Plan  - BP is controlled, no changes needed to their regimen  - monitor    Hyperlipidemia   Patient is chronically on statin.will continue for now. Monitor clinically. Last LDL was   Lab Results   Component Value Date    LDLCALC 155.0 02/01/2024          Dementia  Resume patient's Aricept.   Patient with dementia with likely etiology of unknown dementia. Dementia is mild. The patient does not have signs of behavioral disturbance. Home dementia medications are Held or Continued: continued.. Continue non-pharmacologic interventions to prevent delirium (No VS between 11PM-5AM, activity during day, opening blinds, providing glasses/hearing aids, and up in chair during daytime). Will avoid narcotics and benzos unless absolutely necessary. PRN anti-psychotics are not prescribed to avoid self harm behaviors.      VTE Risk Mitigation (From admission, onward)           Ordered     enoxaparin injection 30 mg  Daily        Note to Pharmacy: Ht: 5' 6" (1.676 " m)  Wt: 63 kg (138 lb 14.2 oz)  Estimated Creatinine Clearance: 27.5 mL/min (A) (based on SCr of 1.5 mg/dL (H)).   Body mass index is 22.42 kg/m².    11/16/24 1717     IP VTE HIGH RISK PATIENT  Once         11/16/24 1717     Place sequential compression device  Until discontinued         11/16/24 1717                    Discharge Planning   BARRETT: 11/21/2024     Code Status: Full Code   Is the patient medically ready for discharge?:     Reason for patient still in hospital (select all that apply): Patient trending condition and Pending disposition  Discharge Plan A: Skilled Nursing Facility                  Flaquita Anthony NP  Department of Hospital Medicine   [facility]

## 2024-11-18 NOTE — PLAN OF CARE
Problem: Malnutrition  Goal: Improved Nutritional Intake  Intervention: Promote and Optimize Oral Intake  Flowsheets (Taken 11/18/2024 1412)  Nutrition Interventions:   supplemental drinks provided   diet liberalized   food preferences provided

## 2024-11-19 LAB
ALBUMIN SERPL BCP-MCNC: 3.4 G/DL (ref 3.5–5.2)
ALP SERPL-CCNC: 66 U/L (ref 55–135)
ALT SERPL W/O P-5'-P-CCNC: 12 U/L (ref 10–44)
ANION GAP SERPL CALC-SCNC: 9 MMOL/L (ref 8–16)
AST SERPL-CCNC: 21 U/L (ref 10–40)
BACTERIA UR CULT: NO GROWTH
BASOPHILS # BLD AUTO: 0.05 K/UL (ref 0–0.2)
BASOPHILS NFR BLD: 0.6 % (ref 0–1.9)
BILIRUB SERPL-MCNC: 0.6 MG/DL (ref 0.1–1)
BUN SERPL-MCNC: 15 MG/DL (ref 8–23)
CALCIUM SERPL-MCNC: 9.6 MG/DL (ref 8.7–10.5)
CHLORIDE SERPL-SCNC: 104 MMOL/L (ref 95–110)
CO2 SERPL-SCNC: 26 MMOL/L (ref 23–29)
CREAT SERPL-MCNC: 1.2 MG/DL (ref 0.5–1.4)
DIFFERENTIAL METHOD BLD: ABNORMAL
EOSINOPHIL # BLD AUTO: 0.2 K/UL (ref 0–0.5)
EOSINOPHIL NFR BLD: 2.7 % (ref 0–8)
ERYTHROCYTE [DISTWIDTH] IN BLOOD BY AUTOMATED COUNT: 15.2 % (ref 11.5–14.5)
EST. GFR  (NO RACE VARIABLE): 45.5 ML/MIN/1.73 M^2
GLUCOSE SERPL-MCNC: 92 MG/DL (ref 70–110)
HCT VFR BLD AUTO: 36.8 % (ref 37–48.5)
HGB BLD-MCNC: 11.4 G/DL (ref 12–16)
IMM GRANULOCYTES # BLD AUTO: 0.03 K/UL (ref 0–0.04)
IMM GRANULOCYTES NFR BLD AUTO: 0.4 % (ref 0–0.5)
LYMPHOCYTES # BLD AUTO: 1.8 K/UL (ref 1–4.8)
LYMPHOCYTES NFR BLD: 22.1 % (ref 18–48)
MAGNESIUM SERPL-MCNC: 1.8 MG/DL (ref 1.6–2.6)
MCH RBC QN AUTO: 26.6 PG (ref 27–31)
MCHC RBC AUTO-ENTMCNC: 31 G/DL (ref 32–36)
MCV RBC AUTO: 86 FL (ref 82–98)
MONOCYTES # BLD AUTO: 0.7 K/UL (ref 0.3–1)
MONOCYTES NFR BLD: 8.4 % (ref 4–15)
NEUTROPHILS # BLD AUTO: 5.3 K/UL (ref 1.8–7.7)
NEUTROPHILS NFR BLD: 65.8 % (ref 38–73)
NRBC BLD-RTO: 0 /100 WBC
PLATELET # BLD AUTO: 166 K/UL (ref 150–450)
PMV BLD AUTO: 12.3 FL (ref 9.2–12.9)
POTASSIUM SERPL-SCNC: 3.6 MMOL/L (ref 3.5–5.1)
PROT SERPL-MCNC: 6.6 G/DL (ref 6–8.4)
RBC # BLD AUTO: 4.29 M/UL (ref 4–5.4)
SODIUM SERPL-SCNC: 139 MMOL/L (ref 136–145)
WBC # BLD AUTO: 8.09 K/UL (ref 3.9–12.7)

## 2024-11-19 PROCEDURE — 83735 ASSAY OF MAGNESIUM: CPT | Performed by: NURSE PRACTITIONER

## 2024-11-19 PROCEDURE — 80053 COMPREHEN METABOLIC PANEL: CPT | Performed by: NURSE PRACTITIONER

## 2024-11-19 PROCEDURE — 25000003 PHARM REV CODE 250: Performed by: NURSE PRACTITIONER

## 2024-11-19 PROCEDURE — 97530 THERAPEUTIC ACTIVITIES: CPT

## 2024-11-19 PROCEDURE — 12000002 HC ACUTE/MED SURGE SEMI-PRIVATE ROOM

## 2024-11-19 PROCEDURE — 97535 SELF CARE MNGMENT TRAINING: CPT

## 2024-11-19 PROCEDURE — 36415 COLL VENOUS BLD VENIPUNCTURE: CPT | Performed by: NURSE PRACTITIONER

## 2024-11-19 PROCEDURE — 25000003 PHARM REV CODE 250: Performed by: INTERNAL MEDICINE

## 2024-11-19 PROCEDURE — 63600175 PHARM REV CODE 636 W HCPCS: Performed by: FAMILY MEDICINE

## 2024-11-19 PROCEDURE — 85025 COMPLETE CBC W/AUTO DIFF WBC: CPT | Performed by: NURSE PRACTITIONER

## 2024-11-19 RX ADMIN — ATORVASTATIN CALCIUM 80 MG: 40 TABLET, FILM COATED ORAL at 09:11

## 2024-11-19 RX ADMIN — CYPROHEPTADINE HYDROCHLORIDE 4 MG: 4 TABLET ORAL at 09:11

## 2024-11-19 RX ADMIN — LEVETIRACETAM 250 MG: 250 TABLET, FILM COATED ORAL at 09:11

## 2024-11-19 RX ADMIN — ENOXAPARIN SODIUM 40 MG: 40 INJECTION SUBCUTANEOUS at 05:11

## 2024-11-19 RX ADMIN — LAMOTRIGINE 100 MG: 100 TABLET ORAL at 09:11

## 2024-11-19 RX ADMIN — METOPROLOL SUCCINATE 25 MG: 25 TABLET, FILM COATED, EXTENDED RELEASE ORAL at 09:11

## 2024-11-19 RX ADMIN — DONEPEZIL HYDROCHLORIDE 10 MG: 5 TABLET, FILM COATED ORAL at 09:11

## 2024-11-19 RX ADMIN — LEVOTHYROXINE SODIUM 75 MCG: 0.03 TABLET ORAL at 06:11

## 2024-11-19 RX ADMIN — THERA TABS 1 TABLET: TAB at 09:11

## 2024-11-19 RX ADMIN — CLOPIDOGREL BISULFATE 75 MG: 75 TABLET, FILM COATED ORAL at 09:11

## 2024-11-19 RX ADMIN — LACTULOSE 20 G: 20 SOLUTION ORAL at 09:11

## 2024-11-19 NOTE — PT/OT/SLP EVAL
Occupational Therapy   Evaluation    Name: Alondra Pacheco  MRN: [id number]  Admitting Diagnosis: Acute on chronic renal failure  Recent Surgery: * No surgery found *    The primary encounter diagnosis was Dehydration. Diagnoses of LORETTA (acute kidney injury), Hypercalcemia, Failure to thrive in adult, Hypokalemia, Acute kidney injury superimposed on chronic kidney disease, and Chest pain were also pertinent to this visit.    Recommendations:     Discharge Recommendations: No Therapy Indicated  Discharge Equipment Recommendations:  hospital bed, lift device  Barriers to discharge:   (increased caregiver burden of care)    Assessment:     Alondra Pacheco is a 79 y.o. female with a medical diagnosis of Acute on chronic renal failure.  She presents with FTT, O x person only, not birthday. Pt followed simple 1 step commands though not able to carryover for functional tasks. Pt performed mobility Max A supine<>sit, sitting balance required Min A to maintain on EOB. Pt was guarded, fearful. G/hygiene performed with total assist-pt did not initiate task despite max cues, [place] assist. Decreased visual tracking and focusing attention noted. Continue with trial OT. Pt will need 24/7 caregiver assist at [place].  Performance deficits affecting function: weakness, impaired self care skills, impaired functional mobility, impaired endurance, gait instability, impaired balance, visual deficits, impaired cognition, decreased coordination, decreased upper extremity function, decreased lower extremity function, decreased safety awareness, decreased ROM, impaired coordination.      Rehab Prognosis: Fair; patient would benefit from acute skilled OT services to address these deficits and reach maximum level of function.       Plan:     Patient to be seen 3 x/week to address the above listed problems via self-care/home management, therapeutic activities, therapeutic exercises  Plan of Care Expires: 12/18/24  Plan of Care Reviewed with:  patient    Subjective     Chief Complaint: none  Patient/Family Comments/goals: I was sleeping. Pt agreeable.    Occupational Profile:  Living Environment: Pt unable to provide due to impaired cognition, pmhx of dementia.   Previous level of function: per chart pt had hip sx R IM soco 8/3/2024. WBAT. Pt was assisted all ADLS. ? Mobility.  Roles and Routines: unknown  Equipment Used at Home:  (pt unable to provide due to impaired cognition)  Assistance upon Discharge: unknown.    Pain/Comfort:  Pain Rating 1: 0/10  Pain Rating Post-Intervention 1: 0/10    Patients cultural, spiritual, Mormon conflicts given the current situation: no    Objective:     Communicated with: nurse prior to session.  Patient found HOB elevated with bed alarm, PureWick, telemetry upon OT entry to room.    General Precautions: Standard, fall, seizure, vision impaired (pmhx of dementia)  Orthopedic Precautions: N/A  Braces: N/A  Respiratory Status: Room air    Occupational Performance:    Bed Mobility:    Patient completed Scooting/Bridging with maximal assistance  Patient completed Supine to Sit with maximal assistance and pt assumed long sit in bed, transitioned to EOB Max Assist 2/2 poor follow through with instructions  Patient completed Sit to Supine with maximal assistance    Functional Mobility/Transfers:  Patient completed Sit <> Stand Transfer with pt not assisting; no initiation to assist despite RW and physical facilitation  with  rolling walker   Functional Mobility: NA    Activities of Daily Living:  Feeding:  total assistance .  Grooming: total assistance .  Lower Body Dressing: dependence .  Toileting: dependence .bed level with brief    Cognitive/Visual Perceptual:  Cognitive/Psychosocial Skills:     -       Oriented to: Person   -       Follows Commands/attention:Inattentive, Follows one-step commands, and ~25%   -       Communication: clear/fluent  -       Memory: Impaired STM, Impaired LTM, Poor immediate recall, and hx  dementia  -       Safety awareness/insight to disability: impaired   -       Mood/Affect/Coping skills/emotional control: Cooperative  Visual/Perceptual:      -Impaired  tracking     Physical Exam:  Balance:    -       sitting: min A seated EOB  Postural examination/scapula alignment:    -       Rounded shoulders  -       Forward head  Motor Planning:    -       impaired  Dominant hand:    -       right?  Upper Extremity Range of Motion:     -       Right Upper Extremity: WFL except shld  -       Left Upper Extremity: WFL except shld  Upper Extremity Strength:    -       Right Upper Extremity: WFL except shld 3+/5  -       Left Upper Extremity: WFL except shld 3+/5   Strength:    -       Right Upper Extremity: WFL  -       Left Upper Extremity: WFL    AMPAC 6 Click ADL:  AMPAC Total Score: 6    Treatment & Education:  Purpose of OT and POC  Progressive mobilization  [place] to initiate and follow with functional commands for self care act-pt had poor response.     Patient left HOB elevated with all lines intact, call button in reach, and bed alarm on    GOALS:   Multidisciplinary Problems       Occupational Therapy Goals          Problem: Occupational Therapy    Goal Priority Disciplines Outcome Interventions   Occupational Therapy Goal     OT, PT/OT Progressing    Description: Goals to be met by: 11/30/2024     Patient will increase functional independence with ADLs by performing:    Feeding with Supervision.  Sitting at edge of bed x 20 minutes with Supervision.  Supine to sit with Minimal Assistance.  Upgrade goals as appropriate.                         History:     Past Medical History:   Diagnosis Date    AAA (abdominal aortic aneurysm)     Arthritis     Rheumatoid arthritis(714.0)     Stroke          Past Surgical History:   Procedure Laterality Date    CORONARY ARTERY BYPASS GRAFT      INTRAMEDULLARY RODDING OF TROCHANTER OF FEMUR Right 8/3/2024    Procedure: INSERTION, INTRAMEDULLARY IVONE, FEMUR, TROCHANTER;   Surgeon: Rod England MD;  Location: [facility];  Service: Orthopedics;  Laterality: Right;    TOTAL KNEE ARTHROPLASTY         Time Tracking:     OT Date of Treatment: 11/18/24  OT Start Time: 1154  OT Stop Time: 1207  OT Total Time (min): 13 min    Billable Minutes:Evaluation 13  Total Time 13    11/18/2024

## 2024-11-19 NOTE — PLAN OF CARE
SW spoke with Pt son who is agreeable for Pt to go to [facility]. Per Kai she has spoken with family and has submitted for auth. Pending auth to be received SW will continue to follow.       11/19/24 1113   Post-Acute Status   Post-Acute Authorization Placement   Post-Acute Placement Status Pending payor review/awaiting authorization (if required)   Coverage Payor: [employer] MANAGED MEDICARE - [employer] MEDICARE HMO - CAPITATED   Discharge Plan   Discharge Plan A Skilled Nursing Facility   Discharge Plan B Skilled Nursing Facility

## 2024-11-19 NOTE — ASSESSMENT & PLAN NOTE
LORETTA is likely due to pre-renal azotemia due to intravascular volume depletion. Baseline creatinine is  1.01 .        Plan  - LORETTA is resolved.  - Avoid nephrotoxins and renally dose meds for GFR listed above  - Monitor urine output, serial BMP, and adjust therapy as needed  - IVF stopped on 11/18

## 2024-11-19 NOTE — PHYSICIAN QUERY
Please clarify the nutritional diagnosis associated with the clinical findings.  Moderate protein calorie malnutrition

## 2024-11-19 NOTE — PLAN OF CARE
Problem: Occupational Therapy  Goal: Occupational Therapy Goal  Description: Goals to be met by: 11/30/2024     Patient will increase functional independence with ADLs by performing:    Feeding with Supervision.  Sitting at edge of bed x 20 minutes with Supervision.  Supine to sit with Minimal Assistance.  Upgrade goals as appropriate.      11/18/2024 1810 by Kelly Marcano, OT  Outcome: Progressing

## 2024-11-19 NOTE — SUBJECTIVE & OBJECTIVE
Interval History:  Pt seen and examined.  Very confused this morning, but calm.  I attempted to reach her son, no answer.  She is medically stable currently and awaiting placement.    Her son did request x-rays to re-evaluate her right hip and leg yesterday due to complaints of pain.  These were negative for any acute abnormalities.    Review of Systems   Unable to perform ROS: Dementia   Constitutional:  Negative for chills and fever.   Respiratory:  Negative for cough and shortness of breath.    Psychiatric/Behavioral:  Positive for confusion.      Objective:     Vital Signs (Most Recent):  Temp: 98.9 °F (37.2 °C) (11/19/24 0711)  Pulse: 89 (11/19/24 0711)  Resp: 18 (11/19/24 0323)  BP: (!) 158/92 (11/19/24 0711)  SpO2: 100 % (11/19/24 0711) Vital Signs (24h Range):  Temp:  [97.4 °F (36.3 °C)-98.9 °F (37.2 °C)] 98.9 °F (37.2 °C)  Pulse:  [76-90] 89  Resp:  [17-18] 18  SpO2:  [96 %-100 %] 100 %  BP: (106-184)/(62-92) 158/92     Weight: 56 kg (123 lb 7.3 oz)  Body mass index is 19.93 kg/m².    Intake/Output Summary (Last 24 hours) at 11/19/2024 1056  Last data filed at 11/19/2024 0944  Gross per 24 hour   Intake 440 ml   Output --   Net 440 ml         Physical Exam  Vitals and nursing note reviewed.   Constitutional:       Appearance: Normal appearance.   HENT:      Head: Normocephalic and atraumatic.   Cardiovascular:      Rate and Rhythm: Normal rate and regular rhythm.   Pulmonary:      Effort: Pulmonary effort is normal.      Breath sounds: Normal breath sounds.   Abdominal:      General: Abdomen is flat. Bowel sounds are normal.      Palpations: Abdomen is soft.   Musculoskeletal:      Comments: Decreased use of lower extremities in bed, though able to move both equally   Skin:     General: Skin is warm and dry.      Capillary Refill: Capillary refill takes less than 2 seconds.   Neurological:      General: No focal deficit present.      Mental Status: She is alert.      Comments: Oriented to person only today.              Significant Labs: All pertinent labs within the past 24 hours have been reviewed.  CBC:   Recent Labs   Lab 11/18/24  0626 11/19/24  0600   WBC 6.26 8.09   HGB 11.7* 11.4*   HCT 37.8 36.8*    166     CMP:   Recent Labs   Lab 11/18/24  0000 11/18/24  0626 11/19/24  0600    140  141 139   K  --  4.1 3.6   CL  --  104 104   CO2  --  29 26   GLU  --  95 92   BUN  --  14 15   CREATININE  --  1.2 1.2   CALCIUM  --  10.3 9.6   PROT  --  6.8 6.6   ALBUMIN  --  3.4* 3.4*   BILITOT  --  0.7 0.6   ALKPHOS  --  63 66   AST  --  19 21   ALT  --  12 12   ANIONGAP  --  7* 9       Significant Imaging: I have reviewed all pertinent imaging results/findings within the past 24 hours.

## 2024-11-19 NOTE — PROGRESS NOTES
[facility] Medicine  Progress Note    Patient Name: Alondra Pacheco  MRN: [id number]  Patient Class: IP- Inpatient   Admission Date: 11/16/2024  Length of Stay: 3 days  Attending Physician: Ani Calabrese DO  Primary Care Provider: Jak Park MD        Subjective:     Principal Problem:Acute on chronic renal failure        HPI:  79-year-old female with pMHx of dementia, HTN, HLD, RA, and AAA who presented to the ED via EMS with family reports of failure to thrive.  According to EMS reports since patient's discharge 10/29/24 she has gotten progressively weaker and has not been eating.  Family also reports that the patient has been out of her medication, Aricept x5 days.  Son reported to EMS that he is unsure what he can care for his mother at this point.  Blood work performed in the ED with a sodium of 147, potassium 3, CO2 33, BUN 33, creatinine 1.5, calcium 12.1, GFR 34.8.  Urine is negative for infection.  Patient will be admitted for further evaluation and treatment.    Overview/Hospital Course:  Alondra Pacheco is an 79 year old female with PMH dementia, HLD, CKD stage 3, HTN, seizure disorder, RA, AAA, and stroke. She was admitted for acute on chronic renal failure and FTT. Patient started on IVF for hypernatremia and dehydration with improvement in labs-these were able to be discontinued.  PT/OT ordered. Registered dietician consulted.  Case Management facilitated placement.    Interval History:  Pt seen and examined.  Very confused this morning, but calm.  I attempted to reach her son, no answer.  She is medically stable currently and awaiting placement.    Her son did request x-rays to re-evaluate her right hip and leg yesterday due to complaints of pain.  These were negative for any acute abnormalities.    Review of Systems   Unable to perform ROS: Dementia   Constitutional:  Negative for chills and fever.   Respiratory:  Negative for cough and shortness of breath.     Psychiatric/Behavioral:  Positive for confusion.      Objective:     Vital Signs (Most Recent):  Temp: 98.9 °F (37.2 °C) (11/19/24 0711)  Pulse: 89 (11/19/24 0711)  Resp: 18 (11/19/24 0323)  BP: (!) 158/92 (11/19/24 0711)  SpO2: 100 % (11/19/24 0711) Vital Signs (24h Range):  Temp:  [97.4 °F (36.3 °C)-98.9 °F (37.2 °C)] 98.9 °F (37.2 °C)  Pulse:  [76-90] 89  Resp:  [17-18] 18  SpO2:  [96 %-100 %] 100 %  BP: (106-184)/(62-92) 158/92     Weight: 56 kg (123 lb 7.3 oz)  Body mass index is 19.93 kg/m².    Intake/Output Summary (Last 24 hours) at 11/19/2024 1056  Last data filed at 11/19/2024 0944  Gross per 24 hour   Intake 440 ml   Output --   Net 440 ml         Physical Exam  Vitals and nursing note reviewed.   Constitutional:       Appearance: Normal appearance.   HENT:      Head: Normocephalic and atraumatic.   Cardiovascular:      Rate and Rhythm: Normal rate and regular rhythm.   Pulmonary:      Effort: Pulmonary effort is normal.      Breath sounds: Normal breath sounds.   Abdominal:      General: Abdomen is flat. Bowel sounds are normal.      Palpations: Abdomen is soft.   Musculoskeletal:      Comments: Decreased use of lower extremities in bed, though able to move both equally   Skin:     General: Skin is warm and dry.      Capillary Refill: Capillary refill takes less than 2 seconds.   Neurological:      General: No focal deficit present.      Mental Status: She is alert.      Comments: Oriented to person only today.             Significant Labs: All pertinent labs within the past 24 hours have been reviewed.  CBC:   Recent Labs   Lab 11/18/24  0626 11/19/24 0600   WBC 6.26 8.09   HGB 11.7* 11.4*   HCT 37.8 36.8*    166     CMP:   Recent Labs   Lab 11/18/24  0000 11/18/24 0626 11/19/24 0600    140  141 139   K  --  4.1 3.6   CL  --  104 104   CO2  --  29 26   GLU  --  95 92   BUN  --  14 15   CREATININE  --  1.2 1.2   CALCIUM  --  10.3 9.6   PROT  --  6.8 6.6   ALBUMIN  --  3.4* 3.4*    BILITOT  --  0.7 0.6   ALKPHOS  --  63 66   AST  --  19 21   ALT  --  12 12   ANIONGAP  --  7* 9       Significant Imaging: I have reviewed all pertinent imaging results/findings within the past 24 hours.    Assessment/Plan:      * Acute on chronic renal failure  LORETTA is likely due to pre-renal azotemia due to intravascular volume depletion. Baseline creatinine is  1.01 .        Plan  - LORETTA is resolved.  - Avoid nephrotoxins and renally dose meds for GFR listed above  - Monitor urine output, serial BMP, and adjust therapy as needed  - IVF stopped on 11/18    ACP (advance care planning)  In light of the patients advanced and life limiting illness, I engaged the family in a conversation about the patient's preferences for care  at the very end of life. Patient's son expresses that patient is to have all resuscitation efforts performed in the event that her heart stops or she stops breathing.    However, if patient's health continues to decline and she develops organ failure and is unlikely to make any meaningful recovery, he would like to revisit code status.    I informed him that patient would be made a full code.  I spent a total of 10 minutes engaging the patient's son in this advance care planning discussion.     (Above from 11/17- Charu Oro NP)    Failure to thrive in adult  Consult case management/social work  Consult PT OT  Consult dietary  Encourage PO intake      Hypernatremia  Hypernatremia is likely due to Dehydration. The patient's most recent sodium results are listed below.  Recent Labs     11/17/24  1753 11/18/24  0000 11/18/24  0626    144 140  141       Plan  - Aim to correct the sodium by 8-10mEq in 24 hours.   - sodium has normalized.  We will stop IVFs, continue oral intake and monitor sodium daily.        Seizure disorder  Continue patient seizure medications      Hypertension, essential  Patients blood pressure range in the last 24 hours was: BP  Min: 114/69  Max: 180/87.The  "patient's inpatient anti-hypertensive regimen is listed below:  Current Antihypertensives  metoprolol succinate (TOPROL-XL) 24 hr tablet 25 mg, Daily, Oral  hydrALAZINE injection 10 mg, Every 4 hours PRN, Intravenous    Plan  - BP is controlled, no changes needed to their regimen  - monitor    Hyperlipidemia   Patient is chronically on statin.will continue for now. Monitor clinically. Last LDL was   Lab Results   Component Value Date    LDLCALC 155.0 02/01/2024          Dementia  Resume patient's Aricept.   Patient with dementia with likely etiology of unknown dementia. Dementia is mild. The patient does not have signs of behavioral disturbance. Home dementia medications are Held or Continued: continued.. Continue non-pharmacologic interventions to prevent delirium (No VS between 11PM-5AM, activity during day, opening blinds, providing glasses/hearing aids, and up in chair during daytime). Will avoid narcotics and benzos unless absolutely necessary. PRN anti-psychotics are not prescribed to avoid self harm behaviors.      VTE Risk Mitigation (From admission, onward)           Ordered     enoxaparin injection 40 mg  Daily        Note to Pharmacy: Ht: 5' 6" (1.676 m)  Wt: 63 kg (138 lb 14.2 oz)  Estimated Creatinine Clearance: 27.5 mL/min (A) (based on SCr of 1.5 mg/dL (H)).   Body mass index is 22.42 kg/m².    11/18/24 1619     IP VTE HIGH RISK PATIENT  Once         11/16/24 1717     Place sequential compression device  Until discontinued         11/16/24 1717                    Discharge Planning   BARRETT: 11/21/2024     Code Status: Full Code   Is the patient medically ready for discharge?:     Reason for patient still in hospital (select all that apply): Pending disposition  Discharge Plan A: Skilled Nursing Facility   Discharge Delays: (!) Patient and Family Barriers              Flaquita Anthony NP  Department of Hospital Medicine   [facility]    "

## 2024-11-19 NOTE — PLAN OF CARE
Problem: Adult Inpatient Plan of Care  Goal: Plan of Care Review  Outcome: Progressing  Goal: Patient-Specific Goal (Individualized)  Outcome: Progressing  Goal: Absence of Hospital-Acquired Illness or Injury  Outcome: Progressing  Goal: Optimal Comfort and Wellbeing  Outcome: Progressing  Goal: Readiness for Transition of Care  Outcome: Progressing     Problem: Acute Kidney Injury/Impairment  Goal: Fluid and Electrolyte Balance  Outcome: Progressing  Goal: Improved Oral Intake  Outcome: Progressing  Goal: Effective Renal Function  Outcome: Progressing     Problem: Wound  Goal: Optimal Coping  Outcome: Progressing  Goal: Optimal Functional Ability  Outcome: Progressing  Goal: Absence of Infection Signs and Symptoms  Outcome: Progressing  Goal: Improved Oral Intake  Outcome: Progressing  Goal: Optimal Pain Control and Function  Outcome: Progressing  Goal: Skin Health and Integrity  Outcome: Progressing  Goal: Optimal Wound Healing  Outcome: Progressing     Problem: Skin Injury Risk Increased  Goal: Skin Health and Integrity  Outcome: Progressing     Problem: Malnutrition  Goal: Improved Nutritional Intake  Outcome: Progressing

## 2024-11-20 LAB
1,25(OH)2D3 SERPL-MCNC: 9.8 PG/ML (ref 24.8–81.5)
ALBUMIN SERPL BCP-MCNC: 3.6 G/DL (ref 3.5–5.2)
ALP SERPL-CCNC: 64 U/L (ref 55–135)
ALT SERPL W/O P-5'-P-CCNC: 22 U/L (ref 10–44)
ANION GAP SERPL CALC-SCNC: 7 MMOL/L (ref 8–16)
AST SERPL-CCNC: 29 U/L (ref 10–40)
BASOPHILS # BLD AUTO: 0.06 K/UL (ref 0–0.2)
BASOPHILS NFR BLD: 0.9 % (ref 0–1.9)
BILIRUB SERPL-MCNC: 0.5 MG/DL (ref 0.1–1)
BUN SERPL-MCNC: 19 MG/DL (ref 8–23)
CALCIUM SERPL-MCNC: 9.7 MG/DL (ref 8.7–10.5)
CHLORIDE SERPL-SCNC: 107 MMOL/L (ref 95–110)
CO2 SERPL-SCNC: 29 MMOL/L (ref 23–29)
CREAT SERPL-MCNC: 1.3 MG/DL (ref 0.5–1.4)
DIFFERENTIAL METHOD BLD: ABNORMAL
EOSINOPHIL # BLD AUTO: 0.2 K/UL (ref 0–0.5)
EOSINOPHIL NFR BLD: 2.4 % (ref 0–8)
ERYTHROCYTE [DISTWIDTH] IN BLOOD BY AUTOMATED COUNT: 15.1 % (ref 11.5–14.5)
EST. GFR  (NO RACE VARIABLE): 41.3 ML/MIN/1.73 M^2
GLUCOSE SERPL-MCNC: 98 MG/DL (ref 70–110)
HCT VFR BLD AUTO: 37.5 % (ref 37–48.5)
HGB BLD-MCNC: 11.5 G/DL (ref 12–16)
IMM GRANULOCYTES # BLD AUTO: 0.03 K/UL (ref 0–0.04)
IMM GRANULOCYTES NFR BLD AUTO: 0.4 % (ref 0–0.5)
LYMPHOCYTES # BLD AUTO: 1.6 K/UL (ref 1–4.8)
LYMPHOCYTES NFR BLD: 24.1 % (ref 18–48)
MAGNESIUM SERPL-MCNC: 2 MG/DL (ref 1.6–2.6)
MCH RBC QN AUTO: 26.1 PG (ref 27–31)
MCHC RBC AUTO-ENTMCNC: 30.7 G/DL (ref 32–36)
MCV RBC AUTO: 85 FL (ref 82–98)
MONOCYTES # BLD AUTO: 0.6 K/UL (ref 0.3–1)
MONOCYTES NFR BLD: 8.7 % (ref 4–15)
NEUTROPHILS # BLD AUTO: 4.3 K/UL (ref 1.8–7.7)
NEUTROPHILS NFR BLD: 63.5 % (ref 38–73)
NRBC BLD-RTO: 0 /100 WBC
PLATELET # BLD AUTO: 176 K/UL (ref 150–450)
PMV BLD AUTO: 11.6 FL (ref 9.2–12.9)
POTASSIUM SERPL-SCNC: 4.1 MMOL/L (ref 3.5–5.1)
PROT SERPL-MCNC: 7.1 G/DL (ref 6–8.4)
RBC # BLD AUTO: 4.41 M/UL (ref 4–5.4)
SODIUM SERPL-SCNC: 143 MMOL/L (ref 136–145)
WBC # BLD AUTO: 6.69 K/UL (ref 3.9–12.7)

## 2024-11-20 PROCEDURE — 25000003 PHARM REV CODE 250: Performed by: STUDENT IN AN ORGANIZED HEALTH CARE EDUCATION/TRAINING PROGRAM

## 2024-11-20 PROCEDURE — 97530 THERAPEUTIC ACTIVITIES: CPT | Mod: CQ

## 2024-11-20 PROCEDURE — 85025 COMPLETE CBC W/AUTO DIFF WBC: CPT | Performed by: NURSE PRACTITIONER

## 2024-11-20 PROCEDURE — 86580 TB INTRADERMAL TEST: CPT

## 2024-11-20 PROCEDURE — 83735 ASSAY OF MAGNESIUM: CPT | Performed by: NURSE PRACTITIONER

## 2024-11-20 PROCEDURE — 25000003 PHARM REV CODE 250: Performed by: INTERNAL MEDICINE

## 2024-11-20 PROCEDURE — 25000003 PHARM REV CODE 250: Performed by: NURSE PRACTITIONER

## 2024-11-20 PROCEDURE — 97535 SELF CARE MNGMENT TRAINING: CPT

## 2024-11-20 PROCEDURE — 30200315 PPD INTRADERMAL TEST REV CODE 302

## 2024-11-20 PROCEDURE — 12000002 HC ACUTE/MED SURGE SEMI-PRIVATE ROOM

## 2024-11-20 PROCEDURE — 80053 COMPREHEN METABOLIC PANEL: CPT | Performed by: NURSE PRACTITIONER

## 2024-11-20 PROCEDURE — 36415 COLL VENOUS BLD VENIPUNCTURE: CPT | Performed by: NURSE PRACTITIONER

## 2024-11-20 PROCEDURE — 63600175 PHARM REV CODE 636 W HCPCS: Performed by: FAMILY MEDICINE

## 2024-11-20 PROCEDURE — 97530 THERAPEUTIC ACTIVITIES: CPT

## 2024-11-20 RX ORDER — ZOLPIDEM TARTRATE 5 MG/1
5 TABLET ORAL ONCE
Status: COMPLETED | OUTPATIENT
Start: 2024-11-20 | End: 2024-11-20

## 2024-11-20 RX ADMIN — TUBERCULIN PURIFIED PROTEIN DERIVATIVE 5 UNITS: 5 INJECTION, SOLUTION INTRADERMAL at 12:11

## 2024-11-20 RX ADMIN — LEVETIRACETAM 250 MG: 250 TABLET, FILM COATED ORAL at 08:11

## 2024-11-20 RX ADMIN — CYPROHEPTADINE HYDROCHLORIDE 4 MG: 4 TABLET ORAL at 08:11

## 2024-11-20 RX ADMIN — ENOXAPARIN SODIUM 40 MG: 40 INJECTION SUBCUTANEOUS at 05:11

## 2024-11-20 RX ADMIN — METOPROLOL SUCCINATE 25 MG: 25 TABLET, FILM COATED, EXTENDED RELEASE ORAL at 08:11

## 2024-11-20 RX ADMIN — ZOLPIDEM TARTRATE 5 MG: 5 TABLET, COATED ORAL at 01:11

## 2024-11-20 RX ADMIN — THERA TABS 1 TABLET: TAB at 08:11

## 2024-11-20 RX ADMIN — LACTULOSE 20 G: 20 SOLUTION ORAL at 08:11

## 2024-11-20 RX ADMIN — CLOPIDOGREL BISULFATE 75 MG: 75 TABLET, FILM COATED ORAL at 08:11

## 2024-11-20 RX ADMIN — LEVOTHYROXINE SODIUM 75 MCG: 0.03 TABLET ORAL at 06:11

## 2024-11-20 RX ADMIN — LAMOTRIGINE 100 MG: 100 TABLET ORAL at 08:11

## 2024-11-20 RX ADMIN — ATORVASTATIN CALCIUM 80 MG: 40 TABLET, FILM COATED ORAL at 08:11

## 2024-11-20 RX ADMIN — DONEPEZIL HYDROCHLORIDE 10 MG: 5 TABLET, FILM COATED ORAL at 08:11

## 2024-11-20 NOTE — SUBJECTIVE & OBJECTIVE
Interval History:  Patient seen and examined.  NAD.  Calm.  I attempted to reach her son, no answer, will try again this afternoon.  She is medically stable currently and awaiting placement.    Review of Systems   Unable to perform ROS: Dementia   Constitutional:  Negative for chills and fever.   Respiratory:  Negative for cough and shortness of breath.    Psychiatric/Behavioral:  Positive for confusion.      Objective:     Vital Signs (Most Recent):  Temp: 97.6 °F (36.4 °C) (11/20/24 1128)  Pulse: 93 (11/20/24 1128)  Resp: 16 (11/20/24 0738)  BP: (!) 96/58 (11/20/24 1128)  SpO2: 97 % (11/20/24 1128) Vital Signs (24h Range):  Temp:  [97.5 °F (36.4 °C)-98.7 °F (37.1 °C)] 97.6 °F (36.4 °C)  Pulse:  [82-97] 93  Resp:  [16-18] 16  SpO2:  [93 %-99 %] 97 %  BP: ()/(58-98) 96/58     Weight: 56 kg (123 lb 7.3 oz)  Body mass index is 19.93 kg/m².    Intake/Output Summary (Last 24 hours) at 11/20/2024 1212  Last data filed at 11/20/2024 0935  Gross per 24 hour   Intake 120 ml   Output 500 ml   Net -380 ml         Physical Exam  Vitals and nursing note reviewed.   Constitutional:       Appearance: Normal appearance.   HENT:      Head: Normocephalic and atraumatic.   Cardiovascular:      Rate and Rhythm: Normal rate and regular rhythm.   Pulmonary:      Effort: Pulmonary effort is normal.      Breath sounds: Normal breath sounds.   Abdominal:      General: Abdomen is flat. Bowel sounds are normal.      Palpations: Abdomen is soft.   Musculoskeletal:      Comments: Decreased use of lower extremities in bed, though able to move both equally   Skin:     General: Skin is warm and dry.      Capillary Refill: Capillary refill takes less than 2 seconds.   Neurological:      General: No focal deficit present.      Mental Status: She is alert.             Significant Labs: All pertinent labs within the past 24 hours have been reviewed.  CBC:   Recent Labs   Lab 11/19/24  0600 11/20/24  0542   WBC 8.09 6.69   HGB 11.4* 11.5*   HCT  36.8* 37.5    176     CMP:   Recent Labs   Lab 11/19/24  0600 11/20/24  0542    143   K 3.6 4.1    107   CO2 26 29   GLU 92 98   BUN 15 19   CREATININE 1.2 1.3   CALCIUM 9.6 9.7   PROT 6.6 7.1   ALBUMIN 3.4* 3.6   BILITOT 0.6 0.5   ALKPHOS 66 64   AST 21 29   ALT 12 22   ANIONGAP 9 7*       Significant Imaging: I have reviewed all pertinent imaging results/findings within the past 24 hours.

## 2024-11-20 NOTE — PLAN OF CARE
Problem: Occupational Therapy  Goal: Occupational Therapy Goal  Description: Goals to be met by: 11/30/2024     Patient will increase functional independence with ADLs by performing:    Feeding with Supervision.  Sitting at edge of bed x 20 minutes with Supervision.  Supine to sit with Minimal Assistance.  Upgrade goals as appropriate.    Outcome: Progressing

## 2024-11-20 NOTE — PROGRESS NOTES
[facility]  Progress Note    Patient Name: Alondra Pacheco  MRN: [id number]  Patient Class: IP- Inpatient   Admission Date: 11/16/2024  Length of Stay: 4 days  Attending Physician: Derick Sanchez MD  Primary Care Provider: Jak Park MD        Subjective:     Principal Problem:Acute on chronic renal failure        HPI:  79-year-old female with pMHx of dementia, HTN, HLD, RA, and AAA who presented to the ED via EMS with family reports of failure to thrive.  According to EMS reports since patient's discharge 10/29/24 she has gotten progressively weaker and has not been eating.  Family also reports that the patient has been out of her medication, Aricept x5 days.  Son reported to EMS that he is unsure what he can care for his mother at this point.  Blood work performed in the ED with a sodium of 147, potassium 3, CO2 33, BUN 33, creatinine 1.5, calcium 12.1, GFR 34.8.  Urine is negative for infection.  Patient will be admitted for further evaluation and treatment.    Overview/Hospital Course:  Alondra Pacheco is an 79 year old female with PMH dementia, HLD, CKD stage 3, HTN, seizure disorder, RA, AAA, and stroke. She was admitted for acute on chronic renal failure and FTT. Patient started on IVF for hypernatremia and dehydration with improvement in labs-these were able to be discontinued.  PT/OT ordered. Registered dietician consulted.  Case Management facilitated placement.    Interval History:  Patient seen and examined.  NAD.  Calm.  I attempted to reach her son, no answer, will try again this afternoon.  She is medically stable currently and awaiting placement.    Review of Systems   Unable to perform ROS: Dementia   Constitutional:  Negative for chills and fever.   Respiratory:  Negative for cough and shortness of breath.    Psychiatric/Behavioral:  Positive for confusion.      Objective:     Vital Signs (Most Recent):  Temp: 97.6 °F (36.4 °C) (11/20/24  1128)  Pulse: 93 (11/20/24 1128)  Resp: 16 (11/20/24 0738)  BP: (!) 96/58 (11/20/24 1128)  SpO2: 97 % (11/20/24 1128) Vital Signs (24h Range):  Temp:  [97.5 °F (36.4 °C)-98.7 °F (37.1 °C)] 97.6 °F (36.4 °C)  Pulse:  [82-97] 93  Resp:  [16-18] 16  SpO2:  [93 %-99 %] 97 %  BP: ()/(58-98) 96/58     Weight: 56 kg (123 lb 7.3 oz)  Body mass index is 19.93 kg/m².    Intake/Output Summary (Last 24 hours) at 11/20/2024 1212  Last data filed at 11/20/2024 0935  Gross per 24 hour   Intake 120 ml   Output 500 ml   Net -380 ml         Physical Exam  Vitals and nursing note reviewed.   Constitutional:       Appearance: Normal appearance.   HENT:      Head: Normocephalic and atraumatic.   Cardiovascular:      Rate and Rhythm: Normal rate and regular rhythm.   Pulmonary:      Effort: Pulmonary effort is normal.      Breath sounds: Normal breath sounds.   Abdominal:      General: Abdomen is flat. Bowel sounds are normal.      Palpations: Abdomen is soft.   Musculoskeletal:      Comments: Decreased use of lower extremities in bed, though able to move both equally   Skin:     General: Skin is warm and dry.      Capillary Refill: Capillary refill takes less than 2 seconds.   Neurological:      General: No focal deficit present.      Mental Status: She is alert.             Significant Labs: All pertinent labs within the past 24 hours have been reviewed.  CBC:   Recent Labs   Lab 11/19/24  0600 11/20/24  0542   WBC 8.09 6.69   HGB 11.4* 11.5*   HCT 36.8* 37.5    176     CMP:   Recent Labs   Lab 11/19/24  0600 11/20/24  0542    143   K 3.6 4.1    107   CO2 26 29   GLU 92 98   BUN 15 19   CREATININE 1.2 1.3   CALCIUM 9.6 9.7   PROT 6.6 7.1   ALBUMIN 3.4* 3.6   BILITOT 0.6 0.5   ALKPHOS 66 64   AST 21 29   ALT 12 22   ANIONGAP 9 7*       Significant Imaging: I have reviewed all pertinent imaging results/findings within the past 24 hours.    Assessment/Plan:      * Acute on chronic renal failure  LORETTA is likely due  to pre-renal azotemia due to intravascular volume depletion. Baseline creatinine is  1.01 .        Plan  - LORETTA is resolved.  - Avoid nephrotoxins and renally dose meds for GFR listed above  - Monitor urine output, serial BMP, and adjust therapy as needed  - IVF stopped on 11/18    Moderate malnutrition  Nutrition consulted. Most recent weight and BMI monitored-     Measurements:  Wt Readings from Last 1 Encounters:   11/17/24 56 kg (123 lb 7.3 oz)   Body mass index is 19.93 kg/m².    Patient has been screened and assessed by RD.    Malnutrition Type:  Context: chronic illness  Level: moderate    Malnutrition Characteristic Summary:  Weight Loss (Malnutrition): greater than 7.5% in 3 months  Energy Intake (Malnutrition): less than 75% for greater than or equal to 3 months  Muscle Mass (Malnutrition): mild depletion    Interventions/Recommendations (treatment strategy):  1. Continue current diet as toelrated. Consider liberlizing should intake be < 50% most meals. 2. Continue Ensure + HP BID. 3.  to obtain dialy menu choices to encourage intake.      ACP (advance care planning)  In light of the patients advanced and life limiting illness, I engaged the family in a conversation about the patient's preferences for care  at the very end of life. Patient's son expresses that patient is to have all resuscitation efforts performed in the event that her heart stops or she stops breathing.    However, if patient's health continues to decline and she develops organ failure and is unlikely to make any meaningful recovery, he would like to revisit code status.    I informed him that patient would be made a full code.  I spent a total of 10 minutes engaging the patient's son in this advance care planning discussion.     (Above from 11/17- Charu Oro NP)    Failure to thrive in adult  Consult case management/social work  Consult PT OT  Consult dietary  Encourage PO intake      Hypernatremia  Hypernatremia is likely due  "to Dehydration. The patient's most recent sodium results are listed below.  Recent Labs     11/18/24  0626 11/19/24  0600 11/20/24  0542     141 139 143       Plan  - Aim to correct the sodium by 8-10mEq in 24 hours.   - sodium has normalized.  We will stop IVFs, continue oral intake and monitor sodium daily.        Seizure disorder  Continue patient seizure medications      Hypertension, essential  Patients blood pressure range in the last 24 hours was: BP  Min: 96/58  Max: 150/98.The patient's inpatient anti-hypertensive regimen is listed below:  Current Antihypertensives  metoprolol succinate (TOPROL-XL) 24 hr tablet 25 mg, Daily, Oral  hydrALAZINE injection 10 mg, Every 4 hours PRN, Intravenous    Plan  - BP is controlled, no changes needed to their regimen  - monitor    Hyperlipidemia   Patient is chronically on statin.will continue for now. Monitor clinically. Last LDL was   Lab Results   Component Value Date    LDLCALC 155.0 02/01/2024          Dementia  Resume patient's Aricept.   Patient with dementia with likely etiology of unknown dementia. Dementia is mild. The patient does not have signs of behavioral disturbance. Home dementia medications are Held or Continued: continued.. Continue non-pharmacologic interventions to prevent delirium (No VS between 11PM-5AM, activity during day, opening blinds, providing glasses/hearing aids, and up in chair during daytime). Will avoid narcotics and benzos unless absolutely necessary. PRN anti-psychotics are not prescribed to avoid self harm behaviors.      VTE Risk Mitigation (From admission, onward)           Ordered     enoxaparin injection 40 mg  Daily        Note to Pharmacy: Ht: 5' 6" (1.676 m)  Wt: 63 kg (138 lb 14.2 oz)  Estimated Creatinine Clearance: 27.5 mL/min (A) (based on SCr of 1.5 mg/dL (H)).   Body mass index is 22.42 kg/m².    11/18/24 1619     IP VTE HIGH RISK PATIENT  Once         11/16/24 1717     Place sequential compression device  Until " discontinued         11/16/24 1717                    Discharge Planning   BARRETT: 11/21/2024     Code Status: Full Code   Is the patient medically ready for discharge?:     Reason for patient still in hospital (select all that apply): pending disposition  Discharge Plan A: Skilled Nursing Facility   Discharge Delays: (!) Patient and Family Barriers              KAIDEN Lagos  Department of Hospital Medicine   [facility]ly [facility]

## 2024-11-20 NOTE — ASSESSMENT & PLAN NOTE
Patients blood pressure range in the last 24 hours was: BP  Min: 96/58  Max: 150/98.The patient's inpatient anti-hypertensive regimen is listed below:  Current Antihypertensives  metoprolol succinate (TOPROL-XL) 24 hr tablet 25 mg, Daily, Oral  hydrALAZINE injection 10 mg, Every 4 hours PRN, Intravenous    Plan  - BP is controlled, no changes needed to their regimen  - monitor

## 2024-11-20 NOTE — PT/OT/SLP PROGRESS
Occupational Therapy   Treatment    Name: Alondra Pacheco  MRN: [id number]  Admitting Diagnosis:  Acute on chronic renal failure       Recommendations:     Discharge Recommendations: No Therapy Indicated-   penitentiary Care  Discharge Equipment Recommendations:   (continue to assess)  Barriers to discharge:   (impaired cognition, increased assistance with ADLs.)    Assessment:     Alondra Pacheco is a 79 y.o. female with a medical diagnosis of Acute on chronic renal failure. Performance deficits affecting function are weakness, impaired endurance, impaired self care skills, impaired functional mobility, gait instability, impaired balance, impaired cognition, decreased upper extremity function, decreased lower extremity function, decreased safety awareness, impaired cardiopulmonary response to activity.     Rehab Prognosis:  Good; patient would benefit from acute skilled OT services to address these deficits and reach maximum level of function.       Plan:     Patient to be seen 3 x/week to address the above listed problems via self-care/home management, therapeutic activities, therapeutic exercises  Plan of Care Expires: 12/18/24  Plan of Care Reviewed with: patient    Subjective     Chief Complaint: none stated  Patient/Family Comments/goals: She was willing to participate with OT.  Pain/Comfort:  Pain Rating 1: 0/10    Objective:     Communicated with: nurse prior to session.  Patient found supine with bed alarm, telemetry, peripheral IV, PureWick upon OT entry to room.    General Precautions: Standard, fall, seizure    Orthopedic Precautions:N/A  Braces: N/A  Respiratory Status: Room air     Occupational Performance:     Bed Mobility:    Patient completed Rolling/Turning to Right with minimum assistance  Patient completed Supine to Sit with moderate assistance  Patient completed Sit to Supine with moderate assistance   She sat EOB for 8 minutes with Min A to maintain erect sitting posture.     Activities of  Daily Living:  Feeding:  maximal assistance with [place] at the elbow and the hand  for self feeding.  Unable to stab or scoop food without [place] A. Support provided at the elbow to drink from the Ensure container and to drink from cup with straw.    Grooming: maximal assistance for oral care, combing hair and washing face at bed level and [place] A at the elbow and hand.      Treatment & Education:  She was educated on Role of Occupational Therapy, goals and plan of care.  Discussed importance of OOB activity and functional mobility to negate the negative effects of prolonged bedrest during this hospitalization, safe transfers and d/c planning recommendations. Therapist provided facilitation and instruction of proper body mechanics and fall prevention strategies during tasks listed above.      Patient left HOB elevated with all lines intact, call button in reach, and bed alarm on    GOALS:   Multidisciplinary Problems       Occupational Therapy Goals          Problem: Occupational Therapy    Goal Priority Disciplines Outcome Interventions   Occupational Therapy Goal     OT, PT/OT Progressing    Description: Goals to be met by: 11/30/2024     Patient will increase functional independence with ADLs by performing:    Feeding with Supervision.  Sitting at edge of bed x 20 minutes with Supervision.  Supine to sit with Minimal Assistance.  Upgrade goals as appropriate.                         Time Tracking:     OT Date of Treatment: 11/20/24  OT Start Time: 1140  OT Stop Time: 1212  OT Total Time (min): 32 min    Billable Minutes:Self Care/Home Management 17  Therapeutic Activity 15    OT/ZULEMA: OT          11/20/2024

## 2024-11-20 NOTE — PT/OT/SLP PROGRESS
Physical Therapy Treatment    Patient Name:  Alondra Pacheco   MRN:  [id number]    Recommendations:     Discharge Recommendations: Moderate Intensity Therapy  Discharge Equipment Recommendations: to be determined by next level of care  Barriers to discharge:  increased assist with mobility, decreased safety awareness, balance deficits, decreased activity tolerance    Assessment:     Alondra Pacheco is a 79 y.o. female admitted with a medical diagnosis of Acute on chronic renal failure.  She presents with the following impairments/functional limitations: weakness, impaired endurance, impaired self care skills, impaired functional mobility, impaired balance, decreased lower extremity function, impaired cognition, decreased safety awareness, orthopedic precautions.    Pt agreeable to visit. Pt required max assist for supine to sit transfer with pt able initiate advancing BLE towards EOB.    Pt required min to mod assist to maintain balance sitting EOB due to posterior lean, right lateral lean, and rounded posture.    Pt returned to bed with max assist and required max assist for positioning in bed.    Rehab Prognosis: Fair; patient would benefit from acute skilled PT services to address these deficits and reach maximum level of function.    Recent Surgery: * No surgery found *      Plan:     During this hospitalization, patient to be seen 3 x/week to address the identified rehab impairments via gait training, therapeutic activities, therapeutic exercises and progress toward the following goals:    Plan of Care Expires:  12/17/24    Subjective     Chief Complaint: none verbalized  Patient/Family Comments/goals: to get better  Pain/Comfort:  Pain Rating 1: 0/10      Objective:     Communicated with nurse prior to session.  Patient found HOB elevated with bed alarm, PureWick, telemetry upon PT entry to room.     General Precautions: Standard, fall (dementia)  Orthopedic Precautions: RLE weight bearing as tolerated (R  IM soco of femur 8/3/24 at [facility] per chart review)  Braces: N/A  Respiratory Status: Room air     Functional Mobility:  Bed Mobility:     Scooting: maximal assistance  Supine to Sit: maximal assistance  Sit to Supine: maximal assistance  Balance: sitting EOB with min-modA for balance due to right lean, posterior lean and rounded posture      AM-PAC 6 CLICK MOBILITY          Treatment & Education:  Pt educated on importance of time OOB, importance of intermittent mobility, safe techniques for transfers/ambulation, discharge recommendations/options, and use of call light for assistance and fall prevention.      Patient left HOB elevated with all lines intact, call button in reach, and bed alarm on..    GOALS:   Multidisciplinary Problems       Physical Therapy Goals          Problem: Physical Therapy    Goal Priority Disciplines Outcome Interventions   Physical Therapy Goal     PT, PT/OT Progressing    Description: Goals to be met by: 24     Patient will increase functional independence with mobility by performin. Supine to sit with Moderate Assistance  2. Sit to stand transfer with Moderate Assistance  3. Bed to chair transfer with Moderate Assistance using Rolling Walker  4. Gait  x 5 feet with Moderate Assistance using Rolling Walker.                          Time Tracking:     PT Received On: 24  PT Start Time: 1054     PT Stop Time: 1103  PT Total Time (min): 9 min     Billable Minutes: Therapeutic Activity 9    Treatment Type: Treatment  PT/PTA: PTA     Number of PTA visits since last PT visit: 2024

## 2024-11-20 NOTE — PLAN OF CARE
Melody out today LIVIA spoke with alcides at [phone]. Per alcides he has not received anything in regards to auth yet. LIVIA also called  Chantelle at [phone] Spoke with Hortencia Per Hortencia auth is still pending. LIVIA will continue to follow      11/20/24 1302   Post-Acute Status   Post-Acute Authorization Placement   Post-Acute Placement Status Pending payor review/awaiting authorization (if required)   Discharge Plan   Discharge Plan A Skilled Nursing Facility   Discharge Plan B Skilled Nursing Facility

## 2024-11-20 NOTE — PLAN OF CARE
Pt to DC to [facility] once [employer] auth obtained.          11/20/24 1756   Discharge Reassessment   Assessment Type Discharge Planning Reassessment   Did the patient's condition or plan change since previous assessment? Yes   Communicated BARRETT with patient/caregiver Yes   Discharge Plan A Skilled Nursing Facility   Discharge Plan B Skilled Nursing Facility   Post-Acute Status   Post-Acute Authorization Placement   Post-Acute Placement Status Pending payor review/awaiting authorization (if required)

## 2024-11-20 NOTE — PT/OT/SLP PROGRESS
Occupational Therapy   Treatment    Name: Alondra Pacheco  MRN: [id number]  Admitting Diagnosis:  Acute on chronic renal failure     The primary encounter diagnosis was Dehydration. Diagnoses of LORETTA (acute kidney injury), Hypercalcemia, Failure to thrive in adult, Hypokalemia, Acute kidney injury superimposed on chronic kidney disease, and Chest pain were also pertinent to this visit.    Recommendations:     Discharge Recommendations: No Therapy Indicated  Discharge Equipment Recommendations:  hospital bed, lift device  Barriers to discharge:   (increased caregiver budern of care)    Assessment:     Alondra Pacheco is a 79 y.o. female with a medical diagnosis of Acute on chronic renal failure.  She presents with debility. Pt progressed to feeding herself few bites of finger foods and sipped on ensure with SBA once placed in her hand. Pt is still max a for feeding meal. Pt transitioned to EOB Max A. Sat EOB with R trunk lean, facilitated trunk midline then pt sat EOB with Min A-CGA. Still fearful off falling however less anxious than on eval. Continue with OT POC. Performance deficits affecting function are weakness, impaired self care skills, impaired functional mobility, impaired endurance, gait instability, impaired balance, visual deficits, impaired cognition, decreased coordination, decreased upper extremity function, decreased lower extremity function, decreased safety awareness, decreased ROM, impaired coordination.     Rehab Prognosis:  Fair; patient would benefit from acute skilled OT services to address these deficits and reach maximum level of function.       Plan:     Patient to be seen 3 x/week to address the above listed problems via self-care/home management, therapeutic activities, therapeutic exercises  Plan of Care Expires: 12/18/24  Plan of Care Reviewed with: patient    Subjective     Chief Complaint: none  Patient/Family Comments/goals: pt willing to work with OT.  Pain/Comfort:  Pain Rating  1: 0/10  Pain Rating Post-Intervention 1: 0/10    Objective:     Communicated with: nurse prior to session.  Patient found HOB elevated with bed alarm, PureWick, telemetry upon OT entry to room.    General Precautions: Standard, fall, seizure, vision impaired (dementia)    Orthopedic Precautions:N/A  Braces: N/A  Respiratory Status: Room air     Occupational Performance:     Bed Mobility:    Patient completed Scooting/Bridging with maximal assistance  Patient completed Supine to Sit with total assistance  Patient completed Sit to Supine with maximal assistance     Functional Mobility: pt sat EOB 15 in Min A-CGA with BUE support on bed. Pt fearful. Pt was verbally and physically reassured her of her safety while seated in front of her.    Activities of Daily Living:  Feeding:  stand by assistance  to feed 3 cookies and sip n ensure once placed in her hand seated EOB. Once back in bed, HOB elevated, pt picked up ensure container with cues, slow, unsteady/mild tremors with 2 attempts to get straw into mouth.      [facility] 6 Click ADL: 8    Treatment & Education:  Purpose of OT and POC  Pt seen for safe self care and fx mobility retraining as state above.  Engaged pt in reminiscence conversations to distract and help calm her anxiety and fear of falling in addition to above techniques while seated EOB. She as very verbally engaged although confused, still answering appropriately at times. Re-oriented her to place.  She followed simple 1 step commands better this date for functional tasks.      Patient left HOB elevated with all lines intact, call button in reach, and bed alarm on    GOALS:   Multidisciplinary Problems       Occupational Therapy Goals          Problem: Occupational Therapy    Goal Priority Disciplines Outcome Interventions   Occupational Therapy Goal     OT, PT/OT Progressing    Description: Goals to be met by: 11/30/2024     Patient will increase functional independence with ADLs by performing:    Feeding  with Supervision.  Sitting at edge of bed x 20 minutes with Supervision.  Supine to sit with Minimal Assistance.  Upgrade goals as appropriate.                         Time Tracking:     OT Date of Treatment: 11/19/24  OT Start Time: 1457  OT Stop Time: 1521  OT Total Time (min): 24 min    Billable Minutes:Self Care/Home Management 9  Therapeutic Activity 15  Total Time 24    OT/ZULEMA: OT          11/19/2024

## 2024-11-20 NOTE — ASSESSMENT & PLAN NOTE
Nutrition consulted. Most recent weight and BMI monitored-     Measurements:  Wt Readings from Last 1 Encounters:   11/17/24 56 kg (123 lb 7.3 oz)   Body mass index is 19.93 kg/m².    Patient has been screened and assessed by RD.    Malnutrition Type:  Context: chronic illness  Level: moderate    Malnutrition Characteristic Summary:  Weight Loss (Malnutrition): greater than 7.5% in 3 months  Energy Intake (Malnutrition): less than 75% for greater than or equal to 3 months  Muscle Mass (Malnutrition): mild depletion    Interventions/Recommendations (treatment strategy):  1. Continue current diet as toelrated. Consider liberlizing should intake be < 50% most meals. 2. Continue Ensure + HP BID. 3.  to obtain dialy menu choices to encourage intake.

## 2024-11-20 NOTE — ASSESSMENT & PLAN NOTE
Hypernatremia is likely due to Dehydration. The patient's most recent sodium results are listed below.  Recent Labs     11/18/24  0626 11/19/24  0600 11/20/24  0542     141 139 143       Plan  - Aim to correct the sodium by 8-10mEq in 24 hours.   - sodium has normalized.  We will stop IVFs, continue oral intake and monitor sodium daily.

## 2024-11-21 PROBLEM — E87.0 HYPERNATREMIA: Status: RESOLVED | Noted: 2024-11-16 | Resolved: 2024-11-21

## 2024-11-21 LAB
ALBUMIN SERPL BCP-MCNC: 3.5 G/DL (ref 3.5–5.2)
ALP SERPL-CCNC: 62 U/L (ref 55–135)
ALT SERPL W/O P-5'-P-CCNC: 58 U/L (ref 10–44)
ANION GAP SERPL CALC-SCNC: 9 MMOL/L (ref 8–16)
AST SERPL-CCNC: 71 U/L (ref 10–40)
BASOPHILS # BLD AUTO: 0.06 K/UL (ref 0–0.2)
BASOPHILS NFR BLD: 0.7 % (ref 0–1.9)
BILIRUB SERPL-MCNC: 0.6 MG/DL (ref 0.1–1)
BUN SERPL-MCNC: 23 MG/DL (ref 8–23)
CALCIUM SERPL-MCNC: 9 MG/DL (ref 8.7–10.5)
CHLORIDE SERPL-SCNC: 109 MMOL/L (ref 95–110)
CO2 SERPL-SCNC: 22 MMOL/L (ref 23–29)
CREAT SERPL-MCNC: 1.1 MG/DL (ref 0.5–1.4)
DIFFERENTIAL METHOD BLD: ABNORMAL
EOSINOPHIL # BLD AUTO: 0.3 K/UL (ref 0–0.5)
EOSINOPHIL NFR BLD: 3.7 % (ref 0–8)
ERYTHROCYTE [DISTWIDTH] IN BLOOD BY AUTOMATED COUNT: 15.2 % (ref 11.5–14.5)
EST. GFR  (NO RACE VARIABLE): 50.5 ML/MIN/1.73 M^2
GLUCOSE SERPL-MCNC: 87 MG/DL (ref 70–110)
HCT VFR BLD AUTO: 37.1 % (ref 37–48.5)
HGB BLD-MCNC: 11.4 G/DL (ref 12–16)
IMM GRANULOCYTES # BLD AUTO: 0.05 K/UL (ref 0–0.04)
IMM GRANULOCYTES NFR BLD AUTO: 0.6 % (ref 0–0.5)
LYMPHOCYTES # BLD AUTO: 2.2 K/UL (ref 1–4.8)
LYMPHOCYTES NFR BLD: 27.3 % (ref 18–48)
MAGNESIUM SERPL-MCNC: 2 MG/DL (ref 1.6–2.6)
MCH RBC QN AUTO: 26 PG (ref 27–31)
MCHC RBC AUTO-ENTMCNC: 30.7 G/DL (ref 32–36)
MCV RBC AUTO: 85 FL (ref 82–98)
MONOCYTES # BLD AUTO: 0.8 K/UL (ref 0.3–1)
MONOCYTES NFR BLD: 9.3 % (ref 4–15)
NEUTROPHILS # BLD AUTO: 4.7 K/UL (ref 1.8–7.7)
NEUTROPHILS NFR BLD: 58.4 % (ref 38–73)
NRBC BLD-RTO: 0 /100 WBC
PLATELET # BLD AUTO: 169 K/UL (ref 150–450)
PMV BLD AUTO: 11.4 FL (ref 9.2–12.9)
POTASSIUM SERPL-SCNC: 3.8 MMOL/L (ref 3.5–5.1)
PROT SERPL-MCNC: 7.1 G/DL (ref 6–8.4)
RBC # BLD AUTO: 4.38 M/UL (ref 4–5.4)
SODIUM SERPL-SCNC: 140 MMOL/L (ref 136–145)
WBC # BLD AUTO: 8.06 K/UL (ref 3.9–12.7)

## 2024-11-21 PROCEDURE — 12000002 HC ACUTE/MED SURGE SEMI-PRIVATE ROOM

## 2024-11-21 PROCEDURE — 80053 COMPREHEN METABOLIC PANEL: CPT | Performed by: NURSE PRACTITIONER

## 2024-11-21 PROCEDURE — 25000003 PHARM REV CODE 250: Performed by: INTERNAL MEDICINE

## 2024-11-21 PROCEDURE — 83735 ASSAY OF MAGNESIUM: CPT | Performed by: NURSE PRACTITIONER

## 2024-11-21 PROCEDURE — 85025 COMPLETE CBC W/AUTO DIFF WBC: CPT | Performed by: NURSE PRACTITIONER

## 2024-11-21 PROCEDURE — 25000003 PHARM REV CODE 250: Performed by: NURSE PRACTITIONER

## 2024-11-21 PROCEDURE — 36415 COLL VENOUS BLD VENIPUNCTURE: CPT | Performed by: NURSE PRACTITIONER

## 2024-11-21 PROCEDURE — 63600175 PHARM REV CODE 636 W HCPCS: Performed by: FAMILY MEDICINE

## 2024-11-21 RX ORDER — LACTULOSE 10 G/15ML
20 SOLUTION ORAL 3 TIMES DAILY
Start: 2024-11-21

## 2024-11-21 RX ADMIN — LACTULOSE 20 G: 20 SOLUTION ORAL at 03:11

## 2024-11-21 RX ADMIN — DONEPEZIL HYDROCHLORIDE 10 MG: 5 TABLET, FILM COATED ORAL at 10:11

## 2024-11-21 RX ADMIN — LACTULOSE 20 G: 20 SOLUTION ORAL at 10:11

## 2024-11-21 RX ADMIN — LEVOTHYROXINE SODIUM 75 MCG: 0.03 TABLET ORAL at 05:11

## 2024-11-21 RX ADMIN — CYPROHEPTADINE HYDROCHLORIDE 4 MG: 4 TABLET ORAL at 08:11

## 2024-11-21 RX ADMIN — LEVETIRACETAM 250 MG: 250 TABLET, FILM COATED ORAL at 08:11

## 2024-11-21 RX ADMIN — LAMOTRIGINE 100 MG: 100 TABLET ORAL at 08:11

## 2024-11-21 RX ADMIN — CLOPIDOGREL BISULFATE 75 MG: 75 TABLET, FILM COATED ORAL at 10:11

## 2024-11-21 RX ADMIN — METOPROLOL SUCCINATE 25 MG: 25 TABLET, FILM COATED, EXTENDED RELEASE ORAL at 10:11

## 2024-11-21 RX ADMIN — LAMOTRIGINE 100 MG: 100 TABLET ORAL at 10:11

## 2024-11-21 RX ADMIN — THERA TABS 1 TABLET: TAB at 10:11

## 2024-11-21 RX ADMIN — ATORVASTATIN CALCIUM 80 MG: 40 TABLET, FILM COATED ORAL at 10:11

## 2024-11-21 RX ADMIN — LACTULOSE 20 G: 20 SOLUTION ORAL at 08:11

## 2024-11-21 RX ADMIN — ENOXAPARIN SODIUM 40 MG: 40 INJECTION SUBCUTANEOUS at 04:11

## 2024-11-21 RX ADMIN — LEVETIRACETAM 250 MG: 250 TABLET, FILM COATED ORAL at 10:11

## 2024-11-21 NOTE — PLAN OF CARE
Spoke with Gigi Pacheco (Son)- [phone], regarding discharge IMM, Understands statement  and IMM will be scanned to chart.

## 2024-11-21 NOTE — PLAN OF CARE
SNF was denied [facility] did not get any information for a p2p. LIVIA  called [facility] [phone] spoke with Xiao she said that they called [phone] on yesterday to let facility know SNF was denied. No one answered and a denial emailed was drafted but never sent to facility so no one knew. P2P deadline has passed as it was suppose to be scheduled by 9am this morning. LIVIA informed managers      11/21/24 4366   Post-Acute Status   Post-Acute Authorization Placement

## 2024-11-21 NOTE — PROGRESS NOTES
[facility] Medicine  Progress Note    Patient Name: Alondra Pacheco  MRN: [id number]  Patient Class: IP- Inpatient   Admission Date: 11/16/2024  Length of Stay: 5 days  Attending Physician: Derick Sanchez MD  Primary Care Provider: Jak Park MD        Subjective:     Principal Problem:Acute on chronic renal failure        HPI:  79-year-old female with pMHx of dementia, HTN, HLD, RA, and AAA who presented to the ED via EMS with family reports of failure to thrive.  According to EMS reports since patient's discharge 10/29/24 she has gotten progressively weaker and has not been eating.  Family also reports that the patient has been out of her medication, Aricept x5 days.  Son reported to EMS that he is unsure what he can care for his mother at this point.  Blood work performed in the ED with a sodium of 147, potassium 3, CO2 33, BUN 33, creatinine 1.5, calcium 12.1, GFR 34.8.  Urine is negative for infection.  Patient will be admitted for further evaluation and treatment.    Overview/Hospital Course:  Alondra Pacheco is an 79 year old female with PMH dementia, HLD, CKD stage 3, HTN, seizure disorder, RA, AAA, and stroke. She was admitted for acute on chronic renal failure and FTT. Patient started on IVF for hypernatremia and dehydration with improvement in labs-these were able to be discontinued.  PT/OT ordered. Registered dietician consulted.  Case Management facilitated placement.    Interval History:  Patient seen and examined.  NAD.  Calm.  She is medically stable currently and awaiting placement.    Review of Systems   Unable to perform ROS: Dementia   Constitutional:  Negative for chills and fever.   Respiratory:  Negative for cough and shortness of breath.    Psychiatric/Behavioral:  Positive for confusion.      Objective:     Vital Signs (Most Recent):  Temp: 97.5 °F (36.4 °C) (11/21/24 1130)  Pulse: 84 (11/21/24 1130)  Resp: 18 (11/21/24 1130)  BP: 118/69 (11/21/24  1130)  SpO2: 97 % (11/21/24 1130) Vital Signs (24h Range):  Temp:  [97.4 °F (36.3 °C)-98.4 °F (36.9 °C)] 97.5 °F (36.4 °C)  Pulse:  [67-93] 84  Resp:  [15-18] 18  SpO2:  [96 %-98 %] 97 %  BP: (109-142)/(69-82) 118/69     Weight: 56 kg (123 lb 7.3 oz)  Body mass index is 19.93 kg/m².    Intake/Output Summary (Last 24 hours) at 11/21/2024 1433  Last data filed at 11/21/2024 1337  Gross per 24 hour   Intake 720 ml   Output 500 ml   Net 220 ml         Physical Exam  Vitals and nursing note reviewed.   Constitutional:       Appearance: Normal appearance.   HENT:      Head: Normocephalic and atraumatic.   Cardiovascular:      Rate and Rhythm: Normal rate and regular rhythm.   Pulmonary:      Effort: Pulmonary effort is normal.      Breath sounds: Normal breath sounds.   Abdominal:      General: Abdomen is flat. Bowel sounds are normal.      Palpations: Abdomen is soft.   Musculoskeletal:      Comments: Decreased use of lower extremities in bed, though able to move both equally   Skin:     General: Skin is warm and dry.      Capillary Refill: Capillary refill takes less than 2 seconds.   Neurological:      General: No focal deficit present.      Mental Status: She is alert.             Significant Labs: All pertinent labs within the past 24 hours have been reviewed.  CBC:   Recent Labs   Lab 11/20/24  0542 11/21/24  0703   WBC 6.69 8.06   HGB 11.5* 11.4*   HCT 37.5 37.1    169     CMP:   Recent Labs   Lab 11/20/24  0542 11/21/24  0703    140   K 4.1 3.8    109   CO2 29 22*   GLU 98 87   BUN 19 23   CREATININE 1.3 1.1   CALCIUM 9.7 9.0   PROT 7.1 7.1   ALBUMIN 3.6 3.5   BILITOT 0.5 0.6   ALKPHOS 64 62   AST 29 71*   ALT 22 58*   ANIONGAP 7* 9       Significant Imaging: I have reviewed all pertinent imaging results/findings within the past 24 hours.    Assessment/Plan:      * Acute on chronic renal failure  LORETTA is likely due to pre-renal azotemia due to intravascular volume depletion. Baseline creatinine  is  1.01 .        Plan  - LORETTA is resolved.  - Avoid nephrotoxins and renally dose meds for GFR listed above  - Monitor urine output, serial BMP, and adjust therapy as needed  - IVF stopped on 11/18    Moderate malnutrition  Nutrition consulted. Most recent weight and BMI monitored-     Measurements:  Wt Readings from Last 1 Encounters:   11/17/24 56 kg (123 lb 7.3 oz)   Body mass index is 19.93 kg/m².    Patient has been screened and assessed by RD.    Malnutrition Type:  Context: chronic illness  Level: moderate    Malnutrition Characteristic Summary:  Weight Loss (Malnutrition): greater than 7.5% in 3 months  Energy Intake (Malnutrition): less than 75% for greater than or equal to 3 months  Muscle Mass (Malnutrition): mild depletion    Interventions/Recommendations (treatment strategy):  1. Continue current diet as toelrated. Consider liberlizing should intake be < 50% most meals. 2. Continue Ensure + HP BID. 3.  to obtain dialy menu choices to encourage intake.      ACP (advance care planning)  In light of the patients advanced and life limiting illness, I engaged the family in a conversation about the patient's preferences for care  at the very end of life. Patient's son expresses that patient is to have all resuscitation efforts performed in the event that her heart stops or she stops breathing.    However, if patient's health continues to decline and she develops organ failure and is unlikely to make any meaningful recovery, he would like to revisit code status.    I informed him that patient would be made a full code.  I spent a total of 10 minutes engaging the patient's son in this advance care planning discussion.     (Above from 11/17- Charu Oro NP)    Failure to thrive in adult  Consult case management/social work  Consult PT OT  Consult dietary  Encourage PO intake      Seizure disorder  Continue patient seizure medications      Hypertension, essential  Patients blood pressure range in the  "last 24 hours was: BP  Min: 96/58  Max: 150/98.The patient's inpatient anti-hypertensive regimen is listed below:  Current Antihypertensives  metoprolol succinate (TOPROL-XL) 24 hr tablet 25 mg, Daily, Oral  hydrALAZINE injection 10 mg, Every 4 hours PRN, Intravenous    Plan  - BP is controlled, no changes needed to their regimen  - monitor    Hyperlipidemia   Patient is chronically on statin.will continue for now. Monitor clinically. Last LDL was   Lab Results   Component Value Date    LDLCALC 155.0 02/01/2024          Dementia  Resume patient's Aricept.   Patient with dementia with likely etiology of unknown dementia. Dementia is mild. The patient does not have signs of behavioral disturbance. Home dementia medications are Held or Continued: continued.. Continue non-pharmacologic interventions to prevent delirium (No VS between 11PM-5AM, activity during day, opening blinds, providing glasses/hearing aids, and up in chair during daytime). Will avoid narcotics and benzos unless absolutely necessary. PRN anti-psychotics are not prescribed to avoid self harm behaviors.      VTE Risk Mitigation (From admission, onward)           Ordered     enoxaparin injection 40 mg  Daily        Note to Pharmacy: Ht: 5' 6" (1.676 m)  Wt: 63 kg (138 lb 14.2 oz)  Estimated Creatinine Clearance: 27.5 mL/min (A) (based on SCr of 1.5 mg/dL (H)).   Body mass index is 22.42 kg/m².    11/18/24 1619     IP VTE HIGH RISK PATIENT  Once         11/16/24 1717     Place sequential compression device  Until discontinued         11/16/24 1717                    Discharge Planning   BARRETT: 11/21/2024     Code Status: Full Code   Is the patient medically ready for discharge?:     Reason for patient still in hospital (select all that apply): pending placement  Discharge Plan A: Skilled Nursing Facility   Discharge Delays: (!) Patient and Family Barriers              KAIDEN Lagos  Department of Hospital Medicine   [facility] hospital    "

## 2024-11-21 NOTE — ASSESSMENT & PLAN NOTE
Hypernatremia is likely due to Dehydration. The patient's most recent sodium results are listed below.  Recent Labs     11/19/24  0600 11/20/24  0542 11/21/24  0703    143 140       Plan  - Aim to correct the sodium by 8-10mEq in 24 hours.   - sodium has normalized.  We will stop IVFs, continue oral intake and monitor sodium daily.

## 2024-11-21 NOTE — SUBJECTIVE & OBJECTIVE
Interval History:  Patient seen and examined.  NAD.  Calm.  She is medically stable currently and awaiting placement.    Review of Systems   Unable to perform ROS: Dementia   Constitutional:  Negative for chills and fever.   Respiratory:  Negative for cough and shortness of breath.    Psychiatric/Behavioral:  Positive for confusion.      Objective:     Vital Signs (Most Recent):  Temp: 97.5 °F (36.4 °C) (11/21/24 1130)  Pulse: 84 (11/21/24 1130)  Resp: 18 (11/21/24 1130)  BP: 118/69 (11/21/24 1130)  SpO2: 97 % (11/21/24 1130) Vital Signs (24h Range):  Temp:  [97.4 °F (36.3 °C)-98.4 °F (36.9 °C)] 97.5 °F (36.4 °C)  Pulse:  [67-93] 84  Resp:  [15-18] 18  SpO2:  [96 %-98 %] 97 %  BP: (109-142)/(69-82) 118/69     Weight: 56 kg (123 lb 7.3 oz)  Body mass index is 19.93 kg/m².    Intake/Output Summary (Last 24 hours) at 11/21/2024 1433  Last data filed at 11/21/2024 1337  Gross per 24 hour   Intake 720 ml   Output 500 ml   Net 220 ml         Physical Exam  Vitals and nursing note reviewed.   Constitutional:       Appearance: Normal appearance.   HENT:      Head: Normocephalic and atraumatic.   Cardiovascular:      Rate and Rhythm: Normal rate and regular rhythm.   Pulmonary:      Effort: Pulmonary effort is normal.      Breath sounds: Normal breath sounds.   Abdominal:      General: Abdomen is flat. Bowel sounds are normal.      Palpations: Abdomen is soft.   Musculoskeletal:      Comments: Decreased use of lower extremities in bed, though able to move both equally   Skin:     General: Skin is warm and dry.      Capillary Refill: Capillary refill takes less than 2 seconds.   Neurological:      General: No focal deficit present.      Mental Status: She is alert.             Significant Labs: All pertinent labs within the past 24 hours have been reviewed.  CBC:   Recent Labs   Lab 11/20/24  0542 11/21/24  0703   WBC 6.69 8.06   HGB 11.5* 11.4*   HCT 37.5 37.1    169     CMP:   Recent Labs   Lab 11/20/24  0574  11/21/24  0703    140   K 4.1 3.8    109   CO2 29 22*   GLU 98 87   BUN 19 23   CREATININE 1.3 1.1   CALCIUM 9.7 9.0   PROT 7.1 7.1   ALBUMIN 3.6 3.5   BILITOT 0.5 0.6   ALKPHOS 64 62   AST 29 71*   ALT 22 58*   ANIONGAP 7* 9       Significant Imaging: I have reviewed all pertinent imaging results/findings within the past 24 hours.

## 2024-11-21 NOTE — PLAN OF CARE
8:51am- Per Melody tita is still pending insurance asking for updated therapy notes and MD progress notes. SW sent via [employer]   11/21/24 6341   Post-Acute Status   Post-Acute Authorization Placement   Post-Acute Placement Status Pending payor review/awaiting authorization (if required)   Discharge Plan   Discharge Plan A Skilled Nursing Facility   Discharge Plan B Skilled Nursing Facility

## 2024-11-22 VITALS
SYSTOLIC BLOOD PRESSURE: 116 MMHG | WEIGHT: 123.44 LBS | HEART RATE: 69 BPM | DIASTOLIC BLOOD PRESSURE: 80 MMHG | RESPIRATION RATE: 18 BRPM | BODY MASS INDEX: 19.84 KG/M2 | TEMPERATURE: 98 F | OXYGEN SATURATION: 98 % | HEIGHT: 66 IN

## 2024-11-22 LAB
ALBUMIN SERPL BCP-MCNC: 3.4 G/DL (ref 3.5–5.2)
ALP SERPL-CCNC: 61 U/L (ref 55–135)
ALT SERPL W/O P-5'-P-CCNC: 56 U/L (ref 10–44)
ANION GAP SERPL CALC-SCNC: 8 MMOL/L (ref 8–16)
AST SERPL-CCNC: 46 U/L (ref 10–40)
BASOPHILS # BLD AUTO: 0.07 K/UL (ref 0–0.2)
BASOPHILS NFR BLD: 1 % (ref 0–1.9)
BILIRUB SERPL-MCNC: 0.6 MG/DL (ref 0.1–1)
BUN SERPL-MCNC: 22 MG/DL (ref 8–23)
CALCIUM SERPL-MCNC: 9 MG/DL (ref 8.7–10.5)
CHLORIDE SERPL-SCNC: 105 MMOL/L (ref 95–110)
CO2 SERPL-SCNC: 27 MMOL/L (ref 23–29)
CREAT SERPL-MCNC: 1.1 MG/DL (ref 0.5–1.4)
DIFFERENTIAL METHOD BLD: ABNORMAL
EOSINOPHIL # BLD AUTO: 0.3 K/UL (ref 0–0.5)
EOSINOPHIL NFR BLD: 3.8 % (ref 0–8)
ERYTHROCYTE [DISTWIDTH] IN BLOOD BY AUTOMATED COUNT: 15.1 % (ref 11.5–14.5)
EST. GFR  (NO RACE VARIABLE): 50.5 ML/MIN/1.73 M^2
GLUCOSE SERPL-MCNC: 88 MG/DL (ref 70–110)
HCT VFR BLD AUTO: 35.1 % (ref 37–48.5)
HGB BLD-MCNC: 10.8 G/DL (ref 12–16)
IMM GRANULOCYTES # BLD AUTO: 0.04 K/UL (ref 0–0.04)
IMM GRANULOCYTES NFR BLD AUTO: 0.6 % (ref 0–0.5)
LYMPHOCYTES # BLD AUTO: 1.9 K/UL (ref 1–4.8)
LYMPHOCYTES NFR BLD: 28.4 % (ref 18–48)
MAGNESIUM SERPL-MCNC: 1.9 MG/DL (ref 1.6–2.6)
MCH RBC QN AUTO: 26.4 PG (ref 27–31)
MCHC RBC AUTO-ENTMCNC: 30.8 G/DL (ref 32–36)
MCV RBC AUTO: 86 FL (ref 82–98)
MONOCYTES # BLD AUTO: 0.5 K/UL (ref 0.3–1)
MONOCYTES NFR BLD: 7.7 % (ref 4–15)
NEUTROPHILS # BLD AUTO: 4 K/UL (ref 1.8–7.7)
NEUTROPHILS NFR BLD: 58.5 % (ref 38–73)
NRBC BLD-RTO: 0 /100 WBC
PLATELET # BLD AUTO: 191 K/UL (ref 150–450)
PMV BLD AUTO: 11.6 FL (ref 9.2–12.9)
POTASSIUM SERPL-SCNC: 3.3 MMOL/L (ref 3.5–5.1)
PROT SERPL-MCNC: 6.7 G/DL (ref 6–8.4)
RBC # BLD AUTO: 4.09 M/UL (ref 4–5.4)
SODIUM SERPL-SCNC: 140 MMOL/L (ref 136–145)
WBC # BLD AUTO: 6.79 K/UL (ref 3.9–12.7)

## 2024-11-22 PROCEDURE — 85025 COMPLETE CBC W/AUTO DIFF WBC: CPT | Performed by: NURSE PRACTITIONER

## 2024-11-22 PROCEDURE — 25000003 PHARM REV CODE 250: Performed by: INTERNAL MEDICINE

## 2024-11-22 PROCEDURE — 25000003 PHARM REV CODE 250: Performed by: NURSE PRACTITIONER

## 2024-11-22 PROCEDURE — 83735 ASSAY OF MAGNESIUM: CPT | Performed by: NURSE PRACTITIONER

## 2024-11-22 PROCEDURE — 80053 COMPREHEN METABOLIC PANEL: CPT | Performed by: NURSE PRACTITIONER

## 2024-11-22 PROCEDURE — 36415 COLL VENOUS BLD VENIPUNCTURE: CPT | Performed by: NURSE PRACTITIONER

## 2024-11-22 PROCEDURE — 97535 SELF CARE MNGMENT TRAINING: CPT

## 2024-11-22 RX ADMIN — METOPROLOL SUCCINATE 25 MG: 25 TABLET, FILM COATED, EXTENDED RELEASE ORAL at 09:11

## 2024-11-22 RX ADMIN — CLOPIDOGREL BISULFATE 75 MG: 75 TABLET, FILM COATED ORAL at 09:11

## 2024-11-22 RX ADMIN — POTASSIUM BICARBONATE 35 MEQ: 391 TABLET, EFFERVESCENT ORAL at 09:11

## 2024-11-22 RX ADMIN — DONEPEZIL HYDROCHLORIDE 10 MG: 5 TABLET, FILM COATED ORAL at 09:11

## 2024-11-22 RX ADMIN — POTASSIUM BICARBONATE 35 MEQ: 391 TABLET, EFFERVESCENT ORAL at 11:11

## 2024-11-22 RX ADMIN — LEVOTHYROXINE SODIUM 75 MCG: 0.03 TABLET ORAL at 05:11

## 2024-11-22 RX ADMIN — LAMOTRIGINE 100 MG: 100 TABLET ORAL at 09:11

## 2024-11-22 RX ADMIN — LACTULOSE 20 G: 20 SOLUTION ORAL at 09:11

## 2024-11-22 RX ADMIN — LEVETIRACETAM 250 MG: 250 TABLET, FILM COATED ORAL at 09:11

## 2024-11-22 RX ADMIN — THERA TABS 1 TABLET: TAB at 09:11

## 2024-11-22 RX ADMIN — ATORVASTATIN CALCIUM 80 MG: 40 TABLET, FILM COATED ORAL at 09:11

## 2024-11-22 NOTE — PLAN OF CARE
SW spoke with Pt son via phone to get preference on HH. Per son they used [employer] before and would like to use that same company again. Referral sent via [employer]     1:05pm : [facility] they are currently at they limit for [facility] Pt. [facility]erral sent to [facility]     2:12pm- [facility] unable to secure HH yet [facility] currently reviewing Pt. No answer as of yet      11/22/24 1234   Post-Acute Status   Post-Acute Authorization Home Health   Home Health Status Referrals Sent   Discharge Plan   Discharge Plan A Home Health   Discharge Plan B Home Health

## 2024-11-22 NOTE — NURSING
All discharge instructions given. Answered all questions. Removed saline lock. cardiac monitor placed at desk with . Escorted patient out to monitor vehicle. Patient to go home to son via EMS

## 2024-11-22 NOTE — PLAN OF CARE
[employer] denied SNF feels Pt is at baseline. SW spoke with Pt son Gigi states he is not ready for Pt to do group home placement and will take Pt home. Per son Pt will need a ambulance back home pending Pt being medically ready for dc    11/22/24 0920   Post-Acute Status   Post-Acute Authorization Placement   Discharge Plan   Discharge Plan A Home with family   Discharge Plan B Home with family

## 2024-11-22 NOTE — PLAN OF CARE
Patient cleared for discharge from case management standpoint.     Follow up appointments scheduled and added to AVS. Pt to dc home with HH with [facility] 11/23/24. SW set up ADT30 for transport home Pt is bedbound and Son unable to get home.        Chart and discharge orders reviewed.  Patient discharged home with no further case management needs.     11/22/24 3967   Final Note   Assessment Type Final Discharge Note   Anticipated Discharge Disposition Home-Health   Post-Acute Status   Home Health Status Set-up Complete/Auth obtained   Discharge Delays None known at this time

## 2024-11-22 NOTE — PLAN OF CARE
Hospital follow up scheduled. Noted for pt's family to call and request a virtual appt if that would be better for them. Unable to do NP at home r/t living in [place]. Pt was referred to [facility]       11/22/24 1239   Post-Acute Status   Hospital Resources/Appts/Education Provided Appointments scheduled and added to AVS

## 2024-11-22 NOTE — PLAN OF CARE
Patient cleared for discharge from case management standpoint.    Follow up appointments scheduled and added to AVS. Additional HH refferal sent pending a accepting  facility. SW set up ADT30 for transport home Pt is bedbound and Son unable to get home.       Chart and discharge orders reviewed.  Patient discharged home with no further case management needs.     11/22/24 1419   Final Note   Assessment Type Final Discharge Note   Anticipated Discharge Disposition Home   Post-Acute Status   Home Health Status Referrals Sent   Discharge Delays None known at this time

## 2024-11-22 NOTE — DISCHARGE SUMMARY
[facility] Medicine  Discharge Summary      Patient Name: Alondra Pacheco  MRN: [id number]  MARISEL: [id number]  Patient Class: IP- Inpatient  Admission Date: 11/16/2024  Hospital Length of Stay: 6 days  Discharge Date and Time: 11/22/2024  3:20 PM  Attending Physician: Derick Sanchez MD  Discharging Provider: KAIDEN Lagos  Primary Care Provider: Jak Park MD    Primary Care Team: Networked reference to record PCT     HPI:   79-year-old female with pMHx of dementia, HTN, HLD, RA, and AAA who presented to the ED via EMS with family reports of failure to thrive.  According to EMS reports since patient's discharge 10/29/24 she has gotten progressively weaker and has not been eating.  Family also reports that the patient has been out of her medication, Aricept x5 days.  Son reported to EMS that he is unsure what he can care for his mother at this point.  Blood work performed in the ED with a sodium of 147, potassium 3, CO2 33, BUN 33, creatinine 1.5, calcium 12.1, GFR 34.8.  Urine is negative for infection.  Patient will be admitted for further evaluation and treatment.    * No surgery found *      Hospital Course:   Alondra Pacheco is an 79 year old female with PMHx of dementia, HLD, CKD stage 3, HTN, seizure disorder, RA, AAA, and stroke.  She was admitted for acute on chronic renal failure and FTT.  She was treated with IVFs and restarted on Aricept, which she had been off of for 5 days prior to hospitalization.  She had improvement in kidney function and overall mental status.  Today, patient was sitting up in bed, eating breakfast.  Registered dietician was consulted.  PT/OT were ordered, recommended moderate intensity therapy.   and  worked on placement.  SNF was denied.  Discussed with son (Gigi), ultimate plan is for [facility] placement which he has already started and he will continue to work on that at home.  Home health was ordered.  Plan  of care was discussed with patient's son and he verbalized understanding.  Patient was seen and examined on the day of discharge.     Goals of Care Treatment Preferences:  Code Status: Full Code      SDOH Screening:  The patient declined to be screened for utility difficulties, food insecurity, transport difficulties, housing insecurity, and interpersonal safety, so no concerns could be identified this admission.     Consults:   Consults (From admission, onward)          Status Ordering Provider     Inpatient consult to   Once        Provider:  (Not yet assigned)    Acknowledged CHRIST ARTIS     Inpatient consult to Registered Dietitian/Nutritionist  Once        Provider:  (Not yet assigned)    Completed JORGE LUIS ARRIAGA     Case Management/  Once        Provider:  (Not yet assigned)    Completed MAYCOL CHAU new Assessment & Plan notes have been filed under this hospital service since the last note was generated.  Service: Hospital Medicine    Final Active Diagnoses:    Diagnosis Date Noted POA    PRINCIPAL PROBLEM:  Acute on chronic renal failure [N17.9, N18.9] 11/16/2024 Yes    Moderate malnutrition [E44.0] 11/18/2024 Yes    Failure to thrive in adult [R62.7] 11/16/2024 Yes    Seizure disorder [G40.909] 04/24/2020 Yes    Hyperlipidemia [E78.5] 04/13/2020 Yes    Hypertension, essential [I10] 04/13/2020 Yes    Dementia [F03.90] 12/05/2019 Yes      Problems Resolved During this Admission:    Diagnosis Date Noted Date Resolved POA    Hypernatremia [E87.0] 11/16/2024 11/21/2024 Yes    Hypokalemia [E87.6] 11/16/2024 11/18/2024 Yes    Hypercalcemia [E83.52] 11/16/2024 11/18/2024 Yes       Discharged Condition: good    Disposition: Home or Self Care    Follow Up:   Follow-up Information       Jak Park MD Follow up in 1 week(s).    Specialty: Family Medicine  Why: 12/2 @ 9am  please call clinic if you would like to change this to a vitrual appt.  Contact  information:  [address]  [address]  [place] [address]  [phone]               [facility]. Call.    Specialty: Home Health Services  Why: [facility]  will call for HH  assessment  Contact information:  [address]  [place] [address]  [phone]                           Patient Instructions:      Ambulatory referral/consult to Outpatient Case Management   Referral Priority: Routine Referral Type: Consultation   Referral Reason: Specialty Services Required   Number of Visits Requested: 1     Ambulatory referral/consult to Home Health   Standing Status: Future   Referral Priority: Routine Referral Type: Home Health   Referral Reason: Specialty Services Required   Requested Specialty: Home Health Services   Number of Visits Requested: 1     Diet Cardiac     Notify your health care provider if you experience any of the following:  temperature >100.4     Notify your health care provider if you experience any of the following:  persistent nausea and vomiting or diarrhea     Notify your health care provider if you experience any of the following:  severe uncontrolled pain     Notify your health care provider if you experience any of the following:  difficulty breathing or increased cough     Notify your health care provider if you experience any of the following:  severe persistent headache     Notify your health care provider if you experience any of the following:  persistent dizziness, light-headedness, or visual disturbances     Notify your health care provider if you experience any of the following:  increased confusion or weakness     Notify your health care provider if you experience any of the following:   Order Comments: Chest pain, shortness of breath     Notify your health care provider if you experience any of the following:  temperature >100.4     Notify your health care provider if you experience any of the following:  persistent nausea and vomiting or diarrhea      Notify your health care provider if you experience any of the following:  redness, tenderness, or signs of infection (pain, swelling, redness, odor or green/yellow discharge around incision site)     Notify your health care provider if you experience any of the following:  difficulty breathing or increased cough     Notify your health care provider if you experience any of the following:  persistent dizziness, light-headedness, or visual disturbances     Notify your health care provider if you experience any of the following:  increased confusion or weakness     Notify your health care provider if you experience any of the following:  severe persistent headache     Notify your health care provider if you experience any of the following:   Order Comments: Chest pain, shortness of breath     Activity as tolerated       Significant Diagnostic Studies: Labs: CMP   Recent Labs   Lab 11/21/24  0703 11/22/24  0556    140   K 3.8 3.3*    105   CO2 22* 27   GLU 87 88   BUN 23 22   CREATININE 1.1 1.1   CALCIUM 9.0 9.0   PROT 7.1 6.7   ALBUMIN 3.5 3.4*   BILITOT 0.6 0.6   ALKPHOS 62 61   AST 71* 46*   ALT 58* 56*   ANIONGAP 9 8    and CBC   Recent Labs   Lab 11/21/24  0703 11/22/24  0556   WBC 8.06 6.79   HGB 11.4* 10.8*   HCT 37.1 35.1*    191       Pending Diagnostic Studies:       None           Medications:  Reconciled Home Medications:      Medication List        START taking these medications      lactulose 20 gram/30 mL Soln  Commonly known as: CHRONULAC  Take 30 mLs (20 g total) by mouth 3 (three) times daily.            CONTINUE taking these medications      alendronate 70 MG tablet  Commonly known as: FOSAMAX  TAKE 1 TABLET(70 MG) BY MOUTH EVERY 7 DAYS     atorvastatin 80 MG tablet  Commonly known as: LIPITOR  TAKE ONE TABLET BY MOUTH EVERY DAY     clopidogreL 75 mg tablet  Commonly known as: PLAVIX  TAKE 1 TABLET BY MOUTH EVERY DAY     cyproheptadine 4 mg tablet  Commonly known as:  PERIACTIN  Take 1 tablet (4 mg total) by mouth every evening.     diclofenac sodium 1 % Gel  Commonly known as: VOLTAREN  Apply 2 g topically 4 (four) times daily.     donepeziL 10 MG tablet  Commonly known as: ARICEPT  TAKE ONE TABLET BY MOUTH EVERY DAY     fenofibrate 160 MG Tab  Take 160 mg by mouth once daily.     lamoTRIgine 100 MG tablet  Commonly known as: LAMICTAL  Take 100 mg by mouth 2 (two) times daily.     levETIRAcetam 250 MG Tab  Commonly known as: KEPPRA  Take 250 mg by mouth 2 (two) times daily.     levothyroxine 75 MCG tablet  Commonly known as: SYNTHROID  TAKE ONE TABLET BY MOUTH ONCE DAILY     metoprolol succinate 25 MG 24 hr tablet  Commonly known as: TOPROL-XL  TAKE ONE TABLET BY MOUTH ONCE DAILY     multivitamin capsule  Take 1 capsule by mouth once daily.     pantoprazole 40 MG tablet  Commonly known as: PROTONIX  Take 1 tablet (40 mg total) by mouth once daily.            STOP taking these medications      calcium carbonate 600 mg calcium (1,500 mg) Tab  Commonly known as: OS-NEETA     losartan 50 MG tablet  Commonly known as: COZAAR              Indwelling Lines/Drains at time of discharge:   Lines/Drains/Airways       None                   Time spent on the discharge of patient: 35 minutes         KAIDEN Lagos  Department of Hospital Medicine  [facility]

## 2024-11-22 NOTE — PT/OT/SLP PROGRESS
Occupational Therapy   Treatment    Name: Alondra Pacheco  MRN: [id number]  Admitting Diagnosis:  Acute on chronic renal failure     The primary encounter diagnosis was Acute kidney injury superimposed on chronic kidney disease. Diagnoses of Dehydration, LORETTA (acute kidney injury), Hypercalcemia, Failure to thrive in adult, Hypokalemia, and Chest pain were also pertinent to this visit.  Recommendations:     Discharge Recommendations: No Therapy Indicated  Discharge Equipment Recommendations:   (continue to assess)  Barriers to discharge:   (increased caregiver budern of care)    Assessment:     Alondra Pacheco is a 79 y.o. female with a medical diagnosis of Acute on chronic renal failure.  She presents with Performance deficits affecting function are weakness, impaired endurance, impaired self care skills, impaired functional mobility, gait instability, impaired balance, impaired cognition, decreased upper extremity function, decreased lower extremity function, decreased safety awareness, impaired cardiopulmonary response to activity.     Rehab Prognosis:  Fair and Poor; patient would benefit from acute skilled OT services to address these deficits and reach maximum level of function.       Plan:     Patient to be seen 3 x/week to address the above listed problems via self-care/home management, therapeutic activities, therapeutic exercises  Plan of Care Expires: 12/18/24  Plan of Care Reviewed with: patient    Subjective     Chief Complaint: none  Patient/Family Comments/goals: When am I going home?  Pain/Comfort:  Pain Rating 1: 0/10  Pain Rating Post-Intervention 1: 0/10    Objective:     Communicated with: nurse prior to session.  Patient found HOB elevated with bed alarm, telemetry, peripheral IV, PureWick upon OT entry to room.    General Precautions: Standard, fall, seizure    Orthopedic Precautions:N/A  Braces: N/A  Respiratory Status: Room air     Occupational Performance:     Activities of Daily  Living:  Feeding:  stand by assistance to feed self small dessert from container using utensil and sip water from cup with HOB elevated. Mod vc to redirect.  Grooming: stand by assistance to wipe hands with napkin      AMPAC 6 Click ADL: 9    Treatment & Education:  Safe self care retraining     Patient left HOB elevated with all lines intact, call button in reach, and bed alarm on    GOALS:   Multidisciplinary Problems       Occupational Therapy Goals          Problem: Occupational Therapy    Goal Priority Disciplines Outcome Interventions   Occupational Therapy Goal     OT, PT/OT Progressing    Description: Goals to be met by: 11/30/2024     Patient will increase functional independence with ADLs by performing:    Feeding with Supervision.  Sitting at edge of bed x 20 minutes with Supervision.  Supine to sit with Minimal Assistance.  Upgrade goals as appropriate.                         Time Tracking:     OT Date of Treatment: 11/22/24  OT Start Time: 1337  OT Stop Time: 1352  OT Total Time (min): 15 min    Billable Minutes:Self Care/Home Management 15  Total Time 15    OT/ZULEMA: OT          11/22/2024

## 2024-11-25 ENCOUNTER — PATIENT OUTREACH (OUTPATIENT)
Dept: FAMILY MEDICINE | Facility: CLINIC | Age: 79
End: 2024-11-25
Payer: MEDICARE

## 2024-11-25 ENCOUNTER — PATIENT OUTREACH (OUTPATIENT)
Dept: ADMINISTRATIVE | Facility: CLINIC | Age: 79
End: 2024-11-25
Payer: MEDICARE

## 2024-11-25 ENCOUNTER — TELEPHONE (OUTPATIENT)
Dept: FAMILY MEDICINE | Facility: CLINIC | Age: 79
End: 2024-11-25
Payer: MEDICARE

## 2024-11-25 NOTE — PROGRESS NOTES
C3 nurse spoke with Alondra Pacheco's son Gigi for a TCC post hospital discharge follow up call. The patient has a scheduled HOS appointment with Jak Park MD on 12/02/24 @ 0900.

## 2024-11-25 NOTE — TELEPHONE ENCOUNTER
----- Message from Nurse Mcguire sent at 11/25/2024 11:49 AM CST -----  Call patient - needs post-hospital phone call within 2 business days and hospital follow up visit scheduled within 7-14 days.

## 2024-11-25 NOTE — PROGRESS NOTES
C3 nurse attempted to contact Alondra Pacheco for a TCC post hospital discharge follow up call. The patient is unable to conduct the call @ this time. The patient requested a callback.    The patient has a scheduled \[facility] [place] [facility]\"" appointment with Jak Park MD  on 12/02/24 @ 0900.

## 2024-11-25 NOTE — TELEPHONE ENCOUNTER
Discharge Information     Discharge Date:   11/22/2024    Primary Discharge Diagnosis:  Acute on chronic renal failure [N17.9, N18.9]       Discharge Summary:  Reviewed      Medication & Order Review     Were medication changes made or new medications added?  yes    If so, has the patient filled the prescriptions?  No     Was Home Health ordered? Yes    If so, has Home Health contacted patient and/or initiated services?  Yes    Name of Home Health Agency?  [facility]    Durable Medical Equipment ordered?  No     If so, has the DME provider contacted patient and delivered equipment?  N/A    Follow Up               Any problems since discharge? No    How is the patient feeling since returning home?      Have you set up recommended follow up appointments?  (cardiology, surgery, etc.)    Schedule Hospital Follow-up appointment within 7-14 days (preferably 7).      Notes:  HFU scheduled for 12/04/2024          Rashawn Aguirre

## 2024-12-04 ENCOUNTER — OFFICE VISIT (OUTPATIENT)
Dept: FAMILY MEDICINE | Facility: CLINIC | Age: 79
End: 2024-12-04
Payer: MEDICARE

## 2024-12-04 VITALS
TEMPERATURE: 98 F | RESPIRATION RATE: 18 BRPM | DIASTOLIC BLOOD PRESSURE: 70 MMHG | HEART RATE: 72 BPM | BODY MASS INDEX: 19.93 KG/M2 | HEIGHT: 66 IN | SYSTOLIC BLOOD PRESSURE: 106 MMHG

## 2024-12-04 DIAGNOSIS — I70.0 AORTIC ATHEROSCLEROSIS: ICD-10-CM

## 2024-12-04 DIAGNOSIS — N18.31 STAGE 3A CHRONIC KIDNEY DISEASE: ICD-10-CM

## 2024-12-04 DIAGNOSIS — K59.09 CHRONIC CONSTIPATION: Primary | ICD-10-CM

## 2024-12-04 DIAGNOSIS — Z23 IMMUNIZATION DUE: ICD-10-CM

## 2024-12-04 DIAGNOSIS — M80.00XD AGE-RELATED OSTEOPOROSIS WITH CURRENT PATHOLOGICAL FRACTURE WITH ROUTINE HEALING, SUBSEQUENT ENCOUNTER: ICD-10-CM

## 2024-12-04 PROBLEM — M81.0 OSTEOPOROSIS: Status: ACTIVE | Noted: 2024-12-04

## 2024-12-04 PROCEDURE — 99999 PR PBB SHADOW E&M-EST. PATIENT-LVL III: CPT | Mod: PBBFAC,HCNC,, | Performed by: FAMILY MEDICINE

## 2024-12-04 RX ORDER — LACTULOSE 10 G/15ML
20 SOLUTION ORAL DAILY
Start: 2024-12-04

## 2024-12-04 NOTE — PROGRESS NOTES
Subjective:       Patient ID: Alondra Pacheco is a 79 y.o. female.    Chief Complaint: HFU    SUBJECTIVE:    Patient ID: Alondra Pacheco is a 79 y.o. female.    Chief Complaint: HFU    HPI    Admit Date: 11/15/24  Discharge Date: 1122/24  Discharge Facility: Hospital      Family and/or Caretaker present at visit? Yes  Medication Reconciliation:  Medications changed/added/deleted. Aricept resumed  New Prescriptions filled after discharge: yes  Discharge summary reviewed:  yes  Diagnostic tests reviewed/disposition: I have reviewed all completed as well as pending diagnostic tests at the time of discharge  Disease/illness education: dementia  Follow up appointments scheduled:  yes                Follow up labs/tests ordered:   yes  Home Health ordered on discharge: Patient has home health established at    [facility] company name: [employer]  Establishment or re-establishment of referral orders for community resources: No other necessary community resources  DME ordered at discharge:   yes  How patient is feeling since discharge from the hospital?  stable dual incontinent of bladder and stool     Discussion with other health care providers: No discussion with other health care providers necessary  Subjective:       Patient ID: Alondra Pacheco is a 79 y.o. female.    Chief Complaint: HFU    Wt Readings from Last 4 Encounters:   11/17/24 56 kg (123 lb 7.3 oz)   08/03/24 63 kg (138 lb 14.2 oz)   07/28/23 56.7 kg (125 lb)   01/27/23 54.4 kg (120 lb)         History of Present Illness    CHIEF COMPLAINT:  Ms. Pacheco presents for a follow-up visit after a recent hospitalization for acute renal failure and to discuss ongoing care for her dementia and mobility issues.    HPI:  Ms. Pacheco, an elderly female, was recently hospitalized for acute renal failure due to dehydration and electrolyte imbalance, specifically hypernatremic volume depletion, within the past 2 weeks. She had previously fallen and fractured  "her femur in early August. Following the femur fracture, possibly related to anesthesia, the patient developed total bladder control loss and urinary incontinence. Ms. Pacheco's son reports using a PureWick device to manage this, but the patient frequently removes it.    Ms. Pacheco also has bowel incontinence, potentially related to stage 3 dementia. She is often unaware of bowel movements, even when seated on the toilet. This issue developed after the femur fracture and subsequent anesthesia.    Ms. Sows mobility has significantly decreased. After a hospital stay for bowel blockage at [facility], she had difficulty lifting her feet to take complete steps. Her condition deteriorated to the point where she lost function in her hands and legs and was unable to feed herself.    Ms. Pacheco had an incident of respiratory distress, gasping for air twice, leading to another hospitalization at [facility]. The son suspects this may have been an aspiration episode.    Ms. Pacheco had a period without her donepezil medication for about a week, referred to as the "donepezil episode." This led to a significant decline in her condition, initially mistaken for Lewy body dementia progression but later determined to be related to renal issues.    Currently, the patient's appetite is improving. She is consuming full breakfast sandwiches and dinner portions, an improvement from previous half portions. She is constipated, not having had a bowel movement for a few days.    Ms. Pacheco is receiving home health care from [facility]. She has upcoming appointments scheduled with a neurologist for a conservatorship evaluation and potentially with an infusion center for osteoporosis treatment.    Ms. Pacheco denies any current flu-like symptoms. Ms. Pacheco's son denies that she has been diagnosed with Lewy body disease.    MEDICATIONS:  Ms. Pacheco is on Aricept (Donepezil), Lamictal and Keppra, both taken as 1 " tablet twice daily. She is also taking Centrum vitamin supplement.    MEDICAL HISTORY:  Ms. Pacheco has a history of dementia, chronic renal failure, and osteoporosis. She is suspected to have Lewy body disease. 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    SURGICAL HISTORY:  Ms. Pacheco underwent femur fracture repair in early August.    TEST RESULTS:  Recent electrolyte tests revealed low electrolytes and high sodium levels, indicating hypernatremic volume depletion.    IMAGING:  An X-ray of the femur was recently ordered by Dr. Gunn to follow up on the patient's femur fracture.      ROS:  Constitutional: -fevers  Eyes: +vision changes  Respiratory: +shortness of breath  Gastrointestinal: +constipation  Genitourinary: +urinary incontinence  Musculoskeletal: +muscle weakness          Allergies and Medications:   Review of patient's allergies indicates:   Allergen Reactions    Anesthesia s/i-40 (propofol) [propofol]      Current Outpatient Medications   Medication Sig Dispense Refill    atorvastatin (LIPITOR) 80 MG tablet TAKE ONE TABLET BY MOUTH EVERY DAY 90 tablet 1    diclofenac sodium (VOLTAREN) 1 % Gel Apply 2 g topically 4 (four) times daily. (Patient taking differently: Apply 2 g topically 4 (four) times daily as needed.) 200 g 5    donepeziL (ARICEPT) 10 MG tablet TAKE ONE TABLET BY MOUTH EVERY DAY 90 tablet 2    fenofibrate 160 MG Tab Take 160 mg by mouth once daily.      lamoTRIgine (LAMICTAL) 100 MG tablet Take 100 mg by mouth 2 (two) times daily.       levETIRAcetam (KEPPRA) 250 MG Tab Take 250 mg by mouth 2 (two) times daily.      levothyroxine (SYNTHROID) 75 MCG tablet TAKE ONE TABLET BY MOUTH ONCE DAILY 90 tablet 1    metoprolol succinate (TOPROL-XL) 25 MG 24 hr tablet TAKE ONE TABLET BY MOUTH ONCE DAILY 90 tablet 1    multivitamin capsule Take 1 capsule by mouth once daily.      pantoprazole (PROTONIX) 40 MG tablet Take 1 tablet (40 mg total) by mouth once daily. 90 tablet 1     lactulose (CHRONULAC) 20 gram/30 mL Soln Take 30 mLs (20 g total) by mouth once daily.       No current facility-administered medications for this visit.       Family History:   Family History   Problem Relation Name Age of Onset    Cancer Father      Diabetes Sister      Heart attack Paternal Uncle         Social History:   Social History     Socioeconomic History    Marital status:    Tobacco Use    Smoking status: Former     Current packs/day: 0.00     Average packs/day: 1.5 packs/day for 12.0 years (18.0 ttl pk-yrs)     Types: Cigarettes     Start date: 1963     Quit date: 1975     Years since quittin.8    Smokeless tobacco: Never   Substance and Sexual Activity    Alcohol use: No    Drug use: No    Sexual activity: Not Currently     Social Drivers of Health     Financial Resource Strain: Patient Declined (2024)    Overall Financial Resource Strain (CARDIA)     Difficulty of Paying Living Expenses: Patient declined   Food Insecurity: Patient Declined (2024)    Hunger Vital Sign     Worried About Running Out of Food in the Last Year: Patient declined     Ran Out of Food in the Last Year: Patient declined   Transportation Needs: No Transportation Needs (2024)    TRANSPORTATION NEEDS     Transportation : No   Physical Activity: Inactive (10/28/2024)    [facility]    Exercise Vital Sign     Days of Exercise per Week: 0 days     Minutes of Exercise per Session: 0 min   Stress: Patient Declined (2024)    [employer] - Occupational Stress Questionnaire     Feeling of Stress : Patient declined   Housing Stability: Patient Declined (2024)    Housing Stability Vital Sign     Unable to Pay for Housing in the Last Year: Patient declined     Homeless in the Last Year: Patient declined           Objective:     Vitals:    24 1537   BP: 106/70   Pulse: 72   Resp: 18   Temp: 98.4 °F (36.9  °C)        Physical Exam    Vitals: Weight: 123 lbs.  General: No acute distress. Well-developed. Well-nourished.  Eyes: EOMI. Sclerae anicteric.  HENT: Normocephalic. Atraumatic. Nares patent. Moist oral mucosa.  Cardiovascular: Regular rate. Regular rhythm. No murmurs. No rubs. No gallops. Normal S1, S2.  Respiratory: Normal respiratory effort. Clear to auscultation bilaterally. No rales. No rhonchi. No wheezing.  Musculoskeletal: No  obvious deformity.  Extremities: No lower extremity edema.  Neurological: Alert & oriented x3. No slurred speech. Normal gait.  Psychiatric: Normal mood. Normal affect. Good insight. Good judgment.  Skin: Warm. Dry. No rash.            Assessment:       1. Chronic constipation    2. Age-related osteoporosis with current pathological fracture with routine healing, subsequent encounter    3. Aortic atherosclerosis    4. Stage 3a chronic kidney disease        Plan:       Assessment & Plan    IMPRESSION:  - Assessed recent hospitalization for acute renal failure with hypernatremic volume depletion  - Evaluated impact of anesthesia from femur surgery on dementia progression  - Discontinued Periactin due to sedation risk  - Recommend Prolia injection for osteoporosis management  - Determined flu vaccine appropriate, advised against COVID vaccine due to potential side effects    DEMENTIA:  - Discussed stages of dementia progression, including loss of bowel and bladder control in stage 3.  - Explained potential impact of anesthesia on dementia symptoms.  - Restarted Aricept.    OSTEOPOROSIS:  - Provided information on Prolia injection as alternative to oral osteoporosis medications.  - Discontinued Fosamax.  - Ordered Prolia (Denosumab) injection for osteoporosis, to be administered every 6 months.    CONSTIPATION:  - Started lactulose daily for constipation management.    SEIZURE DISORDER:  - Continued Lamictal and Keppra, 1 of each twice daily.    GENERAL HEALTH:  - Continued Centrum  vitamin supplement.  - Administered flu vaccine during visit.    MOBILITY ISSUES:  - Consider virtual visit option for future appointments due to patient's mobility issues.    FOLLOW-UP:  - Follow up in 3 months.  - Follow up for at least 1 face-to-face visit annually.        Alondra was seen today for hfu.    Diagnoses and all orders for this visit:    Chronic constipation  -     lactulose (CHRONULAC) 20 gram/30 mL Soln; Take 30 mLs (20 g total) by mouth once daily.    Age-related osteoporosis with current pathological fracture with routine healing, subsequent encounter    Aortic atherosclerosis    Stage 3a chronic kidney disease    Other orders  -     denosumab (PROLIA) injection 60 mg         Follow up in about 3 months (around 3/4/2025) for video visit.  This note was generated with the assistance of ambient listening technology. Verbal consent was obtained by the patient and accompanying visitor(s) for the recording of patient appointment to facilitate this note. I attest to having reviewed and edited the generated note for accuracy, though some syntax or spelling errors may persist. Please contact the author of this note for any clarification.     Patient follow up phone call documented on separate encounter.    No results displayed because visit has over 200 results.      Appointment on 08/10/2024   Component Date Value Ref Range Status    Specimen UA 08/10/2024 Urine, Clean Catch   Final    Color, UA 08/10/2024 Yellow  Yellow, Straw, Catherine Final    Appearance, UA 08/10/2024 Hazy (A)  Clear Final    pH, UA 08/10/2024 7.0  5.0 - 8.0 Final    Specific Gravity, UA 08/10/2024 1.020  1.005 - 1.030 Final    Protein, UA 08/10/2024 Trace (A)  Negative Final    Glucose, UA 08/10/2024 Negative  Negative Final    Ketones, UA 08/10/2024 Negative  Negative Final    Bilirubin (UA) 08/10/2024 Negative  Negative Final    Occult Blood UA 08/10/2024 2+ (A)  Negative Final    Nitrite, UA 08/10/2024 Positive (A)  Negative Final     Urobilinogen, UA 08/10/2024 Negative  <2.0 EU/dL Final    Leukocytes, UA 08/10/2024 3+ (A)  Negative Final    Urine Culture, Routine 08/10/2024  (A)   Final                    Value:MORGANELLA MORGANII  >100,000 cfu/ml      RBC, UA 08/10/2024 13 (H)  0 - 4 /hpf Final    WBC, UA 08/10/2024 >100 (H)  0 - 5 /hpf Final    Bacteria 08/10/2024 Many (A)  None-Occ /hpf Final    Squam Epithel, UA 08/10/2024 0  /hpf Final    Hyaline Casts, UA 08/10/2024 1  0-1/lpf /lpf Final    Microscopic Comment 08/10/2024 SEE COMMENT   Final   Admission on 08/03/2024, Discharged on 08/06/2024   Component Date Value Ref Range Status    WBC 08/03/2024 13.04 (H)  3.90 - 12.70 K/uL Final    RBC 08/03/2024 4.49  4.00 - 5.40 M/uL Final    Hemoglobin 08/03/2024 12.3  12.0 - 16.0 g/dL Final    Hematocrit 08/03/2024 38.1  37.0 - 48.5 % Final    MCV 08/03/2024 85  82 - 98 fL Final    MCH 08/03/2024 27.4  27.0 - 31.0 pg Final    MCHC 08/03/2024 32.3  32.0 - 36.0 g/dL Final    RDW 08/03/2024 13.8  11.5 - 14.5 % Final    Platelets 08/03/2024 245  150 - 450 K/uL Final    MPV 08/03/2024 11.0  9.2 - 12.9 fL Final    Immature Granulocytes 08/03/2024 0.6 (H)  0.0 - 0.5 % Final    Gran # (ANC) 08/03/2024 11.4 (H)  1.8 - 7.7 K/uL Final    Immature Grans (Abs) 08/03/2024 0.08 (H)  0.00 - 0.04 K/uL Final    Lymph # 08/03/2024 1.1  1.0 - 4.8 K/uL Final    Mono # 08/03/2024 0.4  0.3 - 1.0 K/uL Final    Eos # 08/03/2024 0.0  0.0 - 0.5 K/uL Final    Baso # 08/03/2024 0.04  0.00 - 0.20 K/uL Final    nRBC 08/03/2024 0  0 /100 WBC Final    Gran % 08/03/2024 87.4 (H)  38.0 - 73.0 % Final    Lymph % 08/03/2024 8.4 (L)  18.0 - 48.0 % Final    Mono % 08/03/2024 3.3 (L)  4.0 - 15.0 % Final    Eosinophil % 08/03/2024 0.0  0.0 - 8.0 % Final    Basophil % 08/03/2024 0.3  0.0 - 1.9 % Final    Differential Method 08/03/2024 Automated   Final    Sodium 08/03/2024 138  136 - 145 mmol/L Final    Potassium 08/03/2024 4.5  3.5 - 5.1 mmol/L Final    Chloride 08/03/2024 104  95 -  110 mmol/L Final    CO2 08/03/2024 21 (L)  22 - 31 mmol/L Final    Glucose 08/03/2024 135 (H)  70 - 110 mg/dL Final    BUN 08/03/2024 26 (H)  7 - 18 mg/dL Final    Creatinine 08/03/2024 1.09  0.50 - 1.40 mg/dL Final    Calcium 08/03/2024 9.7  8.4 - 10.2 mg/dL Final    Total Protein 08/03/2024 8.1  6.0 - 8.4 g/dL Final    Albumin 08/03/2024 4.4  3.5 - 5.2 g/dL Final    Total Bilirubin 08/03/2024 1.0  0.2 - 1.3 mg/dL Final    Alkaline Phosphatase 08/03/2024 78  38 - 145 U/L Final    AST 08/03/2024 46 (H)  14 - 36 U/L Final    ALT 08/03/2024 41 (H)  0 - 35 U/L Final    Anion Gap 08/03/2024 13 (H)  5 - 12 mmol/L Final    eGFR 08/03/2024 51 (A)  >60 mL/min/1.73 m^2 Final    QRS Duration 08/03/2024 86  ms Final    [facility] QTC Calculation 08/03/2024 495  ms Final    Troponin I 08/03/2024 <0.012  0.012 - 0.034 ng/mL Final    Specimen UA 08/03/2024 Urine, Clean Catch   Final    Color, UA 08/03/2024 Yellow  Yellow, Straw, Catherine Final    Appearance, UA 08/03/2024 Clear  Clear Final    pH, UA 08/03/2024 7.0  5.0 - 8.0 Final    Specific Gravity, UA 08/03/2024 1.025  1.005 - 1.030 Final    Protein, UA 08/03/2024 Trace (A)  Negative Final    Glucose, UA 08/03/2024 Negative  Negative Final    Ketones, UA 08/03/2024 Trace (A)  Negative Final    Bilirubin (UA) 08/03/2024 Negative  Negative Final    Occult Blood UA 08/03/2024 1+ (A)  Negative Final    Nitrite, UA 08/03/2024 Negative  Negative Final    Urobilinogen, UA 08/03/2024 0.2  <2.0 EU/dL Final    Leukocytes, UA 08/03/2024 Negative  Negative Final    RBC, UA 08/03/2024 25 (H)  0 - 4 /hpf Final    WBC, UA 08/03/2024 1  0 - 5 /hpf Final    Bacteria 08/03/2024 Negative  Negative /hpf Final    Squam Epithel, UA 08/03/2024 0  /hpf Final    Hyaline Casts, UA 08/03/2024 0  0 - 1 /lpf Final    Microscopic Comment 08/03/2024 SEE COMMENT   Final    WBC 08/04/2024 14.14 (H)  3.90 - 12.70 K/uL Final    RBC 08/04/2024 3.95 (L)  4.00 - 5.40 M/uL Final    Hemoglobin 08/04/2024 11.0 (L)  12.0 -  16.0 g/dL Final    Hematocrit 08/04/2024 34.9 (L)  37.0 - 48.5 % Final    MCV 08/04/2024 88  82 - 98 fL Final    MCH 08/04/2024 27.8  27.0 - 31.0 pg Final    MCHC 08/04/2024 31.5 (L)  32.0 - 36.0 g/dL Final    RDW 08/04/2024 14.2  11.5 - 14.5 % Final    Platelets 08/04/2024 187  150 - 450 K/uL Final    MPV 08/04/2024 10.5  9.2 - 12.9 fL Final    Immature Granulocytes 08/04/2024 0.6 (H)  0.0 - 0.5 % Final    Gran # (ANC) 08/04/2024 11.4 (H)  1.8 - 7.7 K/uL Final    Immature Grans (Abs) 08/04/2024 0.09 (H)  0.00 - 0.04 K/uL Final    Lymph # 08/04/2024 1.8  1.0 - 4.8 K/uL Final    Mono # 08/04/2024 0.8  0.3 - 1.0 K/uL Final    Eos # 08/04/2024 0.0  0.0 - 0.5 K/uL Final    Baso # 08/04/2024 0.02  0.00 - 0.20 K/uL Final    nRBC 08/04/2024 0  0 /100 WBC Final    Gran % 08/04/2024 80.9 (H)  38.0 - 73.0 % Final    Lymph % 08/04/2024 12.9 (L)  18.0 - 48.0 % Final    Mono % 08/04/2024 5.4  4.0 - 15.0 % Final    Eosinophil % 08/04/2024 0.1  0.0 - 8.0 % Final    Basophil % 08/04/2024 0.1  0.0 - 1.9 % Final    Differential Method 08/04/2024 Automated   Final    Sodium 08/04/2024 137  136 - 145 mmol/L Final    Potassium 08/04/2024 4.5  3.5 - 5.1 mmol/L Final    Chloride 08/04/2024 107  95 - 110 mmol/L Final    CO2 08/04/2024 22  22 - 31 mmol/L Final    Glucose 08/04/2024 110  70 - 110 mg/dL Final    BUN 08/04/2024 23 (H)  7 - 18 mg/dL Final    Creatinine 08/04/2024 0.99  0.50 - 1.40 mg/dL Final    Calcium 08/04/2024 8.6  8.4 - 10.2 mg/dL Final    Anion Gap 08/04/2024 8  5 - 12 mmol/L Final    eGFR 08/04/2024 57 (A)  >60 mL/min/1.73 m^2 Final    WBC 08/05/2024 9.03  3.90 - 12.70 K/uL Final    RBC 08/05/2024 3.82 (L)  4.00 - 5.40 M/uL Final    Hemoglobin 08/05/2024 10.4 (L)  12.0 - 16.0 g/dL Final    Hematocrit 08/05/2024 33.3 (L)  37.0 - 48.5 % Final    MCV 08/05/2024 87  82 - 98 fL Final    MCH 08/05/2024 27.2  27.0 - 31.0 pg Final    MCHC 08/05/2024 31.2 (L)  32.0 - 36.0 g/dL Final    RDW 08/05/2024 14.2  11.5 - 14.5 % Final     Platelets 08/05/2024 188  150 - 450 K/uL Final    MPV 08/05/2024 10.9  9.2 - 12.9 fL Final    Immature Granulocytes 08/05/2024 0.3  0.0 - 0.5 % Final    Gran # (ANC) 08/05/2024 5.5  1.8 - 7.7 K/uL Final    Immature Grans (Abs) 08/05/2024 0.03  0.00 - 0.04 K/uL Final    Lymph # 08/05/2024 2.6  1.0 - 4.8 K/uL Final    Mono # 08/05/2024 0.7  0.3 - 1.0 K/uL Final    Eos # 08/05/2024 0.2  0.0 - 0.5 K/uL Final    Baso # 08/05/2024 0.06  0.00 - 0.20 K/uL Final    nRBC 08/05/2024 0  0 /100 WBC Final    Gran % 08/05/2024 60.7  38.0 - 73.0 % Final    Lymph % 08/05/2024 28.9  18.0 - 48.0 % Final    Mono % 08/05/2024 7.4  4.0 - 15.0 % Final    Eosinophil % 08/05/2024 2.0  0.0 - 8.0 % Final    Basophil % 08/05/2024 0.7  0.0 - 1.9 % Final    Differential Method 08/05/2024 Automated   Final    Sodium 08/05/2024 139  136 - 145 mmol/L Final    Potassium 08/05/2024 3.9  3.5 - 5.1 mmol/L Final    Chloride 08/05/2024 107  95 - 110 mmol/L Final    CO2 08/05/2024 26  22 - 31 mmol/L Final    Glucose 08/05/2024 93  70 - 110 mg/dL Final    BUN 08/05/2024 27 (H)  7 - 18 mg/dL Final    Creatinine 08/05/2024 1.24  0.50 - 1.40 mg/dL Final    Calcium 08/05/2024 8.7  8.4 - 10.2 mg/dL Final    Anion Gap 08/05/2024 6  5 - 12 mmol/L Final    eGFR 08/05/2024 44 (A)  >60 mL/min/1.73 m^2 Final    Sodium 08/06/2024 138  136 - 145 mmol/L Final    Potassium 08/06/2024 4.2  3.5 - 5.1 mmol/L Final    Chloride 08/06/2024 107  95 - 110 mmol/L Final    CO2 08/06/2024 25  22 - 31 mmol/L Final    Glucose 08/06/2024 95  70 - 110 mg/dL Final    BUN 08/06/2024 25 (H)  7 - 18 mg/dL Final    Creatinine 08/06/2024 1.01  0.50 - 1.40 mg/dL Final    Calcium 08/06/2024 8.8  8.4 - 10.2 mg/dL Final    Anion Gap 08/06/2024 6  5 - 12 mmol/L Final    eGFR 08/06/2024 56 (A)  >60 mL/min/1.73 m^2 Final       Past Medical History:   Diagnosis Date    AAA (abdominal aortic aneurysm)     Arthritis     Rheumatoid arthritis(714.0)     Stroke      Past Surgical History:   Procedure  Laterality Date    CORONARY ARTERY BYPASS GRAFT      INTRAMEDULLARY RODDING OF TROCHANTER OF FEMUR Right 8/3/2024    Procedure: INSERTION, INTRAMEDULLARY IVONE, FEMUR, TROCHANTER;  Surgeon: Rod England MD;  Location: [facility] OR;  Service: Orthopedics;  Laterality: Right;    TOTAL KNEE ARTHROPLASTY       Family History   Problem Relation Name Age of Onset    Cancer Father      Diabetes Sister      Heart attack Paternal Uncle         Marital Status:   Alcohol History:  reports no history of alcohol use.  Tobacco History:  reports that she quit smoking about 49 years ago. Her smoking use included cigarettes. She started smoking about 61 years ago. She has a 18 pack-year smoking history. She has never used smokeless tobacco.  Drug History:  reports no history of drug use.    Review of patient's allergies indicates:   Allergen Reactions    Anesthesia s/i-40 (propofol) [propofol]        Current Outpatient Medications:     atorvastatin (LIPITOR) 80 MG tablet, TAKE ONE TABLET BY MOUTH EVERY DAY, Disp: 90 tablet, Rfl: 1    diclofenac sodium (VOLTAREN) 1 % Gel, Apply 2 g topically 4 (four) times daily. (Patient taking differently: Apply 2 g topically 4 (four) times daily as needed.), Disp: 200 g, Rfl: 5    donepeziL (ARICEPT) 10 MG tablet, TAKE ONE TABLET BY MOUTH EVERY DAY, Disp: 90 tablet, Rfl: 2    fenofibrate 160 MG Tab, Take 160 mg by mouth once daily., Disp: , Rfl:     lamoTRIgine (LAMICTAL) 100 MG tablet, Take 100 mg by mouth 2 (two) times daily. , Disp: , Rfl:     levETIRAcetam (KEPPRA) 250 MG Tab, Take 250 mg by mouth 2 (two) times daily., Disp: , Rfl:     levothyroxine (SYNTHROID) 75 MCG tablet, TAKE ONE TABLET BY MOUTH ONCE DAILY, Disp: 90 tablet, Rfl: 1    metoprolol succinate (TOPROL-XL) 25 MG 24 hr tablet, TAKE ONE TABLET BY MOUTH ONCE DAILY, Disp: 90 tablet, Rfl: 1    multivitamin capsule, Take 1 capsule by mouth once daily., Disp: , Rfl:     pantoprazole (PROTONIX) 40 MG tablet, Take 1 tablet (40 mg  "total) by mouth once daily., Disp: 90 tablet, Rfl: 1    lactulose (CHRONULAC) 20 gram/30 mL Soln, Take 30 mLs (20 g total) by mouth once daily., Disp: , Rfl:     Review of Systems     Objective:      Vitals:    12/04/24 1537   BP: 106/70   Pulse: 72   Resp: 18   Temp: 98.4 °F (36.9 °C)   TempSrc: Oral   Height: 5' 6" (1.676 m)     Physical Exam    Assessment:       1. Chronic constipation    2. Age-related osteoporosis with current pathological fracture with routine healing, subsequent encounter    3. Aortic atherosclerosis    4. Stage 3a chronic kidney disease         Plan:       Chronic constipation  -     lactulose (CHRONULAC) 20 gram/30 mL Soln; Take 30 mLs (20 g total) by mouth once daily.    Age-related osteoporosis with current pathological fracture with routine healing, subsequent encounter    Aortic atherosclerosis    Stage 3a chronic kidney disease    Other orders  -     denosumab (PROLIA) injection 60 mg      Follow up in about 3 months (around 3/4/2025) for video visit.              History of Present Illness    CHIEF COMPLAINT:  Ms. Pacheco presents for a follow-up visit after a recent hospitalization for acute renal failure and to discuss ongoing care for her dementia and mobility issues.    HPI:  Ms. Pacheco, an elderly female, was recently hospitalized for acute renal failure due to dehydration and electrolyte imbalance, specifically hypernatremic volume depletion, within the past 2 weeks. She had previously fallen and fractured her femur in early August. Following the femur fracture, possibly related to anesthesia, the patient developed total bladder control loss and urinary incontinence. Ms. Pacheco's son reports using a PureWick device to manage this, but the patient frequently removes it.    Ms. Pacheco also has bowel incontinence, potentially related to stage 3 dementia. She is often unaware of bowel movements, even when seated on the toilet. This issue developed after the femur fracture and " "subsequent anesthesia.    Ms. Pacheco's mobility has significantly decreased. After a hospital stay for bowel blockage at [facility], she had difficulty lifting her feet to take complete steps. Her condition deteriorated to the point where she lost function in her hands and legs and was unable to feed herself.    Ms. Pacheco had an incident of respiratory distress, gasping for air twice, leading to another hospitalization at [facility]. The son suspects this may have been an aspiration episode.    Ms. Pacheco had a period without her donepezil medication for about a week, referred to as the "donepezil episode." This led to a significant decline in her condition, initially mistaken for Lewy body dementia progression but later determined to be related to renal issues.    Currently, the patient's appetite is improving. She is consuming full breakfast sandwiches and dinner portions, an improvement from previous half portions. She is constipated, not having had a bowel movement for a few days.    Ms. Pacheco is receiving home health care from [facility]. She has upcoming appointments scheduled with a neurologist for a conservatorship evaluation and potentially with an infusion center for osteoporosis treatment.    Ms. Pacheco denies any current flu-like symptoms. Ms. Pacheco's son denies that she has been diagnosed with Lewy body disease.    MEDICATIONS:  Ms. Pacheco is on Aricept (Donepezil), Lamictal and Keppra, both taken as 1 tablet twice daily. She is also taking Centrum vitamin supplement.    MEDICAL HISTORY:  Ms. Pacheco has a history of dementia, chronic renal failure, and osteoporosis. She is suspected to have Lewy body disease. 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    SURGICAL HISTORY:  Ms. Pacheco underwent femur fracture repair in early August.    TEST RESULTS:  Recent electrolyte tests revealed low electrolytes and high sodium levels, indicating hypernatremic " volume depletion.    IMAGING:  An X-ray of the femur was recently ordered by Dr. Gunn to follow up on the patient's femur fracture.      ROS:  Constitutional: -fevers  Eyes: +vision changes  Respiratory: +shortness of breath  Gastrointestinal: +constipation  Genitourinary: +urinary incontinence  Musculoskeletal: +muscle weakness          Allergies and Medications:   Review of patient's allergies indicates:   Allergen Reactions    Anesthesia s/i-40 (propofol) [propofol]      Current Outpatient Medications   Medication Sig Dispense Refill    atorvastatin (LIPITOR) 80 MG tablet TAKE ONE TABLET BY MOUTH EVERY DAY 90 tablet 1    diclofenac sodium (VOLTAREN) 1 % Gel Apply 2 g topically 4 (four) times daily. (Patient taking differently: Apply 2 g topically 4 (four) times daily as needed.) 200 g 5    donepeziL (ARICEPT) 10 MG tablet TAKE ONE TABLET BY MOUTH EVERY DAY 90 tablet 2    fenofibrate 160 MG Tab Take 160 mg by mouth once daily.      lamoTRIgine (LAMICTAL) 100 MG tablet Take 100 mg by mouth 2 (two) times daily.       levETIRAcetam (KEPPRA) 250 MG Tab Take 250 mg by mouth 2 (two) times daily.      levothyroxine (SYNTHROID) 75 MCG tablet TAKE ONE TABLET BY MOUTH ONCE DAILY 90 tablet 1    metoprolol succinate (TOPROL-XL) 25 MG 24 hr tablet TAKE ONE TABLET BY MOUTH ONCE DAILY 90 tablet 1    multivitamin capsule Take 1 capsule by mouth once daily.      pantoprazole (PROTONIX) 40 MG tablet Take 1 tablet (40 mg total) by mouth once daily. 90 tablet 1    lactulose (CHRONULAC) 20 gram/30 mL Soln Take 30 mLs (20 g total) by mouth once daily.       No current facility-administered medications for this visit.       Family History:   Family History   Problem Relation Name Age of Onset    Cancer Father      Diabetes Sister      Heart attack Paternal Uncle         Social History:   Social History     Socioeconomic History    Marital status:    Tobacco Use    Smoking status: Former     Current packs/day: 0.00     Average  packs/day: 1.5 packs/day for 12.0 years (18.0 ttl pk-yrs)     Types: Cigarettes     Start date: 1963     Quit date: 1975     Years since quittin.8    Smokeless tobacco: Never   Substance and Sexual Activity    Alcohol use: No    Drug use: No    Sexual activity: Not Currently     Social Drivers of Health     Financial Resource Strain: Patient Declined (2024)    Overall Financial Resource Strain (CARDIA)     Difficulty of Paying Living Expenses: Patient declined   Food Insecurity: Patient Declined (2024)    Hunger Vital Sign     Worried About Running Out of Food in the Last Year: Patient declined     Ran Out of Food in the Last Year: Patient declined   Transportation Needs: No Transportation Needs (2024)    TRANSPORTATION NEEDS     Transportation : No   Physical Activity: Inactive (10/28/2024)    [facility]    Exercise Vital Sign     Days of Exercise per Week: 0 days     Minutes of Exercise per Session: 0 min   Stress: Patient Declined (2024)    [employer] [place] [employer] - Occupational Stress Questionnaire     Feeling of Stress : Patient declined   Housing Stability: Patient Declined (2024)    Housing Stability Vital Sign     Unable to Pay for Housing in the Last Year: Patient declined     Homeless in the Last Year: Patient declined           Objective:     Vitals:    24 1537   BP: 106/70   Pulse: 72   Resp: 18   Temp: 98.4 °F (36.9 °C)        Physical Exam    Vitals: Weight: 123 lbs.  General: No acute distress. Well-developed. Well-nourished.  Eyes: EOMI. Sclerae anicteric.  HENT: Normocephalic. Atraumatic. Nares patent. Moist oral mucosa.  Cardiovascular: Regular rate. Regular rhythm. No murmurs. No rubs. No gallops. Normal S1, S2.  Respiratory: Normal respiratory effort. Clear to auscultation bilaterally. No rales. No rhonchi. No wheezing.  Musculoskeletal: No  obvious deformity.  Extremities: No  lower extremity edema.  Neurological: Alert & oriented x3. No slurred speech. Normal gait.  Psychiatric: Normal mood. Normal affect. Good insight. Good judgment.  Skin: Warm. Dry. No rash.            Assessment:       1. Chronic constipation    2. Age-related osteoporosis with current pathological fracture with routine healing, subsequent encounter    3. Aortic atherosclerosis    4. Stage 3a chronic kidney disease        Plan:       Assessment & Plan    IMPRESSION:  - Assessed recent hospitalization for acute renal failure with hypernatremic volume depletion  - Evaluated impact of anesthesia from femur surgery on dementia progression  - Discontinued Periactin due to sedation risk  - Recommend Prolia injection for osteoporosis management  - Determined flu vaccine appropriate, advised against COVID vaccine due to potential side effects    DEMENTIA:  - Discussed stages of dementia progression, including loss of bowel and bladder control in stage 3.  - Explained potential impact of anesthesia on dementia symptoms.  - Restarted Aricept.    OSTEOPOROSIS:  - Provided information on Prolia injection as alternative to oral osteoporosis medications.  - Discontinued Fosamax.  - Ordered Prolia (Denosumab) injection for osteoporosis, to be administered every 6 months.    CONSTIPATION:  - Started lactulose daily for constipation management.    SEIZURE DISORDER:  - Continued Lamictal and Keppra, 1 of each twice daily.    GENERAL HEALTH:  - Continued Centrum vitamin supplement.  - Administered flu vaccine during visit.    MOBILITY ISSUES:  - Consider virtual visit option for future appointments due to patient's mobility issues.    FOLLOW-UP:  - Follow up in 3 months.  - Follow up for at least 1 face-to-face visit annually.        Alondra was seen today for hfu.    Diagnoses and all orders for this visit:    Chronic constipation  -     lactulose (CHRONULAC) 20 gram/30 mL Soln; Take 30 mLs (20 g total) by mouth once  daily.    Age-related osteoporosis with current pathological fracture with routine healing, subsequent encounter    Aortic atherosclerosis    Stage 3a chronic kidney disease    Other orders  -     denosumab (PROLIA) injection 60 mg         Follow up in about 3 months (around 3/4/2025) for video visit.  This note was generated with the assistance of ambient listening technology. Verbal consent was obtained by the patient and accompanying visitor(s) for the recording of patient appointment to facilitate this note. I attest to having reviewed and edited the generated note for accuracy, though some syntax or spelling errors may persist. Please contact the author of this note for any clarification.

## 2024-12-09 ENCOUNTER — OUTPATIENT CASE MANAGEMENT (OUTPATIENT)
Dept: ADMINISTRATIVE | Facility: OTHER | Age: 79
End: 2024-12-09
Payer: MEDICARE

## 2024-12-09 NOTE — PROGRESS NOTES
"Son voices Mrs. Cantu has "Lyla's Pity Parties"; these episodes are usually due to constipation issues; resolves once constipation has resolved.    "

## 2024-12-09 NOTE — LETTER
Alondra Pacheco  [address] [place] [address]    Dear Alondra Pacheco,     Welcome to [facility] Outpatient [facility] Management Program. We are here to assist patients with multiple long-term (chronic) conditions who often need more personalized healthcare.    It was a pleasure talking with you today. My name is Albina Angel RN. I look forward to working with you as your Care Manager. I will be contacting you by telephone routinely to help coordinate care and resolve issues.    My goal is to help you function at the healthiest and highest level possible. You can contact me directly at [phone].    As an [facility] patient with [employer], some of the services we provide, at no cost to you, include:     Development of an individualized care plan with a Registered Nurse   Connection with a   Assistance from a Community Health Worker  Connection with available resources and services    Coordinate communication among your care team members   Provide coaching and education  Help you understand your doctor's treatment plan  Help you obtain information about your insurance coverage.    All services provided by [facility] Outpatient Care Managers and other care team members are coordinated with and communicated to your primary care team.      As part of your enrollment, you will be receiving education materials and more information about these services in your [facility] account, by phone, or through the mail. If you do not wish to participate or receive information, you can Opt Out by contacting our office at [phone].      Sincerely,        Albina Angel RN  [facility]   Outpatient Care Management

## 2024-12-09 NOTE — LETTER
Alondra Pacheco  [address] [place] [address]    Dear Alondra Pacheco,     Welcome to [facility]. We are here to assist patients with multiple long-term (chronic) conditions who often need more personalized healthcare.    It was a pleasure talking with you today. My name is Albina Angel RN. I look forward to working with you as your Care Manager. I will be contacting you by telephone routinely to help coordinate care and resolve issues.    My goal is to help you function at the healthiest and highest level possible. You can contact me directly at [phone].    As an [facility] patient with [employer], some of the services we provide, at no cost to you, include:     Development of an individualized care plan with a Registered Nurse   Connection with a   Assistance from a Community Health Worker  Connection with available resources and services    Coordinate communication among your care team members   Provide coaching and education  Help you understand your doctor's treatment plan  Help you obtain information about your insurance coverage.    All services provided by [facility] Outpatient Care Managers and other care team members are coordinated with and communicated to your primary care team.      As part of your enrollment, you will be receiving education materials and more information about these services in your [facility] account, by phone, or through the mail. If you do not wish to participate or receive information, you can Opt Out by contacting our office at [phone].      Sincerely,        Albina Angel RN  [facility]   Outpatient Care Management

## 2024-12-09 NOTE — PROGRESS NOTES
Outpatient Care Management  Initial Patient Assessment    Patient: Alondra Pacheco  MRN: [id number]  Date of Service: 12/09/2024  Completed by: Albina Angel RN  Referral Date: 11/18/2024  Date of Eligibility: 11/19/2024  Program:   High Risk  Status: Ongoing  Effective Dates: 12/9/2024 - present  Responsible Staff: Albina Angel, RN        Reason for Visit   Patient presents with    OPCM Enrollment Call       Brief Summary:  Alondra Pacheco was referred by Dr. Calabrese for dehydration. Patient qualifies for program based on chronic comorbidities; has risk score of 59.6%.   Active problem list, medical, surgical and social history reviewed. Active comorbidities include Dementia, HTN, HLD, RA, AAA, stroke, arthritis, seizures. Areas of need identified by patient/caregiver include fear of falling and arthritic pain.   Next steps:   Follow up re: OPCM SW referral.  Follow up re: Dementia SW.  Follow up re: meal delivery.  Follow up message sent to PCP re: med rec.  Explore eval by audiology.  Explore re-eval for eyewear; Bifocals can be confusing for dementia patients to wear due to struggling to recall to look through the correct part of the lens.  PATIENT SELF MANAGEMENT PLAN:  Mr. Yu agreed to referral to [facility] SW to contact him within the next 2 weeks regarding resource needs for Mrs. Cantu.  Mr. Yu agreed to continue reminding Mrs. Cantu to use her walker at all times with ambulation or standing.    Disability Status  Is the patient alert and oriented (person, place, time, and situation)?: Disoriented to Place; Disoriented to Time  Hearing Difficulty or Deaf: yes  Visual Difficulty or Blind: yes  Visual and Hearing Needs Conclusion: Left eye issue post stroke 7 years ago; goes to eye exams; was provided Bifocals but does not wear them due to c/o headaches; Mr. Yu has tried to encourage her to wear them and discusses that this may be a temporary issue.  Hearing concern but feels it's due to  aging; Mr. Yu does have to repeat his questions to her.  Difficulty Concentrating, Remembering or Making Decisions: yes  Communication Difficulty: no  Eating/Swallowing Difficulty: no  Walking or Climbing Stairs Difficulty: yes  Walking or Climbing Stairs: ambulation difficulty, dependent; stair climbing difficulty, dependent; transferring difficulty, dependent  Dressing/Bathing Difficulty: yes  Dressing/Bathing: bathing difficulty, assistance 1 person; dressing difficulty, assistance 1 person; bathing difficulty, requires equipment  Toileting : Assistance Required  Continence : Incontinence - Bowel; Incontience - Bladder  Difficulty Managing Errands Independently: yes  Equipment Currently Used at Home: blood pressure machine; bedside commode; rollator; walker, rolling; wheelchair; grab bar; lift device; other (see comments); scale; shower chair (Has Tub Shower Swival chair; waiting on delivery of lift chair; Purwick system.)  ADL Conclusion Statement: Ambulates with use of RW at allt imes; was using Purwick although throws on the floor; needs assistance with bathing and dressing; nephew will help on occasion; feeds self.  Change in Functional Status Since Onset of Current Illness/Injury: yes        Spiritual Beliefs  Spiritual, Cultural Beliefs, Sabianism Practices, Values that Affect Care: no      Social History     Socioeconomic History    Marital status:    Tobacco Use    Smoking status: Former     Current packs/day: 0.00     Average packs/day: 1.5 packs/day for 12.0 years (18.0 ttl pk-yrs)     Types: Cigarettes     Start date: 1963     Quit date: 1975     Years since quittin.8    Smokeless tobacco: Never   Substance and Sexual Activity    Alcohol use: No    Drug use: No    Sexual activity: Not Currently     Social Drivers of Health     Financial Resource Strain: Patient Declined (2024)    Overall Financial Resource Strain (CARDIA)     Difficulty of Paying Living Expenses: Patient  declined   Food Insecurity: Patient Declined (11/16/2024)    Hunger Vital Sign     Worried About Running Out of Food in the Last Year: Patient declined     Ran Out of Food in the Last Year: Patient declined   Transportation Needs: No Transportation Needs (11/16/2024)    TRANSPORTATION NEEDS     Transportation : No   Physical Activity: Inactive (10/28/2024)    Received from [facility] and [facility]    Exercise Vital Sign     Days of Exercise per Week: 0 days     Minutes of Exercise per Session: 0 min   Stress: Patient Declined (11/16/2024)    [employer] - Occupational Stress Questionnaire     Feeling of Stress : Patient declined   Housing Stability: Patient Declined (11/16/2024)    Housing Stability Vital Sign     Unable to Pay for Housing in the Last Year: Patient declined     Homeless in the Last Year: Patient declined       Roles and Relationships  Primary Source of Support/Comfort: child(lucas)  Name of Support/Comfort Primary Source: mario Betancourt      Advance Directives (For Healthcare)  Advance Directive  (If Adv Dir status is received, view document under Adv Dir in header or Chart Review Media tab): Advance Directive currently in Epic.        Patient Reported Insurance  Verified current insurance plan:: [employer] Advantage  [employer] benefits discussed:: OTC Prescription Discounts; Well Dine; Transportation; Silver Sneakers; Mail Order Pharmacy            12/9/2024     2:02 PM 12/4/2024     3:38 PM 7/28/2023     3:58 PM 1/27/2023     3:50 PM 1/27/2023     3:49 PM 1/27/2023     3:43 PM 10/27/2022     3:26 PM   Depression Patient Health Questionnaire   Over the last two weeks how often have you been bothered by little interest or pleasure in doing things Not at all Not at all Not at all Not at all Not at all Not at all Not at all   Over the last two weeks how often have you been bothered by feeling down, depressed or hopeless Not at all Not at all Not  at all Not at all Not at all Not at all Not at all   PHQ-2 Total Score 0 0 0 0 0 0 0       Learning Assessment       12/09/2024 1434 [facility] (12/9/2024 - Present)   Created by Albina Angel RN -  (Nurse) Status: Complete                 PRIMARY LEARNER     Primary Learner Name:  Gigi Pacheco, Son DB - 12/09/2024 1434    Relationship:  Family DB - 12/09/2024 1434    Does the primary learner have any barriers to learning?:  No Barriers DB - 12/09/2024 1434    What is the preferred language of the primary learner?:  English DB - 12/09/2024 1434    Is an  required?:  No DB - 12/09/2024 1434    How does the primary learner prefer to learn new concepts?:  Listening, Reading DB - 12/09/2024 1434    How often do you need to have someone help you read instructions, pamphlets, or written material from your doctor or pharmacy?:  Never DB - 12/09/2024 1434        CO-LEARNER #1     No question answered        CO-LEARNER #2     No question answered        SPECIAL TOPICS     No question answered        ANSWERED BY:     No question answered        Comments         Edit History       Albina Angel, RN -  (Nurse)   12/09/2024 1434

## 2024-12-11 DIAGNOSIS — K59.09 CHRONIC CONSTIPATION: ICD-10-CM

## 2024-12-11 RX ORDER — LACTULOSE 10 G/15ML
20 SOLUTION ORAL DAILY
Qty: 900 ML | Refills: 0 | Status: SHIPPED | OUTPATIENT
Start: 2024-12-11

## 2024-12-12 ENCOUNTER — PATIENT OUTREACH (OUTPATIENT)
Dept: NEUROLOGY | Facility: CLINIC | Age: 79
End: 2024-12-12
Payer: MEDICARE

## 2024-12-12 NOTE — PROGRESS NOTES
In response to work queue referral, LIVIA scheduled dementia care management call with pt's son, Gigi, for 12/16/24.  SW provided name and contact information and will follow up.

## 2024-12-14 DIAGNOSIS — F02.80 DEMENTIA ASSOCIATED WITH OTHER UNDERLYING DISEASE WITHOUT BEHAVIORAL DISTURBANCE: ICD-10-CM

## 2024-12-16 ENCOUNTER — PATIENT OUTREACH (OUTPATIENT)
Dept: NEUROLOGY | Facility: CLINIC | Age: 79
End: 2024-12-16
Payer: MEDICARE

## 2024-12-16 RX ORDER — DONEPEZIL HYDROCHLORIDE 10 MG/1
10 TABLET, FILM COATED ORAL DAILY
Qty: 90 TABLET | Refills: 0 | Status: SHIPPED | OUTPATIENT
Start: 2024-12-16

## 2024-12-16 NOTE — PROGRESS NOTES
In response to work queue referral SW called Cg for scheduled care management call and LVM  SW will make second attempt 12/17/24

## 2024-12-17 ENCOUNTER — PATIENT OUTREACH (OUTPATIENT)
Dept: NEUROLOGY | Facility: CLINIC | Age: 79
End: 2024-12-17

## 2024-12-17 ENCOUNTER — OUTPATIENT CASE MANAGEMENT (OUTPATIENT)
Dept: ADMINISTRATIVE | Facility: OTHER | Age: 79
End: 2024-12-17
Payer: MEDICARE

## 2024-12-17 NOTE — PROGRESS NOTES
"Outpatient Care Management   - Patient Assessment    Patient: Alondra Pacheco  MRN:  [id number]  Date of Service:  12/17/2024  Completed by:  Eri Cowart LCSW  Referral Date: 11/18/2024    Reason for Visit   Patient presents with    Social Work Assessment       Brief Summary:  received a referral from OPCM RN for the following HR SW psychosocial needs:  "Reach out to the patient regarding: Complete SDOH Questionnaire and Insurance benefit availability/assistance/explanation  Notes: Any food resources; does not know if patient has any long term care benefits; has applied for [place] Medicaid Waiver but does not qualify." Care plan was created in collaboration with patient/caregiver input.   completed the SDOH questionnaire.       "

## 2024-12-18 ENCOUNTER — PATIENT MESSAGE (OUTPATIENT)
Dept: FAMILY MEDICINE | Facility: CLINIC | Age: 79
End: 2024-12-18
Payer: MEDICARE

## 2024-12-19 ENCOUNTER — PATIENT OUTREACH (OUTPATIENT)
Dept: NEUROLOGY | Facility: CLINIC | Age: 79
End: 2024-12-19
Payer: MEDICARE

## 2024-12-19 ENCOUNTER — TELEPHONE (OUTPATIENT)
Dept: FAMILY MEDICINE | Facility: CLINIC | Age: 79
End: 2024-12-19
Payer: MEDICARE

## 2024-12-19 NOTE — TELEPHONE ENCOUNTER
Message from Care giver    Caller: Unspecified (Today, 11:14 AM)  Danielle called last week and yesterday from [facility]. She wanted to get an U/A order on this patient. Her urine has a bad smell. Please call Danielle # [phone] GH

## 2024-12-19 NOTE — PROGRESS NOTES
In response to a work queue referral LIVIA called CG to provide dementia care management.      CG expressed the following concerns:    --He has not been able to work for 5 mos due to caring for the pt  --Pt is ineligible for Medicaid due to cash assets  --CG does not want to place pt in a facility because this would require spending down pt's assets  --Private sitters are too expensive  --Day programs are not an option due to pt's fear of falling    LIVIA provided education about the the course of dementia and discussed goals of care with CG.  He feels he has exhausted every option and is resolved to stay home and care for the pt.  He has a financial plan to obtain health insurance for himself and feels confident in his budget projections in terms of caring the pt full-time.  CG expressed gratitude for the call and agreed to reach out with future needs.  LIVIA provided direct contact information and will remain available.

## 2024-12-20 ENCOUNTER — TELEPHONE (OUTPATIENT)
Dept: FAMILY MEDICINE | Facility: CLINIC | Age: 79
End: 2024-12-20
Payer: MEDICARE

## 2024-12-20 NOTE — TELEPHONE ENCOUNTER
Message about patient.    Message from Care giver     Caller: Unspecified (Today, 11:14 AM)  Danielle called last week and yesterday from [facility]. She wanted to get an U/A order on this patient. Her urine has a bad smell. Please call Danielle # [phone] GH

## 2024-12-23 ENCOUNTER — OUTPATIENT CASE MANAGEMENT (OUTPATIENT)
Dept: ADMINISTRATIVE | Facility: OTHER | Age: 79
End: 2024-12-23
Payer: MEDICARE

## 2024-12-23 NOTE — PROGRESS NOTES
Outpatient Care Management  Plan of Care Follow Up Visit    Patient: Alondra Pacheco  MRN: [id number]  Date of Service: 12/23/2024  Completed by: Albina Angel RN  Referral Date: 11/18/2024    Reason for Visit   Patient presents with    OPCM RN Follow Up Call     Requested call back post holiday; currently finishing up lunch and then has an errand to run in [email or web address].       12/30/2024  Brief Summary: OPCM RN followed up with Gigi today for care plan review.    Next Steps:   Gigi agreed to OPCM RN follow up on Mrs. Pachceo on or around 1/7/25.  Follow up re: secure chat message sent to PCP office for UA C&S review.  Falls.  PATIENT SELF MANAGEMENT PLAN:  Mr. Yu agreed for OPCM RN to contact [facility] re: UA C&S sent at the end of last week today.

## 2024-12-26 ENCOUNTER — TELEPHONE (OUTPATIENT)
Dept: FAMILY MEDICINE | Facility: CLINIC | Age: 79
End: 2024-12-26
Payer: MEDICARE

## 2024-12-26 NOTE — TELEPHONE ENCOUNTER
----- Message from Alejandro sent at 12/26/2024  7:14 AM CST -----  Vm-1:42pm-Danielle at [facility] called 12/24/24 in reference to this pt could someone give her a call ASAP  [phone](Danielle-nurse)

## 2024-12-30 ENCOUNTER — OUTPATIENT CASE MANAGEMENT (OUTPATIENT)
Dept: ADMINISTRATIVE | Facility: OTHER | Age: 79
End: 2024-12-30
Payer: MEDICARE

## 2025-01-02 ENCOUNTER — OUTPATIENT CASE MANAGEMENT (OUTPATIENT)
Dept: ADMINISTRATIVE | Facility: OTHER | Age: 80
End: 2025-01-02
Payer: MEDICARE

## 2025-01-02 ENCOUNTER — TELEPHONE (OUTPATIENT)
Dept: FAMILY MEDICINE | Facility: CLINIC | Age: 80
End: 2025-01-02
Payer: MEDICARE

## 2025-01-02 DIAGNOSIS — N30.01 ACUTE CYSTITIS WITH HEMATURIA: Primary | ICD-10-CM

## 2025-01-02 RX ORDER — SULFAMETHOXAZOLE AND TRIMETHOPRIM 800; 160 MG/1; MG/1
1 TABLET ORAL 2 TIMES DAILY
Qty: 14 TABLET | Refills: 0 | Status: SHIPPED | OUTPATIENT
Start: 2025-01-02 | End: 2025-01-09

## 2025-01-02 NOTE — PROGRESS NOTES
Outpatient Care Management  Plan of Care Follow Up Visit    Patient: Alondra Pacheco  MRN: [id number]  Date of Service: 01/02/2025  Completed by: Albina Angel RN  Referral Date: 11/18/2024    Reason for Visit   Patient presents with    OPCM RN Follow Up Call       Brief Summary: Continue OPCM RN follow up call as scheduled.

## 2025-01-02 NOTE — PROGRESS NOTES
Urinalysis done via home health reveals  white cells 5-10 RBCs and many bacteria we will need to initiate Bactrim therapy and recheck the urine in clinic in 1 week.

## 2025-01-09 ENCOUNTER — TELEPHONE (OUTPATIENT)
Dept: FAMILY MEDICINE | Facility: CLINIC | Age: 80
End: 2025-01-09
Payer: MEDICARE

## 2025-01-10 ENCOUNTER — DOCUMENT SCAN (OUTPATIENT)
Dept: HOME HEALTH SERVICES | Facility: HOSPITAL | Age: 80
End: 2025-01-10
Payer: MEDICARE

## 2025-01-11 ENCOUNTER — DOCUMENT SCAN (OUTPATIENT)
Dept: HOME HEALTH SERVICES | Facility: HOSPITAL | Age: 80
End: 2025-01-11
Payer: MEDICARE

## 2025-01-15 ENCOUNTER — EXTERNAL HOME HEALTH (OUTPATIENT)
Dept: HOME HEALTH SERVICES | Facility: HOSPITAL | Age: 80
End: 2025-01-15
Payer: MEDICARE

## 2025-01-21 ENCOUNTER — DOCUMENT SCAN (OUTPATIENT)
Dept: HOME HEALTH SERVICES | Facility: HOSPITAL | Age: 80
End: 2025-01-21
Payer: MEDICARE

## 2025-01-21 ENCOUNTER — OUTPATIENT CASE MANAGEMENT (OUTPATIENT)
Dept: ADMINISTRATIVE | Facility: OTHER | Age: 80
End: 2025-01-21
Payer: MEDICARE

## 2025-01-21 NOTE — PROGRESS NOTES
Outpatient Care Management  Plan of Care Follow Up Visit    Patient: Alondra Pacheco  MRN: [id number]  Date of Service: 01/21/2025  Completed by: Albina Angel RN  Referral Date: 11/18/2024    Reason for Visit   Patient presents with    OPCM RN Follow Up Call       Brief Summary: OPCM RN followed up with Gigi today for care plan review Mrs. Cantu.    Next Steps:   Gigi agreed to OPCM RN follow up on Mrs. Cantu on or around 2/4/25.  Follow up re: plans for transitioning to hospice.  PATIENT SELF MANAGEMENT PLAN:  Mr. Yu agreed for OPCM RN to reach out to [facility] regarding hospice transition/care today.

## 2025-01-22 ENCOUNTER — OUTPATIENT CASE MANAGEMENT (OUTPATIENT)
Dept: ADMINISTRATIVE | Facility: OTHER | Age: 80
End: 2025-01-22
Payer: MEDICARE

## 2025-01-24 ENCOUNTER — TELEPHONE (OUTPATIENT)
Dept: FAMILY MEDICINE | Facility: CLINIC | Age: 80
End: 2025-01-24
Payer: MEDICARE

## 2025-01-24 DIAGNOSIS — F02.80 DEMENTIA ASSOCIATED WITH OTHER UNDERLYING DISEASE WITHOUT BEHAVIORAL DISTURBANCE: Primary | ICD-10-CM

## 2025-01-27 ENCOUNTER — TELEPHONE (OUTPATIENT)
Dept: FAMILY MEDICINE | Facility: CLINIC | Age: 80
End: 2025-01-27
Payer: MEDICARE

## 2025-01-30 DIAGNOSIS — Z00.00 ENCOUNTER FOR MEDICARE ANNUAL WELLNESS EXAM: ICD-10-CM

## 2025-02-03 ENCOUNTER — DOCUMENT SCAN (OUTPATIENT)
Dept: HOME HEALTH SERVICES | Facility: HOSPITAL | Age: 80
End: 2025-02-03
Payer: MEDICARE

## 2025-02-11 ENCOUNTER — OUTPATIENT CASE MANAGEMENT (OUTPATIENT)
Dept: ADMINISTRATIVE | Facility: OTHER | Age: 80
End: 2025-02-11
Payer: MEDICARE

## 2025-02-11 NOTE — LETTER
Alondra Pacheco  [address] [place] [address]      Dear Alondra Pacheco,     I am your nurse with [facility]s Out[facility] Department. I was unsuccessful in reaching you today. At your earliest convenience, I would like to discuss your healthcare progress.      Please contact me at [phone] from 8:00AM to 4:30 PM on Monday thru Friday.     As you know, [facility] is a program offered to you through [facility] where a nurse is available 24/7 to answer questions or provide medical advice, their number is [phone].    Thanks,      Albina Angel, RN  Outpatient Care Management

## 2025-02-11 NOTE — PROGRESS NOTES
2/11/2025  1st attempt to complete Follow-Up  for Outpatient Care Management, left message.  Will send via portal -  unable to assess letter.

## 2025-02-17 ENCOUNTER — OUTPATIENT CASE MANAGEMENT (OUTPATIENT)
Dept: ADMINISTRATIVE | Facility: OTHER | Age: 80
End: 2025-02-17
Payer: MEDICARE

## 2025-02-17 NOTE — PROGRESS NOTES
Outpatient Care Management  Plan of Care Follow Up Visit    Patient: Alondra Pacheco  MRN: [id number]  Date of Service: 02/17/2025  Completed by: Albina Angel RN  Referral Date: 11/18/2024    Reason for Visit   Patient presents with    [facility] RN Follow Up Call    Case Closure     Transitioned to hospice care.     2/17/25 - Spoke with Gigi, patient's son, and was informed transition to hospice care was completed; OPCM SW aware; Case closure.

## 2025-03-06 ENCOUNTER — TELEPHONE (OUTPATIENT)
Dept: FAMILY MEDICINE | Facility: CLINIC | Age: 80
End: 2025-03-06
Payer: MEDICARE

## 2025-03-06 NOTE — TELEPHONE ENCOUNTER
----- Message from Laura sent at 3/6/2025  1:54 PM CST -----  Regarding: Patient on hospice care  Called to confirm a video visit for this patient that was scheduled for 3/7/25 and her son wanted to inform Dr. Park that she is now on hospice care, therefore the appointment was cancelled. Thank You

## 2025-03-06 NOTE — TELEPHONE ENCOUNTER
Patient message    Laura Hanley [facility] Staff  Called to confirm a video visit for this patient that was scheduled for 3/7/25 and her son wanted to inform Dr. Park that she is now on hospice care, therefore the appointment was cancelled.    Thank You

## 2025-09-04 DIAGNOSIS — Z78.0 MENOPAUSE: ICD-10-CM
